# Patient Record
Sex: FEMALE | Race: WHITE
[De-identification: names, ages, dates, MRNs, and addresses within clinical notes are randomized per-mention and may not be internally consistent; named-entity substitution may affect disease eponyms.]

---

## 2017-12-05 ENCOUNTER — HOSPITAL ENCOUNTER (OUTPATIENT)
Dept: HOSPITAL 75 - RAD | Age: 82
End: 2017-12-05
Attending: NURSE PRACTITIONER
Payer: MEDICARE

## 2017-12-05 DIAGNOSIS — R10.11: Primary | ICD-10-CM

## 2017-12-05 PROCEDURE — 76705 ECHO EXAM OF ABDOMEN: CPT

## 2017-12-05 NOTE — DIAGNOSTIC IMAGING REPORT
PROCEDURE: 

US Gallbladder.



TECHNIQUE: 

Multiple Real-time grayscale images were obtained over the right

upper quadrant in various projections.



INDICATION:  

Right upper quadrant pain.



FINDINGS:

The visualized portions of the pancreas appear unremarkable. The

liver demonstrates a septated cyst measuring 2.3 x 2.4 x 1.7 cm

in the left lobe without solid or suspicious component. The

gallbladder demonstrates no stones or wall thickening. No

pericholecystic fluid. The sonographic Vigil sign is negative.

Hepatopetal flow in the portal vein is demonstrated. The CBD is 4

mm in caliber.



The right kidney is 8.9 cm in length with no hydronephrosis.

Slight prominence of the renal pelvis without dilatation of the

renal calyces is seen.



No significant fluid collection in the upper right abdomen.



IMPRESSION: 

No gallstones or evidence of cholecystitis.



Dictated by: 



  Dictated on workstation # [id number]

## 2018-11-19 ENCOUNTER — HOSPITAL ENCOUNTER (OUTPATIENT)
Dept: HOSPITAL 75 - RAD | Age: 83
End: 2018-11-19
Attending: INTERNAL MEDICINE
Payer: MEDICARE

## 2018-11-19 DIAGNOSIS — M47.816: Primary | ICD-10-CM

## 2018-11-19 DIAGNOSIS — G57.92: ICD-10-CM

## 2018-11-19 DIAGNOSIS — D18.09: ICD-10-CM

## 2018-11-19 DIAGNOSIS — Z86.69: ICD-10-CM

## 2018-11-19 PROCEDURE — 72148 MRI LUMBAR SPINE W/O DYE: CPT

## 2018-11-19 NOTE — DIAGNOSTIC IMAGING REPORT
PROCEDURE: MRI lumbar spine.



TECHNIQUE: Multiplanar, multisequence MRI of the lumbar spine was

performed without contrast.



INDICATION: Left leg pain.



COMPARISON: There are no prior studies available for comparison.



FINDINGS: The T2 sagittal images reveal that there is desiccation

of the discs at every level and there is narrowing of the disc

spaces at L3-L4 and L4-L5.



At the L3-L4 level, there is a slight disc bulge centrally. The

disc flattens the ventral aspect of the thecal sac and narrows

the AP diameter to 1.6 mm. There does not appear to be any

significant neuroforaminal narrowing at this level.



At the L4-L5 level, there is a disc bulge centrally which

flattens the ventral aspect of the thecal sac and narrows the AP

diameter to approximately 14.3 mm. There is mild narrowing of the

neuroforamen bilaterally at this level.



There is also mild narrowing of the neuroforamen bilaterally at

L5-S1 and there is slight anterior translation of L5 with respect

to S1. The AP diameter of the thecal sac at this level is 10.5

mm.



There is no evidence for spinal stenosis or any significant

neuroforaminal narrowing at L1-L2 or L2-L3.



There is no abnormal signal arising from the cord or other

vertebral bodies to indicate an acute abnormality. There do

appear to be multiple vertebral body hemangiomas involving the

T9, T10, T11, T12, and L3, L4, and S2.



There is no abnormal signal arising from the cord. There is an

8.6 x 13.5 mm perineural cyst on the left at T11. This is most

likely benign. There is a similar appearing but smaller 6.5 x 7.2

mm cyst on the right at this level. There also appear to be a few

benign-appearing sacral cysts.



There is no sign of a paraspinal mass.



IMPRESSION:

1. 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

primarily at L3-L4 and L4-L5. There is no evidence for spinal

stenosis or nerve root encroachment at these two levels or at any

other level of the lumbar spine.

2. There is no acute bony abnormality identified and there is no

sign of a cord lesion.

3. There are benign-appearing perineural cysts bilaterally at

T11. There also appear to be a few benign sacral cysts.

4. 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



Dictated by: 



  Dictated on workstation # [id number]

## 2020-01-24 ENCOUNTER — HOSPITAL ENCOUNTER (OUTPATIENT)
Dept: HOSPITAL 75 - RAD | Age: 85
End: 2020-01-24
Attending: INTERNAL MEDICINE
Payer: MEDICARE

## 2020-01-24 DIAGNOSIS — I65.23: Primary | ICD-10-CM

## 2020-01-24 PROCEDURE — 93880 EXTRACRANIAL BILAT STUDY: CPT

## 2020-01-24 NOTE — DIAGNOSTIC IMAGING REPORT
PROCEDURE: US carotid duplex, bilateral.



TECHNIQUE: Multiple real-time grayscale images were obtained over

the carotid arteries in various projections, bilaterally.

Additional spectral analysis and color Doppler duplex images were

also obtained.



INDICATION: Carotid stenosis.



FINDINGS: The previous carotid Doppler exam performed on

03/24/2016 noted atherosclerotic disease involving both carotid

systems but failed to show any sign of a hemodynamically

significant stenosis.



On this exam, there is mild hard and soft plaque formation in

both carotid systems. The flow velocities still fail to show any

sign of a hemodynamically significant stenosis of the common or

internal carotid arteries. Both vertebral arteries were

identified and there was antegrade flow bilaterally.



IMPRESSION: There is atherosclerotic disease involving both

carotid systems but there is still no evidence for a

hemodynamically significant stenosis of the common or internal

carotid arteries.



Parameters based on the consensus panel Gray-Scale and Doppler

ultrasound criteria published 

November 2003, Radiology, Volume 229. 



DOPPLER (peak systolic velocity M/S 

    

                     Right    Left



CCA               1.00     1.16



ICA Proximal   .90     .50



ICA Mid          .95     .62

 

ICA Distal       .97     .97



RATIO            .97     .98



ECA                .71     1.46



VERT              .59     .60



Dictated by: 



  Dictated on workstation # [id number]

## 2020-03-11 ENCOUNTER — HOSPITAL ENCOUNTER (OUTPATIENT)
Dept: HOSPITAL 75 - ER | Age: 85
Setting detail: OBSERVATION
LOS: 1 days | Discharge: HOME | End: 2020-03-12
Attending: INTERNAL MEDICINE | Admitting: FAMILY MEDICINE
Payer: MEDICARE

## 2020-03-11 VITALS — SYSTOLIC BLOOD PRESSURE: 142 MMHG | DIASTOLIC BLOOD PRESSURE: 61 MMHG

## 2020-03-11 VITALS — SYSTOLIC BLOOD PRESSURE: 133 MMHG | DIASTOLIC BLOOD PRESSURE: 95 MMHG

## 2020-03-11 VITALS — SYSTOLIC BLOOD PRESSURE: 160 MMHG | DIASTOLIC BLOOD PRESSURE: 59 MMHG

## 2020-03-11 VITALS — DIASTOLIC BLOOD PRESSURE: 78 MMHG | SYSTOLIC BLOOD PRESSURE: 154 MMHG

## 2020-03-11 VITALS — DIASTOLIC BLOOD PRESSURE: 69 MMHG | SYSTOLIC BLOOD PRESSURE: 133 MMHG

## 2020-03-11 VITALS — WEIGHT: 145.28 LBS | BODY MASS INDEX: 25.74 KG/M2 | HEIGHT: 62.99 IN

## 2020-03-11 VITALS — SYSTOLIC BLOOD PRESSURE: 136 MMHG | DIASTOLIC BLOOD PRESSURE: 81 MMHG

## 2020-03-11 VITALS — SYSTOLIC BLOOD PRESSURE: 123 MMHG | DIASTOLIC BLOOD PRESSURE: 40 MMHG

## 2020-03-11 VITALS — SYSTOLIC BLOOD PRESSURE: 154 MMHG | DIASTOLIC BLOOD PRESSURE: 70 MMHG

## 2020-03-11 DIAGNOSIS — Z90.710: ICD-10-CM

## 2020-03-11 DIAGNOSIS — Z90.89: ICD-10-CM

## 2020-03-11 DIAGNOSIS — Z91.012: ICD-10-CM

## 2020-03-11 DIAGNOSIS — Z88.5: ICD-10-CM

## 2020-03-11 DIAGNOSIS — Z91.040: ICD-10-CM

## 2020-03-11 DIAGNOSIS — Z91.011: ICD-10-CM

## 2020-03-11 DIAGNOSIS — Z82.61: ICD-10-CM

## 2020-03-11 DIAGNOSIS — I44.7: Primary | ICD-10-CM

## 2020-03-11 DIAGNOSIS — I11.9: ICD-10-CM

## 2020-03-11 DIAGNOSIS — Z79.82: ICD-10-CM

## 2020-03-11 DIAGNOSIS — Z88.8: ICD-10-CM

## 2020-03-11 DIAGNOSIS — Z79.899: ICD-10-CM

## 2020-03-11 DIAGNOSIS — E03.9: ICD-10-CM

## 2020-03-11 DIAGNOSIS — K59.09: ICD-10-CM

## 2020-03-11 DIAGNOSIS — Z82.62: ICD-10-CM

## 2020-03-11 DIAGNOSIS — Z91.041: ICD-10-CM

## 2020-03-11 LAB
ALBUMIN SERPL-MCNC: 4.1 GM/DL (ref 3.2–4.5)
ALP SERPL-CCNC: 102 U/L (ref 40–136)
ALT SERPL-CCNC: 21 U/L (ref 0–55)
APTT BLD: 27 SEC (ref 24–35)
BASOPHILS # BLD AUTO: 0 10^3/UL (ref 0–0.1)
BASOPHILS NFR BLD AUTO: 1 % (ref 0–10)
BILIRUB SERPL-MCNC: 0.5 MG/DL (ref 0.1–1)
BUN/CREAT SERPL: 19
CALCIUM SERPL-MCNC: 9.3 MG/DL (ref 8.5–10.1)
CHLORIDE SERPL-SCNC: 103 MMOL/L (ref 98–107)
CK MB SERPL-MCNC: 1.1 NG/ML (ref ?–6.6)
CK SERPL-CCNC: 43 U/L (ref 29–168)
CO2 SERPL-SCNC: 23 MMOL/L (ref 21–32)
CREAT SERPL-MCNC: 0.74 MG/DL (ref 0.6–1.3)
EOSINOPHIL # BLD AUTO: 0.2 10^3/UL (ref 0–0.3)
EOSINOPHIL NFR BLD AUTO: 3 % (ref 0–10)
ERYTHROCYTE [DISTWIDTH] IN BLOOD BY AUTOMATED COUNT: 13.8 % (ref 10–14.5)
GFR SERPLBLD BASED ON 1.73 SQ M-ARVRAT: > 60 ML/MIN
GLUCOSE SERPL-MCNC: 93 MG/DL (ref 70–105)
HCT VFR BLD CALC: 42 % (ref 35–52)
HGB BLD-MCNC: 13.9 G/DL (ref 11.5–16)
INR PPP: 0.9 (ref 0.8–1.4)
LYMPHOCYTES # BLD AUTO: 1.8 X 10^3 (ref 1–4)
LYMPHOCYTES NFR BLD AUTO: 30 % (ref 12–44)
MAGNESIUM SERPL-MCNC: 2.2 MG/DL (ref 1.6–2.4)
MANUAL DIFFERENTIAL PERFORMED BLD QL: NO
MCH RBC QN AUTO: 31 PG (ref 25–34)
MCHC RBC AUTO-ENTMCNC: 33 G/DL (ref 32–36)
MCV RBC AUTO: 92 FL (ref 80–99)
MONOCYTES # BLD AUTO: 0.7 X 10^3 (ref 0–1)
MONOCYTES NFR BLD AUTO: 13 % (ref 0–12)
NEUTROPHILS # BLD AUTO: 3.2 X 10^3 (ref 1.8–7.8)
NEUTROPHILS NFR BLD AUTO: 55 % (ref 42–75)
PLATELET # BLD: 210 10^3/UL (ref 130–400)
PMV BLD AUTO: 10.9 FL (ref 7.4–10.4)
POTASSIUM SERPL-SCNC: 4.5 MMOL/L (ref 3.6–5)
PROT SERPL-MCNC: 7.4 GM/DL (ref 6.4–8.2)
PROTHROMBIN TIME: 12.4 SEC (ref 12.2–14.7)
SODIUM SERPL-SCNC: 139 MMOL/L (ref 135–145)
TSH SERPL DL<=0.05 MIU/L-ACNC: 1.78 UIU/ML (ref 0.35–4.94)
WBC # BLD AUTO: 5.9 10^3/UL (ref 4.3–11)

## 2020-03-11 PROCEDURE — 93005 ELECTROCARDIOGRAM TRACING: CPT

## 2020-03-11 PROCEDURE — 84484 ASSAY OF TROPONIN QUANT: CPT

## 2020-03-11 PROCEDURE — 82550 ASSAY OF CK (CPK): CPT

## 2020-03-11 PROCEDURE — 83735 ASSAY OF MAGNESIUM: CPT

## 2020-03-11 PROCEDURE — 83874 ASSAY OF MYOGLOBIN: CPT

## 2020-03-11 PROCEDURE — 85730 THROMBOPLASTIN TIME PARTIAL: CPT

## 2020-03-11 PROCEDURE — 36415 COLL VENOUS BLD VENIPUNCTURE: CPT

## 2020-03-11 PROCEDURE — 84443 ASSAY THYROID STIM HORMONE: CPT

## 2020-03-11 PROCEDURE — 80053 COMPREHEN METABOLIC PANEL: CPT

## 2020-03-11 PROCEDURE — 85610 PROTHROMBIN TIME: CPT

## 2020-03-11 PROCEDURE — 71045 X-RAY EXAM CHEST 1 VIEW: CPT

## 2020-03-11 PROCEDURE — 83880 ASSAY OF NATRIURETIC PEPTIDE: CPT

## 2020-03-11 PROCEDURE — 85025 COMPLETE CBC W/AUTO DIFF WBC: CPT

## 2020-03-11 PROCEDURE — 82553 CREATINE MB FRACTION: CPT

## 2020-03-11 PROCEDURE — 93041 RHYTHM ECG TRACING: CPT

## 2020-03-11 RX ADMIN — Medication SCH ML: at 22:26

## 2020-03-11 NOTE — ED CARDIAC GENERAL
History of Present Illness


General


Chief Complaint:  Cardiac/General Problems


Stated Complaint:  ABNORMAL EKG


Nursing Triage Note:  


Pt to room #6 via ED w/c by family with c/o EKG changes. Pt reports pta, she was




seen at PCP where an EKG was obtained, showing changes not seen on previous EKG.




Pt reports she was sent to this ED for further evaluation by Dr. Hutchison. Pt 


reports weakness, palpitation, et SOA. Pt denies CP, fever, or chills.


Source:  patient





History of Present Illness


Date Seen by Provider:  Mar 11, 2020


Time Seen by Provider:  17:00


Initial Comments


PT ARRIVES VIA POV WITH  AND DAUGHTER--SENT HERE FROM DR. HUTCHISON'S 

OFFICE


STATES SHE WAS TOLD HER "LEFT BUNDLE BRANCH BLOCK IS GETTING WORSE" 


PT STATES SHE WAS DX WITH LBBB IN THE LAST YEAR, BUT HAS NOT SEEN A CARDIOLOGIST

IN SEVERAL YEARS


HAD CARDIAC CATH BY DR. LUQUE AT [facility] YEARS AGO--WAS TOLD ARTERIES 

WERE "CLEAR" BUT SHE HAS PROBLEMS WITH HER VALVES--"MITRAL VALVE PROLAPSE" PER 

PT AND FAMILY. 





PT DENIES CHEST PAIN 


STATES SHE HAS BEEN HAVING SHORTNESS OF BREATH FOR THE LAST FEW MONTHS


STATES SHE HAS BEEN HAVING LEG SWELLING FOR ABOUT 6 MONTHS, ALONG WITH SWELLING 

OF HER ABDOMEN


STATES SHE HAS ONGOING INTERMITTENT PALPITATIONS--STATES SHE HAD AN EPISODE LAST

NIGHT THAT WOKE HER UP--HEART WAS BEATING FAST AND VERY IRREGULAR AND THEN IT 

WOULD STOP BEATING FOR A SECOND, THEN START BEATING AGAIN. WAS NAUSEATED WITH IT

AS WELL


NO SWEATS


HAS HAD A COUPLE OF EPISODES OF DIZZINESS ON STANDING AND WITH EXERTION OVER THE

LAST COUPLE OF WEEKS





Allergies and Home Medications


Allergies


Coded Allergies:  


     egg (Unverified  Allergy, Unknown, 4/18/14)


   


   FROM UNCODED ALLERGIES


     latex (Verified  Allergy, Unknown, 4/18/14)


     milk (Unverified  Allergy, Unknown, 4/18/14)


   


   FROM UNCODED ALLERGIES


Uncoded Allergies:  


     ANTIBIOTICS ALL (Allergy, Unknown, 4/18/14)


     MIRANDA (Allergy, Unknown, 4/18/14)


     CODEINE (Allergy, Unknown, 4/18/14)


     CORTISONE (Allergy, Unknown, 4/18/14)


     DYE (Allergy, Unknown, 4/18/14)





Home Medications


Ascorbic Acid 500 Mg Tablet, 500 MG PO DAILY WITH LUNCH, (Reported)


Aspirin 81 Mg Tabec, 81 MG PO BID, (Reported)


Azithromycin 250 Mg Tab, 1 TAB PO DAILY


   Prescribed by: AYDE SUERO on 4/19/14 1243


Cholecalciferol 5,000 Unit Tablet, 5,000 UNIT PO DAILY, (Reported)


Cranberry Fruit 450 Mg Tablet, 450 MG PO BID, (Reported)


Guaifenesin 600 Mg Tab, 600 MG PO BID


   Prescribed by: AYDE SUERO on 4/19/14 1243


Thyroid,Pork 60 Mg Tablet, 60 MG PO DAILY, (Reported)


Vitamin B Complex 1 Cap Capsule, 1 CAP PO DAILY WITH LUNCH, (Reported)


[Best Benfotiamine]  , 300 MG PO BID, (Reported)


[Fish Oil Liquid]  , 2 TSP PO DAILY WITH LUNCH, (Reported)


[Plant Enzymes]  , 1-2 CAP PO TID, (Reported)


   TAKES 1 TO 2 CAPSULES WITH EACH MEAL DEPENDING ON SIZE OF MEAL 





Patient Home Medication List


Home Medication List Reviewed:  Yes





Review of Systems


Review of Systems


Constitutional:  see HPI, dizziness


EENTM:  No Symptoms Reported


Respiratory:  See HPI, Shortness of Air, SOA With Exertion


Cardiovascular:  See HPI, Edema, Irregular Heart Rate, Lightheadedness, 

Palpitations; Denies Syncope


Gastrointestinal:  See HPI; Denies Abdominal Pain; Nausea; Denies Vomiting


Genitourinary:  No Symptoms Reported


Musculoskeletal:  no symptoms reported


Skin:  no symptoms reported


Psychiatric/Neurological:  No Symptoms Reported


Endocrine:  No Symptoms Reported


Hematologic/Lymphatic:  No Symptoms Reported





Past Medical-Social-Family Hx


Past Med/Social Hx:  Reviewed and Corrections made


Patient Social History


Alcohol Use:  Denies Use


Recreational Drug Use:  No


Smoking Status:  Never a Smoker


Recent Foreign Travel:  No


Contact w/Someone Who Travel:  No


Recent Infectious Disease Expo:  No





Immunizations Up To Date


Tetanus Booster (TDap):  Unknown





Past Medical History


Surgeries:  Yes (HYST/OVARIES INTACT; APPENDECTOMY; T&A; LEFT ANKLE FX/ORIF; 

CARDIAC CATH)


Adenoidectomy, Cardiac, Hysterectomy, Orthopedic, Tonsillectomy


Respiratory:  Yes


Pneumonia


Cardiac:  Yes ("MITRAL VALVE PROLAPSE" PER PT AND FAMILY; CARDIAC CATH--NO 

INTERVENTION)


Valvular Heart Disease


Neurological:  No


Reproductive Disorders:  No


GYN History:  Hysterectomy, Menopausal


Genitourinary:  No


Gastrointestinal:  Yes


Chronic Constipation


Musculoskeletal:  Yes (LEFT ANKLE FX/ORIF)


Fractures


Endocrine:  Yes


Hypothyroidsim


HEENT:  Yes


Tonsilitis


Cancer:  No


Psychosocial:  No


Integumentary:  No


Blood Disorders:  No





Family Medical History





Family history: Allergy


  09 BROTHER


Family history: Arthritis


  03 MOTHER


Family history: Cardiovascular disease


  03 FATHER


Family history: Hypertension


  03 MOTHER


Family history: Osteoporosis


  03 MOTHER


Myocardial infarction


  03 FATHER


Visual impairment


  03 FATHER (MAC DENGERATION)





Physical Exam


Vital Signs





Vital Signs - First Documented








 3/11/20





 16:53


 


Temp 36.8


 


Pulse 76


 


Resp 19


 


B/P (MAP) 148/78 (101)


 


Pulse Ox 98


 


O2 Delivery Room Air





Capillary Refill : Less Than 3 Seconds


Height, Weight, BMI


Height: 5'1.00"


Weight: 118lbs. 4.0oz. 53.932691aw; 25.00 BMI


Method:Stated


General Appearance:  No Apparent Distress, WD/WN


HEENT:  PERRL/EOMI


Neck:  Full Range of Motion, Normal Inspection, Non Tender, Supple; No Carotid 

Bruit, No JVD


Respiratory:  Normal Breath Sounds, No Accessory Muscle Use, No Respiratory 

Distress


Cardiovascular:  Regular Rate, Rhythm, No Edema, No JVD, Normal Peripheral 

Pulses, Systolic Murmur (? FAINT ? ); No JVD; Other (NO HEPATOJUGULAR REFLEX)


Gastrointestinal:  Soft


Extremity:  Normal Capillary Refill, Normal Inspection, Normal Range of Motion, 

Non Tender, No Calf Tenderness, No Pedal Edema (WEARING KNEE HIGH COMPRESSION 

STOCKINGS)


Neurologic/Psychiatric:  Alert, Oriented x3, No Motor/Sensory Deficits, Normal 

Mood/Affect, CNs II-XII Norm as Tested


Skin:  Normal Color, Warm/Dry





Progress/Results/Core Measures


Results/Orders


Lab Results





Laboratory Tests








Test


 3/11/20


17:00 Range/Units


 


 


White Blood Count


 5.9 


 4.3-11.0


10^3/uL


 


Red Blood Count


 4.54 


 4.35-5.85


10^6/uL


 


Hemoglobin 13.9  11.5-16.0  G/DL


 


Hematocrit 42  35-52  %


 


Mean Corpuscular Volume 92  80-99  FL


 


Mean Corpuscular Hemoglobin 31  25-34  PG


 


Mean Corpuscular Hemoglobin


Concent 33 


 32-36  G/DL





 


Red Cell Distribution Width 13.8  10.0-14.5  %


 


Platelet Count


 210 


 130-400


10^3/uL


 


Mean Platelet Volume 10.9 H 7.4-10.4  FL


 


Neutrophils (%) (Auto) 55  42-75  %


 


Lymphocytes (%) (Auto) 30  12-44  %


 


Monocytes (%) (Auto) 13 H 0-12  %


 


Eosinophils (%) (Auto) 3  0-10  %


 


Basophils (%) (Auto) 1  0-10  %


 


Neutrophils # (Auto) 3.2  1.8-7.8  X 10^3


 


Lymphocytes # (Auto) 1.8  1.0-4.0  X 10^3


 


Monocytes # (Auto) 0.7  0.0-1.0  X 10^3


 


Eosinophils # (Auto)


 0.2 


 0.0-0.3


10^3/uL


 


Basophils # (Auto)


 0.0 


 0.0-0.1


10^3/uL


 


Prothrombin Time 12.4  12.2-14.7  SEC


 


INR Comment 0.9  0.8-1.4  


 


Activated Partial


Thromboplast Time 27 


 24-35  SEC





 


Sodium Level 139  135-145  MMOL/L


 


Potassium Level 4.5  3.6-5.0  MMOL/L


 


Chloride Level 103    MMOL/L


 


Carbon Dioxide Level 23  21-32  MMOL/L


 


Anion Gap 13  5-14  MMOL/L


 


Blood Urea Nitrogen 14  7-18  MG/DL


 


Creatinine


 0.74 


 0.60-1.30


MG/DL


 


Estimat Glomerular Filtration


Rate > 60 


  





 


BUN/Creatinine Ratio 19   


 


Glucose Level 93    MG/DL


 


Calcium Level 9.3  8.5-10.1  MG/DL


 


Corrected Calcium 9.2  8.5-10.1  MG/DL


 


Magnesium Level 2.2  1.6-2.4  MG/DL


 


Total Bilirubin 0.5  0.1-1.0  MG/DL


 


Aspartate Amino Transf


(AST/SGOT) 36 H


 5-34  U/L





 


Alanine Aminotransferase


(ALT/SGPT) 21 


 0-55  U/L





 


Alkaline Phosphatase 102    U/L


 


Total Creatine Kinase 43    U/L


 


Creatine Kinase MB 1.1  <6.6  NG/ML


 


Myoglobin


 53.3 


 10.0-92.0


NG/ML


 


Troponin I < 0.028  <0.028  NG/ML


 


B-Type Natriuretic Peptide 91.6  <100.0  PG/ML


 


Total Protein 7.4  6.4-8.2  GM/DL


 


Albumin 4.1  3.2-4.5  GM/DL


 


TSH Cascade Testing


 1.78 


 0.35-4.94


UIU/ML








My Orders





Orders - TERA,LIAT K DO


Ed Iv/Invasive Line Start (3/11/20 17:15)


Ekg Tracing (3/11/20 17:15)


Monitor-Rhythm Ecg Trace Only (3/11/20 17:15)


BNP (3/11/20 17:15)


Cbc With Automated Diff (3/11/20 17:15)


Comprehensive Metabolic Panel (3/11/20 17:15)


Creatine Kinase (3/11/20 17:15)


Creatine Kinase Mb (3/11/20 17:15)


Magnesium (3/11/20 17:15)


Protime With Inr (3/11/20 17:15)


Partial Thromboplastin Time (3/11/20 17:15)


Thyroid Analyzer (3/11/20 17:15)


Myoglobin Serum (3/11/20 17:15)


Troponin I (3/11/20 17:15)


Chest 1 View, Ap/Pa Only (3/11/20 17:15)


Aspirin Chewable Tablet (Baby Aspirin Ch (3/11/20 17:15)





Medications Given in ED





Current Medications








 Medications  Dose


 Ordered  Sig/Ismael


 Route  Start Time


 Stop Time Status Last Admin


Dose Admin


 


 Aspirin  324 mg  ONCE  ONCE


 PO  3/11/20 17:15


 3/11/20 17:17 DC 3/11/20 17:26


324 MG








Vital Signs/I&O











 3/11/20





 16:53


 


Temp 36.8


 


Pulse 76


 


Resp 19


 


B/P (MAP) 148/78 (101)


 


Pulse Ox 98


 


O2 Delivery Room Air














Blood Pressure Mean:                    101











Progress


Progress Note :  


Progress Note


UNEVENTFUL ER STAY


COMPLETELY ASYMPTOMATIC FOR ENTIRE ER STAY


NO ARRHYTHMIAS DURING ER STAY


Initial ECG Impression Date:  Mar 11, 2020


Initial ECG Impression Time:  17:03


Initial ECG Rate:  83


Initial ECG Rhythm:  Normal Sinus (LBBB)


Initial ECG Comparisson:  Changed (FROM 2015--LBBB WAS NOT PRESENT AT THAT 

TIME.)





Diagnostic Imaging





Comments


CXR--NO ACUTE PROCESS, PER RADIOLOGIST REPORT AT 1750


   Reviewed:  Reviewed by Me





Departure


Communication (Admissions)


1803--SPOKE WITH DR. VAUGHN CARDIOLOGIST, WILL SEE PT IN CONSULT. WILL GET 

ECHOCARDIOGRAM IN AM


1807--SPOKE WITH DR. PATINO, HOSPITALIST, ACCEPTS PT FOR ADMIT





Impression





   Primary Impression:  


   Dyspnea


   Additional Impressions:  


   Palpitations


   LBBB (left bundle branch block)


   Edema


Disposition:  09 ADMITTED AS INPATIENT


Condition:  Stable





Admissions


Decision to Admit Reason:  Admit from ER (General)


Decision to Admit/Date:  Mar 11, 2020


Time/Decision to Admit Time:  18:05





Departure-Patient Inst.


Referrals:  


SARWAT HUTCHISON DO (PCP)


Primary Care Physician








NIKI HUTCHISON DNP (Family)


Primary Care Physician











LIAT HALEY DO                 Mar 11, 2020 17:35

## 2020-03-11 NOTE — NUR
RAN BANSAL admitted to room , with an admitting diagnosis of 
Palpatations,dyspnea,pedal edema,lbbb, on 03/11/20 from ED [facility], accompanied by 
.RAN BANSAL introduced to surroundings, call light, bed controls, phone, TV, 
temperature control, lights, meal times, smoking policy, visitor policy, side rail policy, 
bathrooms and showers.  Patient Rights given to patient in the handbook. RAN BANSAL 
verbalizes understanding that [facility] is not responsible for the loss or damage to any 
personal effects or valuables that are kept in the patients possession during their 
hospitalization.  The following Patient Care Plans were discussed with the patient and 
: Discharge Planning, pain,activity, and diet. RAN BANSAL verbalizes 
understanding of Interdisciplinary Patient Education. Patient and/or family were informed 
about the Rapid Response Team and its purpose.

## 2020-03-11 NOTE — DIAGNOSTIC IMAGING REPORT
INDICATION: EKG changes. Palpitations and shortness of air.



COMPARISON STUDY: Chest from 11/03/2015.



FINDINGS: Portable view of the chest demonstrates mild

calcification of the aorta. The heart size is upper normal with

normal vascularity. Lungs are clear. There are no pleural

effusions.



IMPRESSION: The heart size is normal with mild calcification of

the aortic arch.



Dictated by: 



  Dictated on workstation # [id number]

## 2020-03-12 VITALS — DIASTOLIC BLOOD PRESSURE: 71 MMHG | SYSTOLIC BLOOD PRESSURE: 146 MMHG

## 2020-03-12 VITALS — SYSTOLIC BLOOD PRESSURE: 118 MMHG | DIASTOLIC BLOOD PRESSURE: 44 MMHG

## 2020-03-12 VITALS — SYSTOLIC BLOOD PRESSURE: 143 MMHG | DIASTOLIC BLOOD PRESSURE: 76 MMHG

## 2020-03-12 LAB
ALBUMIN SERPL-MCNC: 3.5 GM/DL (ref 3.2–4.5)
ALP SERPL-CCNC: 84 U/L (ref 40–136)
ALT SERPL-CCNC: 15 U/L (ref 0–55)
BASOPHILS # BLD AUTO: 0 10^3/UL (ref 0–0.1)
BASOPHILS NFR BLD AUTO: 1 % (ref 0–10)
BILIRUB SERPL-MCNC: 0.6 MG/DL (ref 0.1–1)
BUN/CREAT SERPL: 13
CALCIUM SERPL-MCNC: 8.7 MG/DL (ref 8.5–10.1)
CHLORIDE SERPL-SCNC: 107 MMOL/L (ref 98–107)
CO2 SERPL-SCNC: 24 MMOL/L (ref 21–32)
CREAT SERPL-MCNC: 0.75 MG/DL (ref 0.6–1.3)
EOSINOPHIL # BLD AUTO: 0.3 10^3/UL (ref 0–0.3)
EOSINOPHIL NFR BLD AUTO: 4 % (ref 0–10)
ERYTHROCYTE [DISTWIDTH] IN BLOOD BY AUTOMATED COUNT: 13.6 % (ref 10–14.5)
GFR SERPLBLD BASED ON 1.73 SQ M-ARVRAT: > 60 ML/MIN
GLUCOSE SERPL-MCNC: 92 MG/DL (ref 70–105)
HCT VFR BLD CALC: 38 % (ref 35–52)
HGB BLD-MCNC: 12.4 G/DL (ref 11.5–16)
LYMPHOCYTES # BLD AUTO: 1.6 X 10^3 (ref 1–4)
LYMPHOCYTES NFR BLD AUTO: 26 % (ref 12–44)
MANUAL DIFFERENTIAL PERFORMED BLD QL: NO
MCH RBC QN AUTO: 30 PG (ref 25–34)
MCHC RBC AUTO-ENTMCNC: 33 G/DL (ref 32–36)
MCV RBC AUTO: 92 FL (ref 80–99)
MONOCYTES # BLD AUTO: 0.8 X 10^3 (ref 0–1)
MONOCYTES NFR BLD AUTO: 13 % (ref 0–12)
NEUTROPHILS # BLD AUTO: 3.4 X 10^3 (ref 1.8–7.8)
NEUTROPHILS NFR BLD AUTO: 57 % (ref 42–75)
PLATELET # BLD: 203 10^3/UL (ref 130–400)
PMV BLD AUTO: 11 FL (ref 7.4–10.4)
POTASSIUM SERPL-SCNC: 3.7 MMOL/L (ref 3.6–5)
PROT SERPL-MCNC: 6.1 GM/DL (ref 6.4–8.2)
SODIUM SERPL-SCNC: 141 MMOL/L (ref 135–145)
WBC # BLD AUTO: 6.1 10^3/UL (ref 4.3–11)

## 2020-03-12 RX ADMIN — Medication SCH ML: at 05:35

## 2020-03-12 NOTE — SHORT STAY SUMMARY
History of Present Illness


History of Present Illness


Reason for visit/HPI


84-year-old lady with history of hypothyroidism, reporting mild dyspnea on 

exertion which has been worsening recently.  Mild pedal edema on and off.  

Reporting that she has left bundle branch block at least for the past 4 years.  

She denied any chest pain, syncopal episodes, currently asymptomatic, she was 

monitored overnight in the hospital and no arrhythmia was detected.  EKG showing

left bundle branch block


Date of Admission


Mar 11, 2020 at 18:05


Date of Discharge


2020


Time Seen by Provider:  07:54


Attending Physician


Payam Hutchison DO


Admitting Physician


Payam Hutchison DO


Consult








Allergies and Home Medications


Allergies


Coded Allergies:  


     egg (Unverified  Allergy, Unknown, 14)


   


   FROM UNCODED ALLERGIES


     latex (Verified  Allergy, Unknown, 14)


     milk (Unverified  Allergy, Unknown, 14)


   


   FROM UNCODED ALLERGIES


Uncoded Allergies:  


     ANTIBIOTICS ALL (Allergy, Unknown, 14)


     MIRANDA (Allergy, Unknown, 14)


     CODEINE (Allergy, Unknown, 14)


     CORTISONE (Allergy, Unknown, 14)


     DYE (Allergy, Unknown, 14)





Home Medications


Ascorbic Acid 500 Mg Tablet, 500 MG PO DAILY WITH LUNCH, (Reported)


Aspirin 81 Mg Tabec, 81 MG PO BID, (Reported)


Azithromycin 250 Mg Tab, 1 TAB PO DAILY


   Prescribed by: AYDE SUERO on 14 1243


Cholecalciferol 5,000 Unit Tablet, 5,000 UNIT PO DAILY, (Reported)


Cranberry Fruit 450 Mg Tablet, 450 MG PO BID, (Reported)


Guaifenesin 600 Mg Tab, 600 MG PO BID


   Prescribed by: AYDE SUERO on 14 1243


Thyroid,Pork 60 Mg Tablet, 60 MG PO DAILY, (Reported)


Vitamin B Complex 1 Cap Capsule, 1 CAP PO DAILY WITH LUNCH, (Reported)


[Best Benfotiamine]  , 300 MG PO BID, (Reported)


[Fish Oil Liquid]  , 2 TSP PO DAILY WITH LUNCH, (Reported)


[Plant Enzymes]  , 1-2 CAP PO TID, (Reported)


   TAKES 1 TO 2 CAPSULES WITH EACH MEAL DEPENDING ON SIZE OF MEAL 





Patient Home Medication List


Home Medication List Reviewed:  Yes





Past Medical-Social-Family Hx


Patient Social History


Marrital Status:  


Employed/Student:  retired


Alcohol Use:  Denies Use


Recreational Drug Use:  No


Smoking Status:  Never a Smoker


2nd Hand Smoke Exposure:  No


Recent Foreign Travel:  No


Contact w/other who traveled:  No


Recent Infectious Disease Expo:  No





Immunizations Up To Date


Tetanus Booster (TDap):  Unknown





Surgeries


Yes (HYST/OVARIES INTACT; APPENDECTOMY; T&A; LEFT ANKLE FX/ORIF; CARDIAC CATH)


Adenoidectomy, Cardiac, Hysterectomy, Orthopedic, Tonsillectomy





Respiratory


Yes





Cardiovascular


Yes ("MITRAL VALVE PROLAPSE" PER PT AND FAMILY; CARDIAC CATH--NO INTERVENTION)


Valvular Heart Disease





Neurological


No





Reproductive System


Pregnant:  No


Hx Reproductive Disorders:  No


GYN History:  Hysterectomy, Menopausal





Genitourinary


No





Gastrointestinal


Yes


Chronic Constipation





Musculoskeletal


Yes (LEFT ANKLE FX/ORIF)


Fractures





Endocrine


History of Endocrine Disorders:  Yes


Endocrine Disorders:  Hypothyroidsim





HEENT


History of HEENT Disorders:  Yes


HEENT Disorders:  Tonsilitis





Cancer


No





Psychosocial


History of Psychiatric Problem:  No





Integumentary


History of Skin or Integumenta:  No





Blood Transfusions


History of Blood Disorders:  No





Family Medical History


Family Hx:  


Family history: Allergy


  09 BROTHER


Family history: Arthritis


  03 MOTHER


Family history: Cardiovascular disease


  03 FATHER


Family history: Hypertension


  03 MOTHER


Family history: Osteoporosis


  03 MOTHER


Myocardial infarction


  03 FATHER


Visual impairment


  03 FATHER (MAC DENGERATION)





Review of Systems


Constitutional:  see HPI


EENTM:  see HPI


Respiratory:  see HPI; No cough; dyspnea on exertion; No hemoptysis, No 

orthopnea, No phlegm, No short of breath, No stridor, No wheezing, No other


Cardiovascular:  see HPI; No chest pain, No edema, No Hx of Intervention; 

palpitations; No syncope, No vascular heart diseas, No other


Gastrointestinal:  see HPI


Genitourinary:  see HPI


Musculoskeletal:  see HPI


Skin:  see HPI


Psychiatric/Neurological:  No Symptoms Reported, See HPI





Physical Exam


Vital Signs





Vital Signs - First Documented








 3/11/20





 16:53


 


Temp 36.8


 


Pulse 76


 


Resp 19


 


B/P (MAP) 148/78 (101)


 


Pulse Ox 98


 


O2 Delivery Room Air





Capillary Refill : Less Than 3 Seconds


Height, Weight, BMI


Height: 5'1.00"


Weight: 118lbs. 4.0oz. 53.009831qa; 25.74 BMI


Method:Stated


General Appearance:  No Apparent Distress, WD/WN


Eyes:  Bilateral Eye Normal Inspection, Bilateral Eye PERRL, Bilateral Eye EOMI


HEENT:  PERRL/EOMI, TMs Normal, Normal ENT Inspection, Pharynx Normal


Neck:  Full Range of Motion, Normal Inspection, Non Tender, Supple, Carotid 

Bruit


Respiratory:  Chest Non Tender, Lungs Clear, Normal Breath Sounds, No Accessory 

Muscle Use, No Respiratory Distress


Cardiovascular:  Regular Rate, Rhythm, No Edema, No Gallop, No JVD, No Murmur, 

Normal Peripheral Pulses


Gastrointestinal:  Normal Bowel Sounds, No Organomegaly, No Pulsatile Mass, Non 

Tender, Soft


Back:  Normal Inspection, No CVA Tenderness, No Vertebral Tenderness


Extremity:  Normal Capillary Refill, Normal Inspection, Normal Range of Motion, 

Non Tender, No Calf Tenderness, No Pedal Edema


Neurologic/Psychiatric:  Alert, Oriented x3, No Motor/Sensory Deficits, Normal 

Mood/Affect


Skin:  Normal Color, Warm/Dry


Lymphatic:  No Adenopathy





Clinical Quality Measures


Admission Status


Admission Status:  Observation





DVT/VTE Risk/Contraindication:


Risk Factor Score Per Nursin


RFS Level Per Nursing on Admit:  2=Moderate





Short Stay Diagnosis


Discharge Diagnosis-Short Stay


Admission Diagnosis:  


Shortness of breath


Palpitation


Left bundle branch block


Hypothyroidism


Final Discharge Diagnosis:  


Shortness of breath


Palpitation


Left bundle branch block


Hypothyroidism





Conclusion


Labs


Laboratory Tests


3/11/20 17:00: 


White Blood Count 5.9, Red Blood Count 4.54, Hemoglobin 13.9, Hematocrit 42, 

Mean Corpuscular Volume 92, Mean Corpuscular Hemoglobin 31, Mean Corpuscular 

Hemoglobin Concent 33, Red Cell Distribution Width 13.8, Platelet Count 210, 

Mean Platelet Volume 10.9H, Neutrophils (%) (Auto) 55, Lymphocytes (%) (Auto) 

30, Monocytes (%) (Auto) 13H, Eosinophils (%) (Auto) 3, Basophils (%) (Auto) 1, 

Neutrophils # (Auto) 3.2, Lymphocytes # (Auto) 1.8, Monocytes # (Auto) 0.7, 

Eosinophils # (Auto) 0.2, Basophils # (Auto) 0.0, Prothrombin Time 12.4, INR 

Comment 0.9, Activated Partial Thromboplast Time 27, Sodium Level 139, Potassium

Level 4.5, Chloride Level 103, Carbon Dioxide Level 23, Anion Gap 13, Blood Urea

Nitrogen 14, Creatinine 0.74, Estimat Glomerular Filtration Rate > 60, 

BUN/Creatinine Ratio 19, Glucose Level 93, Calcium Level 9.3, Corrected Calcium 

9.2, Magnesium Level 2.2, Total Bilirubin 0.5, Aspartate Amino Transf (AST/SGOT)

36H, Alanine Aminotransferase (ALT/SGPT) 21, Alkaline Phosphatase 102, Total 

Creatine Kinase 43, Creatine Kinase MB 1.1, Myoglobin 53.3, Troponin I < 0.028, 

B-Type Natriuretic Peptide 91.6, Total Protein 7.4, Albumin 4.1, TSH Cascade 

Testing 1.78


3/11/20 22:58: Troponin I < 0.028


3/12/20 03:04: 


White Blood Count 6.1, Red Blood Count 4.08L, Hemoglobin 12.4, Hematocrit 38, 

Mean Corpuscular Volume 92, Mean Corpuscular Hemoglobin 30, Mean Corpuscular 

Hemoglobin Concent 33, Red Cell Distribution Width 13.6, Platelet Count 203, 

Mean Platelet Volume 11.0H, Neutrophils (%) (Auto) 57, Lymphocytes (%) (Auto) 

26, Monocytes (%) (Auto) 13H, Eosinophils (%) (Auto) 4, Basophils (%) (Auto) 1, 

Neutrophils # (Auto) 3.4, Lymphocytes # (Auto) 1.6, Monocytes # (Auto) 0.8, 

Eosinophils # (Auto) 0.3, Basophils # (Auto) 0.0, Sodium Level 141, Potassium 

Level 3.7, Chloride Level 107, Carbon Dioxide Level 24, Anion Gap 10, Blood Urea

Nitrogen 10, Creatinine 0.75, Estimat Glomerular Filtration Rate > 60, 

BUN/Creatinine Ratio 13, Glucose Level 92, Calcium Level 8.7, Corrected Calcium 

9.1, Total Bilirubin 0.6, Aspartate Amino Transf (AST/SGOT) 23, Alanine 

Aminotransferase (ALT/SGPT) 15, Alkaline Phosphatase 84, Total Protein 6.1L, 

Albumin 3.5


Conclusion/Plan


Shortness of breath, chronic, slight deterioration recently, workup has been 

negative including electrolytes, cardiac enzymes, BNP, chest x-ray.  Planning to

evaluate echo and stress test which can be done as an outpatient





Palpitation, no further episodes were reported, monitored on telemetry and no 

arrhythmia was addicted, if she continued to have palpitation would consider 

Holter monitor next





Left bundle branch block, reporting that she had an EKG in [facility] 

about 4 years ago and she was told that she has left bundle branch block, 

asymptomatic otherwise, planning to evaluate echo and stress test which can be 

done as an outpatient





Hypothyroidism, managed by primary care physician











AUBRIE VAUGHN MD              Mar 12, 2020 07:56

## 2020-03-12 NOTE — NUR
RAN BANSAL demonstrates understanding of discharge instructions and accurately returns 
instructions upon questioning.  Copy of Post-Discharge Instructions and Medication Discharge 
Instructions given to pt. RAN BANSAL is able to manage continuing needs after discharge. 
 Patients belongings returned to pt. Skin dry and intact; no breakdown noted. Patient 
discharged from [facility]- on 3/12/20 at 0850. RAN BANSAL left floor via wc, accompanied by 
staff/family.

## 2020-03-13 NOTE — XMS REPORT
Encounter Summary

                             Created on: 2020



Alvina Charles

External Reference #: [id number]

: 1935

Sex: Female



Demographics





                          Address                   [address] [place]  [address]

 

                          Home Phone                [phone]

 

                          Preferred Language        English

 

                          Marital Status            

 

                          Orthodox Affiliation     1013

 

                          Race                      White

 

                          Ethnic Group              Not  or 





Author





                          Author                    [facility] Organization [facility]

 

                          Address                   Unknown

 

                          Phone                     Unavailable







Support





                Name            Relationship    Address         Phone

 

                Krista Honeycutt   ECON            Unknown         [phone]







Care Team Providers





                    Care Team Member Name Role                Phone

 

                    Payam Hutchison   PCP                 [phone]







Encounter Details





                          Care Team                 Description



                     Date                Type                Department  

 

                                        



Kelly Winters RN                       



                     2016          Abstract            [facility]  



                                         [facility]  



                                         [facility]  



                                         [address]  



                                         [address]  



                                         [place], [place] [address] [phone]  







Social History





                                        Date



                 Tobacco Use     Types           Packs/Day       Years Used 

 

                                         



                                         Never Smoker    

 

    



                                         Smokeless Tobacco: Never   



                                         Used   







                    Drinks/Week         oz/Week             Comments



                                         Alcohol Use   

 

                                                             



                                         No   







 



                           Sex Assigned at Birth     Date Recorded

 

 



                                         Not on file 







                                        Industry



                           Job Start Date            Occupation 

 

                                        Not on file



                           Not on file               Not on file 







                                        Travel End



                           Travel History            Travel Start 

 





                                         No recent travel history available.



documented as of this encounter



Plan of Treatment





Not on filedocumented as of this encounter



Procedures





                                        Comments



                 Procedure Name  Priority        Date/Time       Associated Diag

nosis 

 

                                        



Results for this procedure are in the results section.



                     VITAMIN D, 25-HYDROXY  Routine             2015  

 

                                        



Results for this procedure are in the results section.



                     THYROID STIMULATING  Routine             2015  



                                         HORMONE    

 

                                        



Results for this procedure are in the results section.



                     POTASSIUM           Routine             2015  

 

                                        



Results for this procedure are in the results section.



                     CREATININE          Routine             2015  

 

                                        



Results for this procedure are in the results section.



                     ASPARTATE           Routine             2015  



                                         AMINOTRANSFERASE    

 

                                        



Results for this procedure are in the results section.



                     ALANINE AMINOTRANSFERASE  Routine             2015  



documented in this encounter



Results

* Creatinine (2015)



    



              Component    Value        Ref Range    Performed At  Pathologist



                                         Signature

 

    



                 Creatinine      0.6             0.5 - 1.1 mg/dL  [facility] 

 

    



                     Creatinine          0.5 - 1.1 mg/dL     [facility] 

 

    



                           eGFR If                   [facility] 



                                         NonAfricn Am    

 

    



                           eGFR If Africn            [facility] 



                                         Am    











                                         Specimen

 





                                         Blood







   



                 Performing Organization  Address         City/State/Zipcode  Ph

one Number

 

   



                     [facility]                [address], 

[address] 

 

   



                     [facility]                [address], [place] [address]

[address], [place] 





* Potassium (2015)



    



              Component    Value        Ref Range    Performed At  Pathologist



                                         Signature

 

    



                 Potassium       4.7             3.4 - 5.3 mmol/L  [facility] 











                                         Specimen

 





                                         Blood







   



                 Performing Organization  Address         City/[facility]

one Number

 

   



                     [facility]                [address], 

[address] 

 

   



                     [facility]                [address], [place] [address]

[address], [place] 





* Vitamin D, 25-Hydroxy (2015)



    



              Component    Value        Ref Range    Performed At  Pathologist



                                         Signature

 

    



                     Vitamin D           58.24               [facility] 



                                         25-Hydroxy    











                                         Specimen

 





                                         Blood







   



                 Performing Organization  Address         City/State/[facility]

one Number

 

   



                     [facility]                [address], [place] [address]

[address] 

 

   



                     [facility]                [address], [place] [address]

[place], [place] 





* Thyroid Stimulating Hormone (2015)



    



              Component    Value        Ref Range    Performed At  Pathologist



                                         Signature

 

    



                 Thyroid         0.63            0.41 - 5.90 uIU/mL  [facility] 



                                         Stimulating    



                                         Hormone    











                                         Specimen

 





                                         Blood







   



                 Performing Organization  Address         City/State/[facility]

one Number

 

   



                     [facility]                [address] [place], [place] [address]

[address] 

 

   



                     [facility]                [address] [place], [place] [address]

[place], [place] 





* Aspartate Aminotransferase (2015)



    



              Component    Value        Ref Range    Performed At  Pathologist



                                         Signature

 

    



                 Aspartate       24              13 - 35 U/L     [facility] 



                                         Aminotransferas    



                                         e    











                                         Specimen

 





                                         Blood







   



                 Performing Organization  Address         City/State/[facility]

one Number

 

   



                     [facility]                [address] [place], [place] [address]

[address] 

 

   



                     [facility]                [address] [place], [place] [address]

[place], [place] 





* Alanine Aminotransferase (2015)



    



              Component    Value        Ref Range    Performed At  Pathologist



                                         Signature

 

    



                 Alanine         18              7 - 35 U/L      [facility] 



                                         Aminotransferas    



                                         e    











                                         Specimen

 





                                         Blood







   



                 Performing Organization  Address         City/State/[facility]

one Number

 

   



                     [facility]                [address], [place] [address]

[address] 

 

   



                     [facility]                [address] [place] [address]

[address], [place] 





documented in this encounter



Visit Diagnoses

Not on filedocumented in this encounter

## 2020-03-13 NOTE — XMS REPORT
Encounter Summary

                             Created on: 2020



Alvina Charles

External Reference #: [id number]

: 1935

Sex: Female



Demographics





                          Address                   [address] [place]  [address]

 

                          Home Phone                [phone]

 

                          Preferred Language        English

 

                          Marital Status            

 

                          Rastafarian Affiliation     [id number]

 

                          Race                      White

 

                          Ethnic Group              Not  or 





Author





                          Author                    [facility]

 

                          Organization              [facility]

 

                          Address                   Unknown

 

                          Phone                     Unavailable







Support





                Name            Relationship    Address         Phone

 

                Krista Honeycutt   EC[employer]            Unknown         [phone]







Care Team Providers





                    Care Team Member Name Role                Phone

 

                    HutchisonPayam coles   PCP                 [phone]







Reason for Visit

* 



 



                           Reason                    Comments

 

 



                                         Lab results 









Encounter Details





                          Care Team                 Description



                     Date                Type                Department  

 

                                        



Tala Mullins RN                    Lab results



                     2018          Telephone           [facility]  



                                         [facility]  



                                         [facility]  



                                         [address]  



                                         [address]  



                                         [place] [address]  



                                         [phone]  







Social History





                                        Date



                 Tobacco Use     Types           Packs/Day       Years Used 

 

                                         



                                         Never Smoker    

 

    



                                         Smokeless Tobacco: Never   



                                         Used   







                    Drinks/Week         oz/Week             Comments



                                         Alcohol Use   

 

                                                             



                                         No   







 



                           Sex Assigned at Birth     Date Recorded

 

 



                                         Not on file 







                                        Industry



                           Job Start Date            Occupation 

 

                                        Not on file



                           Not on file               Not on file 







                                        Travel End



                           Travel History            Travel Start 

 





                                         No recent travel history available.



documented as of this encounter



Miscellaneous Notes

* Telephone Encounter - Tala Mullins RN - 2018  2:40 PM CDT



Noted results of CBC, BMP, Mag & NTproBNP w/ recs per [facility], called pt to discuss.
Was not able to reach pt but LVM at home # explaining overall results w/ glucose
and NTproBNP being elevated. Stated we hope she was seen in an ER as indicated 
on  and asked her to please call and update us when she has a chance. halina



Electronically signed by Tala Mullins RN at 2018  5:27 PM CDT

* Telephone Encounter - Tala Mullins RN - 2018  2:40 PM CDT



----- Message from Sammie Arellano MD sent at 2018 11:18 AM CDT -----

Normal blood counts, kidney function and blood salts.

Suggestion of elevated pressure in the heart which may relate to retained fluid 
and recommend continuing on lasix.

Blood sugar mildly elevated, follow-up with PCP.

I see notes that she couldn't come to appointment yesterday and was going to go 
to ER but no ER notes.  Can we follow-up please to ensure she has appt planned.



Electronically signed by Tala Mullins RN at 2018  2:40 PM CDT

documented in this encounter



Plan of Treatment





Not on filedocumented as of this encounter



Visit Diagnoses

Not on filedocumented in this encounter

## 2020-03-13 NOTE — XMS REPORT
Encounter Summary

                             Created on: 2020



Alvina Charles

External Reference #: [id number]

: 1935

Sex: Female



Demographics





                          Address                   [address] [place]  [address]

 

                          Home Phone                [phone]

 

                          Preferred Language        English

 

                          Marital Status            

 

                          Oriental orthodox Affiliation     [id number]

 

                          Race                      White

 

                          Ethnic Group              Not  or 





Author





                          Author                    [facility]

 

                          Organization              [facility]

 

                          Address                   Unknown

 

                          Phone                     Unavailable







Support





                Name            Relationship    Address         Phone

 

                Krista Honeycutt   EC[employer]            Unknown         [phone]







Care Team Providers





                    Care Team Member Name Role                Phone

 

                    Payam Hutchison   PCP                 [phone]







Encounter Details





                          Care Team                 Description



                     Date                Type                Department  

 

                                        



Mariam Nguyen MD



[address] [facility] 



[place] [address] [phone] (Fax)                       



                     08/10/2018          Documentation       [facility]  



                                         [facility]  



                                         [facility]  



                                         [address] [place] [address]  



                                         [address]  



                                         [place] [address] [phone]  







Social History





                                        Date



                 Tobacco Use     Types           Packs/Day       Years Used 

 

                                         



                                         Never Smoker    

 

    



                                         Smokeless Tobacco: Never   



                                         Used   







                    Drinks/Week         oz/Week             Comments



                                         Alcohol Use   

 

                                                             



                                         No   







 



                           Sex Assigned at Birth     Date Recorded

 

 



                                         Not on file 







                                        Industry



                           Job Start Date            Occupation 

 

                                        Not on file



                           Not on file               Not on file 







                                        Travel End



                           Travel History            Travel Start 

 





                                         No recent travel history available.



documented as of this encounter



Plan of Treatment





Not on filedocumented as of this encounter



Visit Diagnoses

Not on filedocumented in this encounter

## 2020-03-13 NOTE — XMS REPORT
Encounter Summary

                             Created on: 2020



Alvina Charles

External Reference #: [id number]

: 1935

Sex: Female



Demographics





                          Address                   [address] [place]  [address]

 

                          Home Phone                [phone]

 

                          Preferred Language        English

 

                          Marital Status            

 

                          [employer] Affiliation     [id number]

 

                          Race                      White

 

                          Ethnic Group              Not  or 





Author





                          Author                    [facility]

 

                          Organization              [facility]

 

                          Address                   Unknown

 

                          Phone                     Unavailable







Support





                Name            Relationship    Address         Phone

 

                Krista Honeycutt   [employer]            Unknown         [phone]







Care Team Providers





                    Care Team Member Name Role                Phone

 

                    Payam Hutchison   PCP                 [phone]







Encounter Details





                          Care Team                 Description



                     Date                Type                Department  

 

                                        



Sammie Arellano MD



[address]



[address]



[place] [address]



[phone] (Fax)                      Annual physical exam; 

Mitral valve insufficiency, unspecified etiology



                     2017          Lab                 [facility]

al  



                                         [address]  



                                         [address]  



                                         [place] [address]  



                                         [phone]  







Social History





                                        Date



                 Tobacco Use     Types           Packs/Day       Years Used 

 

                                         



                                         Never Smoker    

 

    



                                         Smokeless Tobacco: Never   



                                         Used   







                    Drinks/Week         oz/Week             Comments



                                         Alcohol Use   

 

                                                             



                                         No   







 



                           Sex Assigned at Birth     Date Recorded

 

 



                                         Not on file 







                                        Industry



                           Job Start Date            Occupation 

 

                                        Not on file



                           Not on file               Not on file 







                                        Travel End



                           Travel History            Travel Start 

 





                                         No recent travel history available.



documented as of this encounter



Plan of Treatment





Not on filedocumented as of this encounter



Procedures





                                        Comments



                 Procedure Name  Priority        Date/Time       Associated Diag

nosis 

 

                                        



Results for this procedure are in the results section.



                 THYROID CASCADE  Routine         2017      Annual physica

l exam 



                           4:30 PM CST               Mitral valve 



                                         insufficiency, 



                                         unspecified etiology 

 

                                        



Results for this procedure are in the results section.



                 NTPROBNP        Routine         2017      Annual physical

 exam 



                           4:30 PM CST               Mitral valve 



                                         insufficiency, 



                                         unspecified etiology 

 

                                        



Results for this procedure are in the results section.



                     IRON/TRANSFERRIN    Routine             2017  



                                         4:30 PM CST  

 

                                        



Results for this procedure are in the results section.



                     FERRITIN            Routine             2017  



                                         4:30 PM CST  

 

                                        



Results for this procedure are in the results section.



                 COMPREHENSIVE METABOLIC  Routine         2017      Annual

 physical exam 



                     PANEL               4:30 PM CST         Mitral valve 



                                         insufficiency, 



                                         unspecified etiology 

 

                                        



Results for this procedure are in the results section.



                 CBC AND DIFF (MANUAL DIFF  Routine         2017      Liz

al physical exam 



                     IF NECESSARY)       4:30 PM CST         Mitral valve 



                                         insufficiency, 



                                         unspecified etiology 



documented in this encounter



Results

* Ferritin (2017  4:30 PM CST)



    



              Component    Value        Ref Range    Performed At  Pathologist



                                         Signature

 

    



                 Ferritin        30              20 - 200 ng/mL  [facility] 











                                         Specimen

 









   



                 Performing Organization  Address         City/[facility]

one Number

 

   



                 [facility] [address] [place] [address]

  [phone]



                                         LABORATORIES   





* Iron/Transferrin (2017  4:30 PM CST)



    



              Component    Value        Ref Range    Performed At  Pathologist



                                         Signature

 

    



                 Iron            110             50 - 180 ug/dL  [facility] 

 

    



                 Transferrin     308             206 - 381 mg/dL  [facility] 

 

    



                 Total           370             204 - 408 ug/dL  [facility] 



                           Iron-Binding              [facility]AL 



                           Capacity                  LAB[facility]ATORIES 

 

    



                 Iron/Transferri  30              15 - 50 %       [facility] 



                           n % Saturation            [facility] 



                                         [facility]IES 











                                         Specimen

 









   



                 Performing Organization  Address         City/State/Zipcode  Ph

one Number

 

   



                 [facility] [address] [place] [address]

  [phone]



                                         LABORATORIES   





* NTproBNP (2017  4:30 PM CST)



    



              Component    Value        Ref Range    Performed At  Pathologist



                                         Signature

 

    



                 NTproBNP        190             pg/mL           [facility] 



                           Comment:                  [facility] 



                           NT-proBNP Reference Ranges:   LABORATORIES 



                                          <50 yr        



                                         <450 pg/mL   



                                          50-75 yr       



                                         <900 pg/mL   



                                          >75 yr        



                                         <1800 pg/mL   



                                         A cutoff value of 1200 pg/mL   



                                         is recommended in patients 50   



                                         to 70 years of age with a GFR   



                                         between 30 and 60. NT-proBNP   



                                         is unreliable in patients with   



                                         GFR <30.   











                                         Specimen

 





                                         Blood







   



                 Performing Organization  Address         City/State/[facility]

one Number

 

   



                 [facility] [address] [place] [address]

  [phone]



                                         LABORATORIES   





* Thyroid McCurtain (2017  4:30 PM CST)



    



              Component    Value        Ref Range    Performed At  Pathologist



                                         Signature

 

    



                 Thyroid         0.90            0.47 - 4.68 uIU/mL  [facility] 



                           Stimulating               [facility] 



                           Hormone                   LAB[facility]ATORIES 











                                         Specimen

 





                                         Blood







   



                 Performing Organization  Address         City/State/[facility]

one Number

 

   



                 [facility] [address] [place] [address]

  [phone]



                                         LABORATORIES   





* Comprehensive Metabolic Panel (2017  4:30 PM CST)



    



              Component    Value        Ref Range    Performed At  Pathologist



                                         Signature

 

    



                 Sodium          140             133 - 147 MEQ/L  [facility] 

 

    



                 Potassium       4.6             3.5 - 5.3 MEQ/L  [facility] 

 

    



                 Chloride        101             96 - 112 MEQ/L  [facility] 

 

    



                 Carbon Dioxide  31              20 - 32 MEQ/L   [facility] 

 

    



                 Anion Gap       9               5 - 17          [facility] 

 

    



                 Calcium         9.5             8.4 - 10.5 mg/dL  [facility] 

 

    



                 Glucose         118 (H)         70 - 100 mg/dL  [facility] 

 

    



                 Protein Total   7.1             6.0 - 8.2 g/dL  [facility] 



                           Serum                     [facility] 

 

    



                 Albumin         4.1             3.5 - 5.0 g/dL  [facility] 

 

    



                 Alkaline        99              42 - 140 IU/L   [facility] 



                           Phosphatase               [facility] 

 

    



                 Alanine         14              13 - 69 IU/L    [facility] 



                           Aminotransferas           [facility] 



                           e                         LABORATORIES 

 

    



                 Aspartate       27              15 - 46 IU/L    [facility] 



                           Aminotransferas           [facility] 



                           e                         LABORATORIES 

 

    



                 Bilirubin Total  0.6             0.2 - 1.3 mg/dL  [facility] 

 

    



                 Blood Urea      17              7 - 26 mg/dL    [facility] 



                           Nitrogen                  [facility] 

 

    



                 Creatinine      0.8             0.4 - 1.1 mg/dL  [facility] 

 

    



                 eGFR Female AA  83              60 - 200        [facility] 



                           Comment:                  [facility] 



                           Chronic Kidney Disease less   LABORATORIES 



                                         than 60 mL/min/1.73 sq.m   



                                         Kidney failure less than 15   



                                         mL/min/1.73 sq.m   

 

    



                 eGFR Female     69              60 - 200        [facility] 



                     Non-AA              Comment:            REGIONAL 



                           Chronic Kidney Disease less   LABORATORIES 



                                         than 60 mL/min/1.73 sq.m   



                                         Kidney failure less than 15   



                                         mL/min/1.73 sq.m   











                                         Specimen

 





                                         Blood







   



                 Performing Organization  Address         City/State/Zipcode  Ph

one Number

 

   



                 [facility]  [address] [place] [address]

  [phone]



                                         LABORATORIES   





* CBC and Diff (manual diff if necessary) (2017  4:30 PM CST)



    



              Component    Value        Ref Range    Performed At  Pathologist



                                         Signature

 

    



                 WBC             6.63            4.00 - 11.00 TH/uL  [facility] 



                                         [facility] 

 

    



                 RBC             4.36            4.00 - 5.00 MIL/uL  [facility] 

 

    



                 Hemoglobin      13.3            12.0 - 15.0 g/dL  [facility] 

 

    



                 Hematocrit      40              36 - 45 %       [facility] 

 

    



                 MCV             92              80 - 99 fL      [facility] 

 

    



                 MCH             31              27 - 34 pg      [facility] 

 

    



                 MCHC            33              32 - 36 %       [facility] 

 

    



                 RDW             13.0            9.0 - 14.5 %    [facility] 

 

    



                 Platelet Count  214             140 - 400 TH/uL  [facility] 

 

    



                 MPV             11.7            9.4 - 12.3 fL   [facility] 

 

    



                 Nucleated RBCs  0               0 - 0 /100      [facility] 

 

    



                 % Neutrophils   60              45 - 78 %       [facility] 

 

    



                 %Lymphocytes    26              15 - 47 %       [facility] 

 

    



                 %Monocytes      12              0 - 12 %        [facility] 

 

    



                 %Eosinophils    2               0 - 7 %         [facility] 

 

    



                 %Basophils      1               0 - 2 %         [facility] 

 

    



                 % Imm Grans     0               0 - 1 %         [facility] 

 

    



                 # Granulocytes  3.96            1.70 - 6.80 TH/uL  [facility] 



                                         [facility] 

 

    



                 # Lymphocytes   1.73            1.00 - 3.30 TH/uL  [facility] 



                                         [facility] 

 

    



                 # Monocytes     0.76            0.20 - 0.90 TH/uL  [facility] 



                                         [facility] 

 

    



                 # Eosinophils   0.13            0.00 - 0.40 TH/uL  [facility] 



                                         [facility] 

 

    



                 # Basophils     0.04            0.00 - 0.10 TH/uL  [facility] 



                                         [facility] 











                                         Specimen

 





                                         Blood







   



                 Performing Organization  Address         City/State/Zipcode  Ph

one Number

 

   



                 [facility] [address] [place] [address]

  [phone]



                                         LABORATORIES   





documented in this encounter



Visit Diagnoses









                                         Diagnosis

 





                                         Annual physical exam



                                         Routine general medical examination at 

a health care facility

 





                                         Mitral valve insufficiency, unspecified

 etiology



documented in this encounter

## 2020-03-13 NOTE — XMS REPORT
Encounter Summary

                             Created on: 2020



Alvina Charles

External Reference #: [id number]

: 1935

Sex: Female



Demographics





                          Address                   [address] [place]  [address]

 

                          Home Phone                [phone]

 

                          Preferred Language        English

 

                          Marital Status            

 

                          Church Affiliation     [id number]

 

                          Race                      White

 

                          Ethnic Group              Not  or 





Author





                          Author                    [facility]

 

                          Organization              [facility]

 

                          Address                   Unknown

 

                          Phone                     Unavailable







Support





                Name            Relationship    Address         Phone

 

                Krista Honeycutt   [employer]            Unknown         [phone]







Care Team Providers





                    Care Team Member Name Role                Phone

 

                    Payam Hutchison   PCP                 [phone]







Encounter Details





                          Care Team                 Description



                     Date                Type                Department  

 

                                        



Sammie Arellano MD



[address]



[address]



[place] [address] [phone] (Fax)                       



                     2017          Documentation       [facility]  



                                         [facility]  



                                         [facility]  



                                         [address]  



                                         [address]  



                                         [place] [id number] [phone]  







Social History





                                        Date



                 Tobacco Use     Types           Packs/Day       Years Used 

 

                                         



                                         Never Smoker    

 

    



                                         Smokeless Tobacco: Never   



                                         Used   







                    Drinks/Week         oz/Week             Comments



                                         Alcohol Use   

 

                                                             



                                         No   







 



                           Sex Assigned at Birth     Date Recorded

 

 



                                         Not on file 







                                        Industry



                           Job Start Date            Occupation 

 

                                        Not on file



                           Not on file               Not on file 







                                        Travel End



                           Travel History            Travel Start 

 





                                         No recent travel history available.



documented as of this encounter



Plan of Treatment





                                        Order Schedule



                 Name            Type            Priority        Associated Diag

noses 

 

                                        Ordered: 2017



                           PULMONARY FUNCTION TEST   PFT   



documented as of this encounter



Procedures





                                        Comments



                 Procedure Name  Priority        Date/Time       Associated Diag

nosis 

 

                                        



Results for this procedure are in the results section.



                     ECHO OUTSIDE RECORD  Routine             2017  

 

                                        



Results for this procedure are in the results section.



                           LAB SUMMARY               2017  



                                         12:59 PM CST  

 

                                        



Results for this procedure are in the results section.



                           LAB SUMMARY               2017  



                                         3:28 PM CST  

 

                                        



Results for this procedure are in the results section.



                           LAB SUMMARY               2017  



                                         3:28 PM CST  

 

                                        



Results for this procedure are in the results section.



                           LAB SUMMARY               2017  



                                         3:28 PM CST  

 

                                        



Results for this procedure are in the results section.



                           LAB SUMMARY               2017  



                                         3:28 PM CST  

 

                                        



Results for this procedure are in the results section.



                           LAB SUMMARY               2017  



                                         3:27 PM CST  

 

                                        



Results for this procedure are in the results section.



                     GLUCOSE             Routine             2016  

 

                                         



                     CT OUTSIDE RECORD   Routine             2016  

 

                                        



Results for this procedure are in the results section.



                     CORONARY ANGIOGRAM  Routine             2016  



                                         OUTSIDE RECORD    

 

                                        



Results for this procedure are in the results section.



                     US OUTSIDE RECORD   Routine             2016  



documented in this encounter



Results

* Echo Outside Record (2017)



    



              Component    Value        Ref Range    Performed At  Pathologist



                                         Signature

 

    



                                         Ejection    



                                         Fraction    











                                         Specimen

 









 



                           Impressions               Performed At

 

 



                                         Mild mitral regurgitation and mild tric

uspid regurgitation and mild pulmonic 



                                         regurgitation. Estimated PAP normal. No

rmal cardiac chambers and cardiac 



                                         contractility. [facility] 







 



                           Narrative                 Performed At

 

 



                                         This result has an attachment that is n

ot available. 





* LAB SUMMARY (2017 12:59 PM CST)



 



                           Narrative                 Performed At

 

 



                                         This result has an attachment that is n

ot available. 



                                         Ordered by an unspecified provider. 





* LAB SUMMARY (2017  3:28 PM CST)



 



                           Narrative                 Performed At

 

 



                                         This result has an attachment that is n

ot available. 



                                         Ordered by an unspecified provider. 





* LAB SUMMARY (2017  3:28 PM CST)



 



                           Narrative                 Performed At

 

 



                                         This result has an attachment that is n

ot available. 



                                         Ordered by an unspecified provider. 





* LAB SUMMARY (2017  3:28 PM CST)



 



                           Narrative                 Performed At

 

 



                                         This result has an attachment that is n

ot available. 



                                         Ordered by an unspecified provider. 





* LAB SUMMARY (2017  3:28 PM CST)



 



                           Narrative                 Performed At

 

 



                                         This result has an attachment that is n

ot available. 



                                         Ordered by an unspecified provider. 





* LAB SUMMARY (2017  3:27 PM CST)



 



                           Narrative                 Performed At

 

 



                                         This result has an attachment that is n

ot available. 



                                         Ordered by an unspecified provider. 





* Glucose (2016)



    



              Component    Value        Ref Range    Performed At  Pathologist



                                         Signature

 

    



                     Glucose             102                 mg/dL  











                                         Specimen

 





                                         Blood







 



                           Narrative                 Performed At

 

 



                                         This result has an attachment that is n

ot available. 





* CT Outside Record (2016)







                                         Specimen

 









 



                           Narrative                 Performed At

 

 



                                         This result has an attachment that is n

ot available. 





* Coronary Angiogram Outside Record (2016)







                                         Specimen

 









 



                           Impressions               Performed At

 

 



                                         No angiographic evidence of coronary ar

roberto disease. ([facility]) 







 



                           Narrative                 Performed At

 

 



                                         This result has an attachment that is n

ot available. 





* US Outside Record (2016)







                                         Specimen

 









 



                           Impressions               Performed At

 

 



                                         No evidence for hemodynamically signifi

cant stenosis of arterial system of 



                                         either lower extremity. ([facility]) 







 



                           Narrative                 Performed At

 

 



                                         This result has an attachment that is n

ot available. 





documented in this encounter



Visit Diagnoses

Not on filedocumented in this encounter

## 2020-03-13 NOTE — XMS REPORT
Encounter Summary

                             Created on: 2020



Alvina Charles

External Reference #: [id number]

: 1935

Sex: Female



Demographics





                          Address                   [address] [place]  [address]

 

                          Home Phone                [phone]

 

                          Preferred Language        English

 

                          Marital Status            

 

                          Zoroastrian Affiliation     [id number]

 

                          Race                      White

 

                          Ethnic Group              Not  or 





Author





                          Author                    [facility]

 

                          Organization              [facility]

 

                          Address                   Unknown

 

                          Phone                     Unavailable







Support





                Name            Relationship    Address         Phone

 

                Krista Honeycutt   [employer]            Unknown         [phone]







Care Team Providers





                    Care Team Member Name Role                Phone

 

                    Payam Hutchison   PCP                 [phone]







Encounter Details





                          Care Team                 Description



                     Date                Type                Department  

 

                                        



Brennan Yoon MD



[address] [facility] 



[place] [id number] [phone] (Fax)                       



                     2016          Documentation       [facility]  



                                         [facility]  



                                         [facility]  



                                         [address] [place] [address]  



                                         [address]  



                                         [place] [id number] [phone]  







Social History





                                        Date



                 Tobacco Use     Types           Packs/Day       Years Used 

 

                                         



                                         Never Smoker    

 

    



                                         Smokeless Tobacco: Never   



                                         Used   







                    Drinks/Week         oz/Week             Comments



                                         Alcohol Use   

 

                                                             



                                         No   







 



                           Sex Assigned at Birth     Date Recorded

 

 



                                         Not on file 







                                        Industry



                           Job Start Date            Occupation 

 

                                        Not on file



                           Not on file               Not on file 







                                        Travel End



                           Travel History            Travel Start 

 





                                         No recent travel history available.



documented as of this encounter



Plan of Treatment





Not on filedocumented as of this encounter



Visit Diagnoses

Not on filedocumented in this encounter

## 2020-03-13 NOTE — XMS REPORT
Encounter Summary

                             Created on: 2020



Alvina Charles

External Reference #: [id number]

: 1935

Sex: Female



Demographics





                          Address                   [address] [place]  [address]

 

                          Home Phone                [phone]

 

                          Preferred Language        English

 

                          Marital Status            

 

                          Yarsanism Affiliation     [id number]

 

                          Race                      White

 

                          Ethnic Group              Not  or 





Author





                          Author                    [facility]

 

                          Organization              [facility]

 

                          Address                   Unknown

 

                          Phone                     Unavailable







Support





                Name            Relationship    Address         Phone

 

                Krista Honeycutt   EC[employer]            Unknown         [phone]







Care Team Providers





                    Care Team Member Name Role                Phone

 

                    Payam Hutchison   PCP                 [phone]







Encounter Details





                          Care Team                 Description



                     Date                Type                Department  

 

                                        



Mariam Nguyen MD



[address]



[address]



[place], [place] [id number] [phone] (Fax)                      Laboratory exam ordered as part of [facility] general medical 

examination; 

Bradycardia



                     2019          Lab                 [facility]

al  



                                         [address]  



                                         [address]  



                                         [place] [address]  



                                         [phone]  







Social History





                                        Date



                 Tobacco Use     Types           Packs/Day       Years Used 

 

                                         



                                         Never Smoker    

 

    



                                         Smokeless Tobacco: Never   



                                         Used   







                    Drinks/Week         oz/Week             Comments



                                         Alcohol Use   

 

                                                             



                                         No   







 



                           Sex Assigned at Birth     Date Recorded

 

 



                                         Not on file 







                                        Industry



                           Job Start Date            Occupation 

 

                                        Not on file



                           Not on file               Not on file 







                                        Travel End



                           Travel History            Travel Start 

 





                                         No recent travel history available.



documented as of this encounter



Plan of Treatment





Not on filedocumented as of this encounter



Procedures





                                        Comments



                 Procedure Name  Priority        Date/Time       Associated Diag

nosis 

 

                                        



Results for this procedure are in the results section.



                     TRIIODOTHYRONINE    Add-On              2019  



                                         3:06 PM CDT  

 

                                        



Results for this procedure are in the results section.



                 THYROID STIMULATING  Routine         2019      [facility]

a 



                     HORMONE             3:06 PM CDT         Laboratory exam ord

ered 



                                         as part of routine 



                                         general medical 



                                         examination 

 

                                        



Results for this procedure are in the results section.



                     THYROID CASCADE     Add-On              2019  



                                         3:06 PM CDT  

 

                                        



Results for this procedure are in the results section.



                     T4 FREE             Add-On              2019  



                                         3:06 PM CDT  

 

                                        



Results for this procedure are in the results section.



                 COMPREHENSIVE METABOLIC  Routine         2019      Labora

tory exam ordered 



                     PANEL               3:06 PM CDT         as part of routine 



                                         general medical 



                                         examination 

 

                                        



Results for this procedure are in the results section.



                 CBC AND DIFF (MANUAL DIFF  Routine         2019      [facility]

ratory exam ordered 



                     IF NECESSARY)       3:06 PM CDT         as part of routine 



                                         general medical 



                                         examination 



documented in this encounter



Results

* Triiodothyronine (2019  3:06 PM CDT)



    



              Component    Value        Ref Range    Performed At  Pathologist



                                         Signature

 

    



                 Triiodothyronin  1.0             1.0 - 1.7 ng/mL  [facility] 











                                         Specimen

 





                                         Blood







   



                 Performing Organization  Address         [facility]

one Number

 

   



                 [facility] [address] [place] [address]

  [phone]



                                         LABORATORIES   





* T4 Free (2019  3:06 PM CDT)



    



              Component    Value        Ref Range    Performed At  Pathologist



                                         Signature

 

    



                 T4 Free         1.0             0.8 - 2.2 ng/dL  [facility] 











                                         Specimen

 





                                         Blood







   



                 Performing Organization  Address         [facility]

one Number

 

   



                 [facility] [address] [place] [address]

  [phone]



                                         LABORATORIES   





* Thyroid Albion (2019  3:06 PM CDT)



    



              Component    Value        Ref Range    Performed At  Pathologist



                                         Signature

 

    



                 Thyroid         0.14 (L)        0.47 - 4.68 uIU/mL  [facility] 



                           [facility]                   [facility] 











                                         Specimen

 





                                         Blood







   



                 Performing Organization  Address         [facility]

one Number

 

   



                 [facility] [address] [place] [address]

  [phone]



                                         LABORATORIES   





* Thyroid Stimulating Hormone (2019  3:06 PM CDT)



    



              Component    Value        Ref Range    Performed At  Pathologist



                                         Signature

 

    



                 Thyroid         0.15 (L)        0.47 - 4.68 uIU/mL  [facility] 



                           [facility]               [facility] 



                           Hormone                   [facility] 











                                         Specimen

 





                                         Blood







   



                 Performing Organization  Address         [facility]

one Number

 

   



                 [facility] [address] [place] [address]

  [phone]



                                         LABORATORIES   





* Comprehensive Metabolic Panel (2019  3:06 PM CDT)



    



              Component    Value        Ref Range    Performed At  Pathologist



                                         Signature

 

    



                 Sodium          138             133 - 147 MEQ/L  [facility] 

 

    



                 Potassium       4.6             3.5 - 5.3 MEQ/L  [facility] 

 

    



                 Chloride        103             96 - 112 MEQ/L  [facility] 

 

    



                 Carbon Dioxide  30              20 - 32 MEQ/L   [facility] 

 

    



                 Anion Gap       6               5 - 17          [facility] 

 

    



                 Calcium         9.3             8.4 - 10.5 mg/dL  [facility] 

 

    



                 Glucose         88              70 - 100 mg/dL  [facility] 

 

    



                 Protein Total   6.5             6.0 - 8.2 g/dL  [facility] 



                           Serum                     [facility] 

 

    



                 Albumin         3.7             3.5 - 5.0 g/dL  [facility] 

 

    



                 Alkaline        85              42 - 140 IU/L   [facility] 



                           Phosphatase               [facility] 



                                         [facility] 

 

    



                 Alanine         25              0 - 34 IU/L     [facility] 



                           Aminotransferas           [facility] 



                           e                         [facility] 

 

    



                 Aspartate       31              15 - 46 IU/L    [facility] 



                           Aminotransferas           [facility] 



                           e                         [facility] 

 

    



                 Bilirubin Total  0.4             0.2 - 1.3 mg/dL  [facility] 

 

    



                 Blood Urea      19              7 - 26 mg/dL    [facility] 



                           Nitrogen                  [facility] 

 

    



                 Creatinine      0.7             0.4 - 1.1 mg/dL  [facility] 

 

    



                 eGFR Female AA  96              60 - 200        [facility] 



                           mL/min/1.73sq m           [facility] 



                                         [facility] 

 

    



                 eGFR Female     80              60 - 200        [facility] 



                     Non-AA              mL/min/1.73sq m     [facility] 



                                         [facility] 











                                         Specimen

 





                                         Blood







   



                 Performing Organization  Address         City/State/Zipcode  Ph

one Number

 

   



                 [facility] [address] [place] [address]

  [phone]



                                         LABORATORIES   





* CBC and Diff (manual diff if necessary) (2019  3:06 PM CDT)



    



              Component    Value        Ref Range    Performed At  Pathologist



                                         Signature

 

    



                 WBC             6.66            4.00 - 11.00 TH/uL  [facility] 

 

    



                 RBC             4.18            4.00 - 5.00 MIL/uL  [facility] 

 

    



                 Hemoglobin      12.9            12.0 - 15.0 g/dL  [facility] 

 

    



                 Hematocrit      39              36 - 45 %       [facility] 

 

    



                 MCV             93              80 - 99 fL      [facility] 

 

    



                 MCH             31              27 - 34 pg      [facility] 

 

    



                 MCHC            33              32 - 36 %       [facility] 

 

    



                 RDW             13.3            11.5 - 14.5 %   [facility] 

 

    



                 Platelet Count  190             140 - 400 TH/uL  [facility] 

 

    



                 MPV             12.2            9.4 - 12.3 fL   [facility] 

 

    



                 Nucleated RBCs  0               0 - 0 /100      [facility] 

 

    



                 % Neutrophils   68              45 - 78 %       [facility] 

 

    



                 %Lymphocytes    17              15 - 47 %       [facility] 

 

    



                 %Monocytes      11              0 - 12 %        [facility] 

 

    



                 %Eosinophils    2               0 - 7 %         [facility] 

 

    



                 %Basophils      1               0 - 2 %         [facility] 

 

    



                 % Imm Grans     0               0 - 1 %         [facility] 

 

    



                 # Granulocytes  4.57            1.70 - 6.80 TH/uL  [facility] 



                                         [facility] 

 

    



                 # Lymphocytes   1.13            1.00 - 3.30 TH/uL  [facility] 



                                         [facility] 

 

    



                 # Monocytes     0.76            0.20 - 0.90 TH/uL  [facility] 



                                         [facility] 

 

    



                 # Eosinophils   0.16            0.00 - 0.40 TH/uL  [facility] 



                                         [facility] 

 

    



                 # Basophils     0.04            0.00 - 0.10 TH/uL  [facility] 



                                         [facility] 











                                         Specimen

 





                                         Blood







   



                 Performing Organization  Address         City/State/Zipcode  Ph

one Number

 

   



                 [facility]  [address] [place] [address]

  [phone]



                                         LABORATORIES   





documented in this encounter



Visit Diagnoses









                                         Diagnosis

 





                                         Laboratory exam ordered as part of rout

ine general medical examination



                                         Laboratory examination ordered as part 

of a routine general medical examination

 





                                         Bradycardia



                                         Other specified cardiac dysrhythmias



documented in this encounter

## 2020-03-13 NOTE — XMS REPORT
Encounter Summary

                             Created on: 2020



Alvina Charles

External Reference #: [id number]

: 1935

Sex: Female



Demographics





                          Address                   [address] [place]  [address]

 

                          Home Phone                [phone]

 

                          Preferred Language        English

 

                          Marital Status            

 

                          Faith Affiliation     [id number]

 

                          Race                      White

 

                          Ethnic Group              Not  or 





Author





                          Author                    [facility]

 

                          Organization              [facility]

 

                          Address                   Unknown

 

                          Phone                     Unavailable







Support





                Name            Relationship    Address         Phone

 

                Krista Honeycutt   [employer]            Unknown         [phone]







Care Team Providers





                    Care Team Member Name Role                Phone

 

                    Payam Hutchison   PCP                 [phone]







Encounter Details





                          Care Team                 Description



                     Date                Type                Department  

 

                                        



Brennan Yoon MD



[address] [facility] 



[place] [id number] [phone] (Fax)                       



                     2016          Documentation       [facility]  



                                         [facility]  



                                         [address] [place] [address]  



                                         [address]  



                                         [place] [id number] [phone]  







Social History





                                        Date



                 Tobacco Use     Types           Packs/Day       Years Used 

 

                                         



                                         Never Smoker    

 

    



                                         Smokeless Tobacco: Never   



                                         Used   







                    Drinks/Week         oz/Week             Comments



                                         Alcohol Use   

 

                                                             



                                         No   







 



                           Sex Assigned at Birth     Date Recorded

 

 



                                         Not on file 







                                        Industry



                           Job Start Date            Occupation 

 

                                        Not on file



                           Not on file               Not on file 







                                        Travel End



                           Travel History            Travel Start 

 





                                         No recent travel history available.



documented as of this encounter



Plan of Treatment





Not on filedocumented as of this encounter



Visit Diagnoses

Not on filedocumented in this encounter

## 2020-03-13 NOTE — XMS REPORT
Encounter Summary

                             Created on: 2020



Alvina Charles

External Reference #: [id number]

: 1935

Sex: Female



Demographics





                          Address                   [address] [place]  [address]

 

                          Home Phone                [phone]

 

                          Preferred Language        English

 

                          Marital Status            

 

                          Judaism Affiliation     [id number]

 

                          Race                      White

 

                          Ethnic Group              Not  or 





Author





                          Author                    [facility]

 

                          Organization              [facility]

 

                          Address                   Unknown

 

                          Phone                     Unavailable







Support





                Name            Relationship    Address         Phone

 

                Krista Honeycutt   EC[employer]            Unknown         [phone]







Care Team Providers





                    Care Team Member Name Role                Phone

 

                    Payam Hutchison   PCP                 [phone]







Encounter Details





                          Care Team                 Description



                     Date                Type                Department  

 

                                        



Jasmin Montiel RN                      



                     2018          Abstract            [facility]  



                                         [address]  



                                         [address]  



                                         [place] [address]  



                                         [phone]  







Social History





                                        Date



                 Tobacco Use     Types           Packs/Day       Years Used 

 

                                         



                                         Never Smoker    

 

    



                                         Smokeless Tobacco: Never   



                                         Used   







                    Drinks/Week         oz/Week             Comments



                                         Alcohol Use   

 

                                                             



                                         No   







 



                           Sex Assigned at Birth     Date Recorded

 

 



                                         Not on file 







                                        Industry



                           Job Start Date            Occupation 

 

                                        Not on file



                           Not on file               Not on file 







                                        Travel End



                           Travel History            Travel Start 

 





                                         No recent travel history available.



documented as of this encounter



Plan of Treatment





Not on filedocumented as of this encounter



Procedures





                                        Comments



                 Procedure Name  Priority        Date/Time       Associated Diag

nosis 

 

                                        



Results for this procedure are in the results section.



                     HOLTER OUTSIDE RECORD  Routine             08/10/2018  

 

                                        



Results for this procedure are in the results section.



                     ECHO OUTSIDE RECORD  Routine             08/10/2018  



documented in this encounter



Results

* Holter Outside Record (08/10/2018)



 



                           Impressions               Performed At

 

 



                                         [facility] ([facility]) 



                                         IMPRESSION:  



                                         1. Sinus rhythm with brief periods of m

ild sinus bradycardia and periods of 



                                         sinus tachycardia. 



                                         2. Isolated PVCs. 



                                         3. Occasional PACs with 3 very brief (3

- to 5-beat) runs of supraventricular 



                                         tachycardia. 



                                         4. No pauses. 





* Echo Outside Record (08/10/2018)



    



              Component    Value        Ref Range    Performed At  Pathologist



                                         Signature

 

    



                                         Ejection    



                                         Fraction    







 



                           Impressions               Performed At

 

 



                                         [facility] ([facility]) 



                                         Result Impression: 



                                         1. Normal left ventricular size and sys

tolic function. 



                                         2. Moderate to severe mitral regurgitat

ion with surprisingly normal left 



                                         atrial size. 



                                         3. Mild to moderate tricuspid regurgita

tion with calculated RV systolic 



                                         pressure consistent with moderate pulmo

nary hypertension. There is 



                                         associated right-sided chamber enlargem

ent. 



                                         4. Mild pulmonic insufficiency. 



                                         5. Aortic valve sclerosis with no signi

ficant aortic stenosis. 





documented in this encounter



Visit Diagnoses









                                         Diagnosis

 





                                         Family history of coronary artery disea

se



                                         Family history of ischemic heart diseas

e

 





                                         Chronic diastolic congestive heart fail

ure (HCC)



documented in this encounter

## 2020-03-13 NOTE — XMS REPORT
Encounter Summary

                             Created on: 2020



Alvina Charles

External Reference #: [id number]

: 1935

Sex: Female



Demographics





                          Address                   [address] [place]  [address]

 

                          Home Phone                [phone]

 

                          Preferred Language        English

 

                          Marital Status            

 

                          Anabaptist Affiliation     [id number]

 

                          Race                      White

 

                          Ethnic Group              Not  or 





Author





                          Author                    [facility]

 

                          Organization              [facility]

 

                          Address                   Unknown

 

                          Phone                     Unavailable







Support





                Name            Relationship    Address         Phone

 

                Krista Honeycutt   [employer]            Unknown         [phone]







Care Team Providers





                    Care Team Member Name Role                Phone

 

                    Payam Hutchison   PCP                 [phone]







Encounter Details





                          Care Team                 Description



                     Date                Type                Department  

 

                                        



Mariam Nguyen MD



[address]



[place] 



[place] [address]



[phone] ([phone]                       



                     2018          Documentation       [facility]  



                                         [facility]  



                                         [address]  



                                         [address]   



                                         [place] [address]  



                                         [phone]  







Social History





                                        Date



                 Tobacco Use     Types           Packs/Day       Years Used 

 

                                         



                                         Never Smoker    

 

    



                                         Smokeless Tobacco: Never   



                                         Used   







                    Drinks/Week         oz/Week             Comments



                                         Alcohol Use   

 

                                                             



                                         No   







 



                           Sex Assigned at Birth     Date Recorded

 

 



                                         Not on file 







                                        Industry



                           Job Start Date            Occupation 

 

                                        Not on file



                           Not on file               Not on file 







                                        Travel End



                           Travel History            Travel Start 

 





                                         No recent travel history available.



documented as of this encounter



Plan of Treatment





Not on filedocumented as of this encounter



Visit Diagnoses

Not on filedocumented in this encounter

## 2020-03-13 NOTE — XMS REPORT
Encounter Summary

                             Created on: 2020



Alvina Charles

External Reference #: [id number]

: 1935

Sex: Female



Demographics





                          Address                   [address] [place]  [address]

 

                          Home Phone                [phone]

 

                          Preferred Language        English

 

                          Marital Status            

 

                          Sikhism Affiliation     [id number]

 

                          Race                      White

 

                          Ethnic Group              Not  or 





Author





                          Author                    [facility]

 

                          Organization              [facility]

 

                          Address                   Unknown

 

                          Phone                     Unavailable







Support





                Name            Relationship    Address         Phone

 

                Krista Honeycutt   [employer]            Unknown         [phone]







Care Team Providers





                    Care Team Member Name Role                Phone

 

                    Payam Hutchison   PCP                 [phone]







Encounter Details





                          Care Team                 Description



                     Date                Type                Department  

 

                                        



Wai Ji MD



[address] 



[place] [address]



[phone] (Fax)                       



                     2016          Documentation       [facility]  



                                         [facility]  



                                         [facility]  



                                         [address]  



                                         [address]  



                                         [place] [address]  



                                         [phone]  







Social History





                                        Date



                 Tobacco Use     Types           Packs/Day       Years Used 

 

                                         



                                         Never Smoker    

 

    



                                         Smokeless Tobacco: Never   



                                         Used   







                    Drinks/Week         oz/Week             Comments



                                         Alcohol Use   

 

                                                             



                                         No   







 



                           Sex Assigned at Birth     Date Recorded

 

 



                                         Not on file 







                                        Industry



                           Job Start Date            Occupation 

 

                                        Not on file



                           Not on file               Not on file 







                                        Travel End



                           Travel History            Travel Start 

 





                                         No recent travel history available.



documented as of this encounter



Plan of Treatment





Not on filedocumented as of this encounter



Visit Diagnoses

Not on filedocumented in this encounter

## 2020-03-13 NOTE — XMS REPORT
Encounter Summary

                             Created on: 2020



IraidamariamAlvinaTuyet

External Reference #: [id number]

: 1935

Sex: Female



Demographics





                          Address                   [address] [place]  [address]

 

                          Home Phone                [phone]

 

                          Preferred Language        English

 

                          Marital Status            

 

                          Latter day Affiliation     [id number]

 

                          Race                      White

 

                          Ethnic Group              Not  or 





Author





                          Author                    [facility]

 

                          Organization              [facility]

 

                          Address                   Unknown

 

                          Phone                     Unavailable







Support





                Name            Relationship    Address         Phone

 

                Krista Honeycutt   [employer]            Unknown         [phone]







Care Team Providers





                    Care Team Member Name Role                Phone

 

                    Hutchison Payam   PCP                 [phone]







Reason for Visit

* 



 



                           Reason                    Comments

 

 



                           Procedure Instructions    SIXTO









Encounter Details





                          Care Team                 Description



                     Date                Type                Department  

 

                                        



Cydney New RN                    Procedure Instructions (SIXTO)



                     2017          Telephone           [facility]  



                                         [address]  



                                         [address]  



                                         [place] [address]  



                                         [phone]  







Social History





                                        Date



                 Tobacco Use     Types           Packs/Day       Years Used 

 

                                         



                                         Never Smoker    

 

    



                                         Smokeless Tobacco: Never   



                                         Used   







                    Drinks/Week         oz/Week             Comments



                                         Alcohol Use   

 

                                                             



                                         No   







 



                           Sex Assigned at Birth     Date Recorded

 

 



                                         Not on file 







                                        Industry



                           Job Start Date            Occupation 

 

                                        Not on file



                           Not on file               Not on file 







                                        Travel End



                           Travel History            Travel Start 

 





                                         No recent travel history available.



documented as of this encounter



Miscellaneous Notes

* Telephone Encounter - Cydney New RN - 2017  4:33 PM CST



37 taught while in office and hard copy of instructions given.  , Sundeep manzo was not able to give a date/time/location before leaving. She is aware that
she will be contacted with this information. Note that blanks will need to be f
illed once information obtained:



Pt scheduled for [facility] __________, scheduled__________, to arrive at ________ orde
red by RUTHIE. 

Verified allergies, reviewed medicatons. E[facility]IL SENT TO [facility] PATIENT IS ANAPHLAXIS TO ALL MIRANDA

Provided following instructions: NPO after MN prior to procedure. 

Please hold the following medications: LASIX THE MORNING OF PROCEDURE, take all 
other medications as you normally would, though only with small sips of water.  

You will not be allowed to drive yourself home, so please make arrangements for 
a ride.  Your  is asked to remain in the waiting area until your procedure
has been completed.  

You should NOT drive or operate machinery for at least 12-14 hours following the
procedure, as there may be residual effects from the sedative. 

Following the procedure, avoid food and beverages, especially hot liquids, until
sensation has returned to the throat.  This usually occurs within an hour. 

Please contact [phone] nurse line with further questions. 

Last office visit 3/7/2016 

Orders entered in EPIC___________________ 

 Updated procedure log_______________________. 

Mailed instructions to patient --GIVEN TO PATIENT AT TIME OF VISIT 3/7/17.







Electronically signed by Cydney New RN at 2017  4:38 PM CST

documented in this encounter



Plan of Treatment





Not on filedocumented as of this encounter



Visit Diagnoses

Not on filedocumented in this encounter

## 2020-03-13 NOTE — XMS REPORT
Encounter Summary

                             Created on: 2020



Alvina Bansal

External Reference #: [id number]

: 1935

Sex: Female



Demographics





                          Address                   [address] [place]  [address]

 

                          Home Phone                [phone]

 

                          Preferred Language        English

 

                          Marital Status            

 

                          Gnosticist Affiliation     [id number]

 

                          Race                      White

 

                          Ethnic Group              Not  or 





Author





                          Author                    [facility]

 

                          Organization              [facility]

 

                          Address                   Unknown

 

                          Phone                     Unavailable







Support





                Name            Relationship    Address         Phone

 

                Krista Honeycutt   [employer]            Unknown         [phone]







Care Team Providers





                    Care Team Member Name Role                Phone

 

                    Payam Hutchison   PCP                 [phone]







Reason for Referral

* MRI/CAT/PET Scan (Routine)



                          Referred By Contact       Referred To Contact



                 Status          Reason          Specialty       Diagnoses /  



                                         Procedures  

 

                                        



Mariam Nguyen MD



[address] [facility] 



[place] [address]



Phone: [phone]



Fax: [phone]                       



[facility]



[address] [place] [address]



Phone: [phone]



Fax: [phone]



                     Closed              Radiology           Diagnoses  



                                         Chronic diastolic  



                                         congestive heart  



                                         failure (HCC)  



                                         Mitral valve  



                                         insufficiency,  



                                         unspecified  



                                         etiology

  



                                         P  



                                         rocedures  



                                         CV MRI Cardiac w  



                                         wo contrast  







* Electrophysiology (Routine)



                          Referred By Contact       Referred To Contact



                 Status          Reason          Specialty       Diagnoses /  



                                         Procedures  

 

                                        



Mariam Nguyen MD



[address] [facility] 



[place] [address]



Phone: [phone]



Fax: [phone]                       







                           Closed                    Diagnoses  



                                         Bradycardia

  



                                         P  



                                         rocedures  



                                         Cardiac Monitoring  



                                         Patch  







* Diagnostic Imaging (Routine)



                          Referred By Contact       Referred To Contact



                 Status          Reason          Specialty       Diagnoses /  



                                         Procedures  

 

                                        



Mariam Nguyen MD



[address] [facility] 



[place] [address]



Phone: [phone]



Fax: [phone]                       







                           Closed                    Diagnoses  



                                         Chronic diastolic  



                                         congestive heart  



                                         failure (HCC)  



                                         Mitral valve  



                                         insufficiency,  



                                         unspecified  



                                         etiology

  



                                         P  



                                         rocedures  



                                         Electrocardiogram  



                                         (ECG)  











Reason for Visit

* 



 



                           Reason                    Comments

 

 



                                         Congestive Heart Failure 









Encounter Details





                          Care Team                 Description



                     Date                Type                Department  

 

                                        



Mariam Nguyen MD



[address] [facility] 



[place] [address]



[phone] (Fax)                      Chronic diastolic congestive heart failu

re (HCC) (Primary 

Dx); 

Mitral valve insufficiency, unspecified etiology; 

Bradycardia; 

Laboratory exam ordered as part of routine general medical examination; 

Exertional dyspnea; 

Pulmonary hypertension (HCC); 

Fatigue, unspecified type



                     2019          Office Visit        [facility]  



                                         [facility]  



                                         [facility]  



                                         [address]  



                                         [address]  



                                         [place] [address]  



                                         [phone]  







Social History





                                        Date



                 Tobacco Use     Types           Packs/Day       Years Used 

 

                                         



                                         Never Smoker    

 

    



                                         Smokeless Tobacco: Never   



                                         Used   







                    Drinks/Week         oz/Week             Comments



                                         Alcohol Use   

 

                                                             



                                         No   







 



                           Sex Assigned at Birth     Date Recorded

 

 



                                         Not on file 







                                        Industry



                           Job Start Date            Occupation 

 

                                        Not on file



                           Not on file               Not on file 







                                        Travel End



                           Travel History            Travel Start 

 





                                         No recent travel history available.



documented as of this encounter



Last Filed Vital Signs





                    Reading             Time Taken          Comments



                                         Vital Sign   

 

                    113/59              2019  1:41 PM CDT lowest



                                         Blood Pressure   

 

                    80                  2019  1:40 PM CDT  



                                         Pulse   

 

                    -                   -                    



                                         Temperature   

 

                    -                   -                    



                                         Respiratory Rate   

 

                    -                   -                    



                                         Oxygen Saturation   

 

                    -                   -                    



                                         Inhaled Oxygen   



                                         Concentration   

 

                    61.7 kg (136 lb)    2019  1:40 PM CDT  



                                         Weight   

 

                    157.5 cm (5' 2")    2019  1:40 PM CDT  



                                         Height   

 

                    24.87               2019  1:40 PM CDT  



                                         Body Mass Index   



documented in this encounter



Patient Instructions

* Patient Instructions* 



 Aleja Lei - 2019  1:45 PM CDT



Your doctor has ordered the following test(s): CBC, CMP, lipid panel, TSH. These
are blood tests. Please have this done today in [facility], [address]



Zio patch. We will attempt to have this done today



CV MRI



We will attempt to assist you with scheduling your testing. Depending on your in
surance, a pre-authorization may be required to schedule your testing. If this i
s the case, you will receive a call to schedule after your insurance approval ha
s been received.



You will receive a call 5-7 days after testing has been completed to discuss the
results. 



Medication Instructions: No changes



Additional Instructions: None



Your doctor has placed a referral to: None 



Follow-up: Depending on results of testing

 

Please call the Nurse Line at [phone] with any questions or concerns. For
medication refill needs, please first contact your pharmacy. 



For any [facility] scheduling questions, please andreia carbajal [phone]. 



At [facility], high quality patient care is our top priority. To ensure our 
cardiovascular standards are continuously met, you may receive a survey via emai
l or text message, and we ask that you please take the time to fill it out. We s
trive to ensure you are very satisfied with every visit.  Thank you in advance f
or taking the time to fill this out. 



It was a pleasure to see you today



SALLY Masterson





Electronically signed by Aleja Lei at 2019  2:31 PM CDT





documented in this encounter



Progress Notes

* Mariam Nguyen MD - 2019  1:45 PM CDT



Formatting of this note might be different from the original.

[facility]



Appointment Date: 2019



Payam Hutchison DO

[address] [place] [id number]



RE:  Alvina Bansal

:   1935  

Visit provider:  Mariam Nguyen MD



Dear Payam Hutchison DO:



I had the pleasure of seeing Alvina Bansal in the office today. She is a(n) 83-year-
old female and presents with the following chief complaint(s):  Congestive heart
failure.



HPI:

Ms. Bansal is an 83-year-old female whom we have followed with a history of mitr
al regurgitation.  At the time of our last visit, severe mitral regurgitation vi
a echocardiogram was noted.  Recommendations were made for a SIXTO, though due to 
an epiglottis pathology, it was felt SIXTO was contraindicated.  Recommendations a
t that time were for a cardiac MRI to further evaluate her MR.  This was deferre
d by her at that time.



Today, we saw her with new symptoms of profound fatigue.  She feels like she is 
melting.  She does spot check her heart rate and states at times her monitor tel
ls her it is 20 beats per minute.  Other than feeling fatigued, she does not not
e presyncope or syncope.  Her only medication is of that related to her thyroid 
medicine.  Her weight has been stable.  She does have dyspnea on exertion.  She 
denies dyspnea at rest, orthopnea, PND, syncope, or symptoms of stroke.



Patient Active Problem List 

Diagnosis SNOMED CT(R) 

 Hypothyroidism HYPOTHYROIDISM 

 Fatigue FATIGUE 

 Exertional dyspnea DYSPNEA ON EXERTION 

 Pulmonary hypertension (HCC) PULMONARY HYPERTENSION 

 Multiple sclerosis (HCC) MULTIPLE SCLEROSIS 

 Mitral regurgitation MITRAL VALVE REGURGITATION 

 Family history of coronary artery disease FAMILY HISTORY OF CORONARY ARTERIO
SCLEROSIS 

 Chronic diastolic congestive heart failure (HCC) CHRONIC DIASTOLIC HEART MYCHAL
LURE 



Past Medical History: 

Diagnosis Date 

 Chest pain  

 Chronic diastolic congestive heart failure (HCC)  

 Edema  

 Lower ext 

 Exertional dyspnea  

 Family history of coronary artery disease  

 H. pylori ulcer  

 Hypothyroidism  

 Left bundle branch block (LBBB)  

 Mitral regurgitation  

 Mitral valve prolapse 3/13/2017 

 Multiple sclerosis (HCC)  

 Myocardial infarction (HCC)  

 Type 2 

 Nocturnal hypoxemia  

 Osteoporosis  

 Positive JACOB (antinuclear antibody)  

 Pulmonary hypertension (HCC) 2015 

 Moderate per Echo 

 Thyroid nodule  

 Tricuspid regurgitation  



Past Surgical History: 

Procedure Laterality Date 

 APPENDECTOMY   

 CARDIAC CATHETERIZATION  2016 

 No angiographic evidence of coronary artery disease ([facility]) 

 FIBULA FRACTURE SURGERY   

 HYSTERECTOMY   

 RIGHT HEART CATH  2016 

 RA:  7.  RV:  41/9.  PA:  38/11/20.  Wedge:  15 with V-waves to 25.  LV:  143/1
3, Ascending aortic pressure:  145/61.  CO/CI (Sanna):  3.6/2.1.  LVEF 55%. ([facility]) 

 TONSILLECTOMY AND ADENOIDECTOMY   



Final Medications:

Current Outpatient Prescriptions 

Medication Sig Dispense Refill 

 thyroid, pork, (ARMOUR) 60 mg Tab tablet Take 90 mg by mouth daily.    



No current facility-administered medications for this visit.  



Allergies 

Allergen Reactions 

 Edgard-1 Anaphylaxis 

  All MIRANDA--lidocaine, Novocain............. 

 Lasix [Furosemide] Anaphylaxis 

 Naltrexone Analogues  



Family History 

Problem Relation Age of Onset 

 Heart attack Father  

 Kidney cancer Brother  

 Melanoma Brother  



Social History:

Social History 

Substance Use Topics 

 Smoking status: Never Smoker 

 Smokeless tobacco: Never Used 

 Alcohol use No 



Review of Systems 

Constitution: Positive for malaise/fatigue. Negative for fever and night sweats.
 

HENT: Negative for nosebleeds.  

Cardiovascular: Positive for leg swelling. Negative for chest pain, claudication
, cyanosis, dyspnea on exertion, irregular heartbeat, near-syncope, orthopnea, p
alpitations, paroxysmal nocturnal dyspnea and syncope. 

Respiratory: Negative for cough, hemoptysis, shortness of breath, sleep disturba
nces due to breathing, snoring and wheezing.  

Endocrine: Positive for cold intolerance. Negative for polydipsia. 

Hematologic/Lymphatic: Does not bruise/bleed easily. 

Skin: Negative for rash. 

Musculoskeletal: Negative for joint pain and myalgias. 

Gastrointestinal: Negative for dysphagia, hematochezia, nausea and vomiting. 

Genitourinary: Negative for hematuria. 

Neurological: Negative for brief paralysis, disturbances in coordination, excess
sarah daytime sleepiness, dizziness, focal weakness, light-headedness, loss of bal
ance, numbness and paresthesias. 

All other systems reviewed and are negative.



Vital Signs 

 19 1340 19 1341 

BP: 117/56 113/59 

Pulse: 80  

Weight: 61.7 kg (136 lb)  

Height: 1.575 m (5' 2")  

 

BMI: Body mass index is 24.87 kg/m.



Physical Exam 

Constitutional: She appears well-developed. No distress. 

HENT: 

Head: Normocephalic and atraumatic. 

Eyes: No scleral icterus. 

Neck: Normal range of motion. Neck supple. No JVD present. 

Cardiovascular: Normal rate.  

Murmur heard.

Pulmonary/Chest: Effort normal and breath sounds normal. 

Abdominal: Soft. 

Musculoskeletal: Normal range of motion. She exhibits no edema. 

Neurological: She is alert. No cranial nerve deficit. 

Skin: Skin is warm. 

Psychiatric: She has a normal mood and affect. 

Vitals reviewed.



Cholesterol (no units) 

Date Value 

2019 200 

2016 158 



HDL Cholesterol (mg/dL) 

Date Value 

2019 50 



Triglycerides (mg/dL) 

Date Value 

2019 177 (A) 

2016 80 



LDL Cholesterol 

Date/Time Value Ref Range Status 

2019 02:44  mg/dL Final 

2016 102 mg/dL Final 



EKG:  Sinus rhythm, rate 80, with left bundle morphology.  She has a known left 
bundle branch block.



Encounter Diagnoses 

Name Primary? 

 Chronic diastolic congestive heart failure (HCC) Yes 

 Mitral valve insufficiency, unspecified etiology  

 Bradycardia  

 Laboratory exam ordered as part of routine general medical examination  

 Exertional dyspnea  

 Pulmonary hypertension (HCC)  

 Fatigue, unspecified type  



Impression:

1.  Profound fatigue.  I am not convinced this is of primary cardiac etiology.  
She does self-report a heart rate of 20 beats per minute, though I do feel she i
s not detecting or the monitor is not reporting accurately.  One could consider 
her mitral regurgitation as a source, though her symptoms of dyspnea have not ne
cessarily changed, though she is limited by dyspnea on exertion.

2.  Mitral regurgitation.

3.  Diastolic dysfunction.  She did self-discontinue spironolactone as she felt 
it was contributing to her bradycardia.

4.  Remote history of type 2 myocardial infarction with normal coronary arteries
 per angiogram.



Plan:

1.  I did review risk factors and updates on preventive strategies.

2.  I have suggested laboratory to include CBC, CMP, TSH, and lipid profile to a
ssist in evaluation of profound fatigue.

3.  Zio Patch.  My clinical suspicion that her heart rate at times has been 20 b
eats per minute is low.  However, in the setting of profound fatigue, I do want 
to exclude symptomatic bradycardia.

4.  Cardiac MRI to further evaluate MR.

5.  I will review the results of the above with her with further recommendations
.



Treatment goals, progress, and next steps, as above, were discussed and mutually
 agreed upon with the patient/family.



Thank you for allowing me to participate in Alvina Bansal's care.  If I can be of an
y further assistance, please do not hesitate to contact me.



Sincerely,



Mariam Nguyen MD



/matthew



Electronically signed by Mariam Nguyen MD at 2019 11:49 AM CDT

documented in this encounter



Plan of Treatment





                                        Order Schedule



                 Name            Type            Priority        Associated Diag

noses 

 

                                        1 Occurrences starting 2019 until 

2020



                 Cardiac Monitoring Patch  Cardiac         Routine         Deandre

cardia 



                                         Services   

 

                                        1 Occurrences starting 2019 until 

2020



                 CV MRI Cardiac w wo  Cardiac         Routine         Chronic di

astolic 



                     contrast            Services            congestive heart fa

ilure 



                                         (HCC) 



                                         Mitral valve 



                                         insufficiency, 



                                         unspecified etiology 



documented as of this encounter



Procedures





                                        Comments



                 Procedure Name  Priority        Date/Time       Associated Diag

nosis 

 

                                        



Results for this procedure are in the results section.



                 POCT LIPID PANEL  Routine         2019      Chronic diast

olic 



                           2:44 PM CDT               congestive heart failure 



                                         (HCC) 

 

                                        



Results for this procedure are in the results section.



                 ECG             Routine         2019      Chronic diastol

ic 



                           1:37 PM CDT               congestive heart failure 



                                         (HCC) 



                                         Mitral valve 



                                         insufficiency, 



                                         unspecified etiology 



documented in this encounter



Results

* Thyroid Stimulating Hormone (2019  3:06 PM CDT)



    



              Component    Value        Ref Range    Performed At  Pathologist



                                         Signature

 

    



                 Thyroid         0.15 (L)        0.47 - 4.68 uIU/mL  [facility]

[facility] 



                           Stim[facility]               [facility] 



                           Hormone                   [facility] 











                                         Specimen

 





                                         Blood







   



                 Performing Organization  Address         City[facility]  Ph

one Number

 

   



                 [facility]  [address] [place] [address]

  [phone]



                                         LAB[facility]ATORIES   





* Comprehensive Metabolic Panel (2019  3:06 PM CDT)



    



              Component    Value        Ref Range    Performed At  Pathologist



                                         [facility]

 

    



                 Sodium          138             133 - 147 MEQ/L  [facility] 

 

    



                 Potassium       4.6             3.5 - 5.3 MEQ/L  [facility] 

 

    



                 Chloride        103             96 - 112 MEQ/L  [facility] 

 

    



                 Carbon Dioxide  30              20 - 32 MEQ/L   [facility] 

 

    



                 Anion Gap       6               5 - 17          [facility] 

 

    



                 Calcium         9.3             8.4 - 10.5 mg/dL  [facility] 

 

    



                 Glucose         88              70 - 100 mg/dL  [facility] 

 

    



                 Protein Total   6.5             6.0 - 8.2 g/dL  [facility] 



                           Serum                     [facility] 



                                         [facility] 

 

    



                 Albumin         3.7             3.5 - 5.0 g/dL  [facility] 

 

    



                 Alkaline        85              42 - 140 IU/L   [facility] 



                           Phosphatase               [facility] 



                                         [facility] 

 

    



                 Alanine         25              0 - 34 IU/L     [facility] 



                           Aminotransferas           [facility] 



                           e                         LAB[facility]ATORIES 

 

    



                 Aspartate       31              15 - 46 IU/L    [facility] 



                           [employer]otransfer[employer]           [facility] 



                           e                         LABORATORIES 

 

    



                 Bilirubin Total  0.4             0.2 - 1.3 mg/dL  [facility] 

 

    



                 Blood Urea      19              7 - 26 mg/dL    [facility] 



                           Nitrogen                  [facility] 



                                         [facility] 

 

    



                 Creatinine      0.7             0.4 - 1.1 mg/dL  [facility] 



                                         [facility] 

 

    



                 eGFR Female AA  96              60 - 200        [facility] 



                           mL/min/1.73sq m           [facility] 



                                         [facility] 

 

    



                 eGFR Female     80              60 - 200        [facility] 



                     Non-AA              mL/min/1.73sq m     [facility] 



                                         [facility] 











                                         Specimen

 





                                         Blood







   



                 Performing Organization  Address         City/[facility]  Ph

one Number

 

   



                 [facility]  [address] [place] [address]

  [phone]



                                         LABORATORIES   





* CBC and Diff (manual diff if necessary) (2019  3:06 PM CDT)



    



              Component    Value        Ref Range    Performed At  Pathologist



                                         Signature

 

    



                 WBC             6.66            4.00 - 11.00 TH/uL  [facility] 



                                         [facility] 

 

    



                 RBC             4.18            4.00 - 5.00 MIL/uL  [facility] 

 

    



                 Hemoglobin      12.9            12.0 - 15.0 g/dL  [facility] [facility] 

 

    



                 Hematocrit      39              36 - 45 %       [facility] 

 

    



                 MCV             93              80 - 99 fL      [facility] 

 

    



                 MCH             31              27 - 34 pg      [facility] 

 

    



                 MCHC            33              32 - 36 %       [facility] 

 

    



                 RDW             13.3            11.5 - 14.5 %   [facility] 

 

    



                 Platelet Count  190             140 - 400 TH/uL  [facility] 

 

    



                 MPV             12.2            9.4 - 12.3 fL   [facility] 

 

    



                 Nucleated RBCs  0               0 - 0 /100      [facility] 

 

    



                 % Neutrophils   68              45 - 78 %       [facility] 

 

    



                 %Lymphocytes    17              15 - 47 %       [facility] 

 

    



                 %Monocytes      11              0 - 12 %        [facility] 

 

    



                 %Eosinophils    2               0 - 7 %         [facility] 

 

    



                 %Basophils      1               0 - 2 %         [facility] 

 

    



                 % Imm Grans     0               0 - 1 %         [facility] 

 

    



                 # Granulocytes  4.57            1.70 - 6.80 TH/uL  [facility] 

 

    



                 # Lymphocytes   1.13            1.00 - 3.30 TH/uL  [facility] 

 

    



                 # Monocytes     0.76            0.20 - 0.90 TH/uL  [facility] 

 

    



                 # Eosinophils   0.16            0.00 - 0.40 TH/uL  [facility] 

 

    



                 # Basophils     0.04            0.00 - 0.10 TH/uL  [facility] 











                                         Specimen

 





                                         Blood







   



                 Performing Organization  Address         City/State/Zipcode  Ph

one Number

 

   



                 [facility] [address] [place] [address]

  [phone]



                                         [facility]ATORIES   





* POCT Lipid Panel (2019  2:44 PM CDT)



    



              Component    Value        Ref Range    Performed At  Pathologist



                                         Signature

 

    



                           Cholesterol               200   

 

    



                     HDL Cholesterol     50                  35 - 70 mg/dL  

 

    



                     Triglycerides       177 (A)             40 - 160 mg/dL  

 

    



                     LDL Cholesterol     115                 mg/dL  

 

    



                           Non-HDL                   150   



                                         Cholesterol    

 

    



                           Cholesterol/HDL           4.0   



                                         Ratio    

 

    



                     Glucose             89                  mg/dL  











                                         Specimen

 





                                         Blood







 



                           Narrative                 Performed At

 

 



                                         This result has an attachment that is n

ot available. 





* Electrocardiogram (ECG) (2019  1:37 PM CDT)



    



              Component    Value        Ref Range    Performed At  Pathologist



                                         Signature

 

    



                     QRSd                118                 [facility] 

 

    



                     QT                  416                 [facility] 

 

    



                     QTC                 477                 [facility] 

 

    



                     ECGHR               79                  [facility] 

 

    



                     ECGPR               184                 [facility] 











                                         Specimen

 









 



                           Narrative                 Performed At

 

 



                           This result has an attachment that is not available. 

 [facility]



                                                      

   [facility] 



                                                      

      



                                                      

      Test Date:  



                                         2019 



                                         Pat Name:   ALVINA BANSAL     

   Department:  [facility] 



                                         Patient ID:  [id number]      

   Room:     



                                         Gender:    Female      

    Technician:  DMITRI 



                                         :     1935    

    Requested By: MARIAM NGUYEN 



                                         Order Number: [id number]      

  Reading MD:  Mariam Nguyen 



                                                      

   Measurements 



                                         Intervals          

     Axis      



                                         Rate:     79       

     P:      45 



                                         OR:      184      

     QRS:     -10 



                                         QRSD:     118      

     T:      87 



                                         QT:      416      

             



                                         QTc:     477      

             



                                                      

Interpretive Statements 



                                         SINUS RHYTHM 



                                         INCOMPLETE LEFT BUNDLE BRANCH BLOCK 



                                         Electronically Signed On 2019 22:47

:51 CDT by Mariam Nguyen 







                                        Procedure Note

 

                                        



Interface, External Ris In 2019 10:48 PM CDT



                                 [facility]

                                       

                                       Test Date:    2019

Pat Name:     ALVINA MARTINJAXON                Department:   [facility]

Patient ID:   [id number]                 Room:         

Gender:       Female                   Technician:   DMITRI

:          1935               Requested By: MARIAM NGUYEN 

Order Number: [id number]                Reading MD:   Mariam Nguyen

                                 Measurements

Intervals                              Axis          

Rate:         79                       P:            45

OR:           184                      QRS:          -10

QRSD:         118                      T:            87

QT:           416                                    

QTc:          477                                    

                           Interpretive Statements

SINUS RHYTHM

INCOMPLETE LEFT BUNDLE BRANCH BLOCK

Electronically Signed On 2019 22:47:51 CDT by Mariam Nguyen







   



                 Performing Organization  Address         City/State/Zipcode  Ph

one Number

 

   



                                         [facility]   





documented in this encounter



Visit Diagnoses









                                         Diagnosis

 





                                         Chronic diastolic congestive heart fail

ure (HCC)

 





                                         Mitral valve insufficiency, unspecified

 etiology

 





                                         Bradycardia



                                         Other specified cardiac dysrhythmias

 





                                         Laboratory exam ordered as part of rout

ine general medical examination



                                         Laboratory examination ordered as part 

of a routine general medical examination

 





                                         Exertional dyspnea



                                         Other dyspnea and respiratory abnormali

ty

 





                                         Pulmonary hypertension (HCC)



                                         Other chronic pulmonary heart diseases

 





                                         Fatigue, unspecified type



documented in this encounter

## 2020-03-13 NOTE — XMS REPORT
Encounter Summary

                             Created on: 2020



Alvina Charles

External Reference #: [id number]

: 1935

Sex: Female



Demographics





                          Address                   [address] [place]  [address]

 

                          Home Phone                [phone]

 

                          Preferred Language        English

 

                          Marital Status            

 

                          Confucianism Affiliation     1013

 

                          Race                      White

 

                          Ethnic Group              Not  or 





Author





                          Author                    [facility]

 

                          Organization              [facility]

 

                          Address                   Unknown

 

                          Phone                     Unavailable







Support





                Name            Relationship    Address         Phone

 

                Krista Honeycutt   [employer]            Unknown         [phone]







Care Team Providers





                    Care Team Member Name Role                Phone

 

                    Hutchison Payam   PCP                 [phone]







Encounter Details





                          Care Team                 Description



                     Date                Type                Department  

 

                                        



Tala Mullins RN                     



                     2019          Telephone           [facility]  



                                         [facility]  



                                         [facility]  



                                         [address]  



                                         [address]  



                                         [place] [address]  



                                         [phone]  







Social History





                                        Date



                 Tobacco Use     Types           Packs/Day       Years Used 

 

                                         



                                         Never Smoker    

 

    



                                         Smokeless Tobacco: Never   



                                         Used   







                    Drinks/Week         oz/Week             Comments



                                         Alcohol Use   

 

                                                             



                                         No   







 



                           Sex Assigned at Birth     Date Recorded

 

 



                                         Not on file 







                                        Industry



                           Job Start Date            Occupation 

 

                                        Not on file



                           Not on file               Not on file 







                                        Travel End



                           Travel History            Travel Start 

 





                                         No recent travel history available.



documented as of this encounter



Miscellaneous Notes

* Telephone Encounter - Cydney Mullins RN - 2019 11:25 AM CDT



I called and spoke w/ arsenio Kolb's dghtr. I let her know Dr. Nguyen was updated r
egarding the LBBB seen at the PCP office. I let her know we have not received th
e ECG from the PCP office, but Dr. Nguyen reports the LBBB is known, and if pt 
is having concerning symptoms we could always schedule her for a revaluation. Darin castelan v/u. Kennedi reports pt's main concern is extreme fatigue, which is why she saw 
her PCP. 

I let Kennedi know we would request the ECG from the PCP office and ask Dr. Nguyen
to review. Kennedi v/u. 



Electronically signed by Cydney Mullins RN at 2019 11:31 AM CDT

* Telephone Encounter - Mariam Nguyen MD - 2019  1:36 PM CDT



She has known LBBB

o extra eval



Electronically signed by Mariam Nguyen MD at 2019  1:36 PM CDT

* Telephone Encounter - Tala Mullins RN - 2019 12:39 PM CDT



Live call from pt's daughter stating she was at her PCP's office yesterday and t
hey got a call this am stating pt had LBBB show up on her ECG.

In review of pt chart I state there was an ECG done in our system 19 that sh
owed an incomplete LBBB, so this is not a totally new finding. I ask if pt has f
elt any abnormal heart beats/palpitations, CP, SOA, etc. and she states no, in g
eneral she says pt has felt the same as when Dr. Nguyen saw her on 19.

Stated I will call PCP's office to request the ECG they did be sent to us via fa
x attn Dr. Nguyen, and will send Dr. Nguyen a message asking her to advise if 
she has any recs in the interim. Was only able to LVM at PCP Dr. Franks's offic
e requesting ECG and routing to Dr. Nguyen. 



Electronically signed by Tala Mullins RN at 2019 12:46 PM CDT

documented in this encounter



Plan of Treatment





Not on filedocumented as of this encounter



Visit Diagnoses

Not on filedocumented in this encounter

## 2020-03-13 NOTE — XMS REPORT
Encounter Summary

                             Created on: 2020



Alvina Charles

External Reference #: [id number]

: 1935

Sex: Female



Demographics





                          Address                   [address] [place]  [address]

 

                          Home Phone                [phone]

 

                          Preferred Language        English

 

                          Marital Status            

 

                          Evangelical Affiliation     1013

 

                          Race                      White

 

                          Ethnic Group              Not  or 





Author





                          Author                    [facility]

 

                          Organization              [facility]

 

                          Address                   Unknown

 

                          Phone                     Unavailable







Support





                Name            Relationship    Address         Phone

 

                Krista Honeycutt   [employer]            Unknown         [phone]







Care Team Providers





                    Care Team Member Name Role                Phone

 

                    Payam Hutchison   PCP                 [phone]







Encounter Details





                          Care Team                 Description



                     Date                Type                Department  

 

                                        



Adolfo Murdock RN                     



                     2016          Abstract            [facility]  



                                         [facility]  



                                         [facility]  



                                         [address]  



                                         [address]  



                                         [place] [address]  



                                         [phone]  







Social History





                                        Date



                 Tobacco Use     Types           Packs/Day       Years Used 

 

                                         



                                         Never Assessed    







 



                           Sex Assigned at Birth     Date Recorded

 

 



                                         Not on file 







                                        Industry



                           Job Start Date            Occupation 

 

                                        Not on file



                           Not on file               Not on file 







                                        Travel End



                           Travel History            Travel Start 

 





                                         No recent travel history available.



documented as of this encounter



Plan of Treatment





Not on filedocumented as of this encounter



Visit Diagnoses

Not on filedocumented in this encounter

## 2020-03-13 NOTE — XMS REPORT
Encounter Summary

                             Created on: 2020



Alvina Charles

External Reference #: [id number]

: 1935

Sex: Female



Demographics





                          Address                   [address] [place]  [address]

 

                          Home Phone                [phone]

 

                          Preferred Language        English

 

                          Marital Status            

 

                          Pentecostalism Affiliation     1013

 

                          Race                      White

 

                          Ethnic Group              Not  or 





Author





                          Author                    [facility]

 

                          Organization              [facility]

 

                          Address                   Unknown

 

                          Phone                     Unavailable







Support





                Name            Relationship    Address         Phone

 

                Krista Honeycutt   [employer]            Unknown         [phone]







Care Team Providers





                    Care Team Member Name Role                Phone

 

                    Payam Hutchison   PCP                 [phone]







Encounter Details





                          Care Team                 Description



                     Date                Type                Department  

 

                                        



Mariam Nguyen MD



[address]



[address]



[place] [address]



[phone] (Fax)                       



                     2019          Transcribe          [facility]

al  



                           Orders                    [address]  



                                         [address]  



                                         [place] [address]  



                                         [phone]  







Social History





                                        Date



                 Tobacco Use     Types           Packs/Day       Years Used 

 

                                         



                                         Never Smoker    

 

    



                                         Smokeless Tobacco: Never   



                                         Used   







                    Drinks/Week         oz/Week             Comments



                                         Alcohol Use   

 

                                                             



                                         No   







 



                           Sex Assigned at Birth     Date Recorded

 

 



                                         Not on file 







                                        Industry



                           Job Start Date            Occupation 

 

                                        Not on file



                           Not on file               Not on file 







                                        Travel End



                           Travel History            Travel Start 

 





                                         No recent travel history available.



documented as of this encounter



Plan of Treatment





Not on filedocumented as of this encounter



Visit Diagnoses

Not on filedocumented in this encounter

## 2020-03-13 NOTE — XMS REPORT
Encounter Summary

                             Created on: 2020



Alvina Charles

External Reference #: [id number]

: 1935

Sex: Female



Demographics





                          Address                   [address] [place]  [address]

 

                          Home Phone                [phone]

 

                          Preferred Language        English

 

                          Marital Status            

 

                          Judaism Affiliation     [id number]

 

                          Race                      White

 

                          Ethnic Group              Not  or 





Author





                          Author                    [facility]

 

                          Organization              [facility]

 

                          Address                   Unknown

 

                          Phone                     Unavailable







Support





                Name            Relationship    Address         Phone

 

                Krista Honeycutt   [employer]            Unknown         [phone]







Care Team Providers





                    Care Team Member Name Role                Phone

 

                    Payam Hutchison   PCP                 [phone]







Reason for Visit

* 



 



                           Reason                    Comments

 

 



                                         [facility] 









Encounter Details





                          Care Team                 Description



                     Date                Type                Department  

 

                                        



Deann Ahumada, RN                    [facility]



                     2017          Telephone           [facility]  



                                         [facility]  



                                         [facility]  



                                         [address]  



                                         [address]  



                                         [place] [address]  



                                         [phone]  







Social History





                                        Date



                 Tobacco Use     Types           Packs/Day       Years Used 

 

                                         



                                         Never Smoker    

 

    



                                         Smokeless Tobacco: Never   



                                         Used   







                    Drinks/Week         oz/Week             Comments



                                         Alcohol Use   

 

                                                             



                                         No   







 



                           Sex Assigned at Birth     Date Recorded

 

 



                                         Not on file 







                                        Industry



                           Job Start Date            Occupation 

 

                                        Not on file



                           Not on file               Not on file 







                                        Travel End



                           Travel History            Travel Start 

 





                                         No recent travel history available.



documented as of this encounter



Miscellaneous Notes

* Telephone Encounter - Hannah Mccormack RN - 2017  3:06 PM CDT



CD of Echo arrived today.  Given to [facility] for review.  



Electronically signed by Hannah Mccormack RN at 2017  3:06 PM CDT

* Telephone Encounter - Ronda Foreman - 2017 11:26 AM CDT



 pt is scheduled with [facility] on 



Electronically signed by Ronda Foreman at 2017 11:26 AM CDT

* Telephone Encounter - Deann Ahumada RN - 2017  4:44 PM CDT



3-23-17 - pt called to report that she had her Echo completed at [facility] on 3-17-17 and wanted to make sure we were able to get the CD. Called and 
notified pt we would request. Also she asked to get follow up appt with [facility]. Malick carbajal route message to [facility] and schedulers. 



Electronically signed by Deann Ahumada RN at 2017  4:46 PM CDT

documented in this encounter



Plan of Treatment





Not on filedocumented as of this encounter



Visit Diagnoses

Not on filedocumented in this encounter

## 2020-03-13 NOTE — XMS REPORT
Encounter Summary

                             Created on: 2020



Alvina Charles

External Reference #: [id number]

: 1935

Sex: Female



Demographics





                          Address                   [address] [place]  [address]

 

                          Home Phone                [phone]

 

                          Preferred Language        English

 

                          Marital Status            

 

                          Protestant Affiliation     [id number]

 

                          Race                      White

 

                          Ethnic Group              Not  or 





Author





                          Author                    [facility]

 

                          Organization              [facility]

 

                          Address                   Unknown

 

                          Phone                     Unavailable







Support





                Name            Relationship    Address         Phone

 

                Krista Honeycutt   [employer]            Unknown         [phone]







Care Team Providers





                    Care Team Member Name Role                Phone

 

                    Payam Hutchison   PCP                 [phone]







Encounter Details





                          Care Team                 Description



                     Date                Type                Department  

 

                                        



Sammie Arellano MD



[address]



[address]



[place] [address] [phone] (Fax)                       



                     2017          Documentation       [facility]  



                                         [facility]  



                                         [facility]  



                                         [address]  



                                         [address]  



                                         [place] [id number] [phone]  







Social History





                                        Date



                 Tobacco Use     Types           Packs/Day       Years Used 

 

                                         



                                         Never Smoker    

 

    



                                         Smokeless Tobacco: Never   



                                         Used   







                    Drinks/Week         oz/Week             Comments



                                         Alcohol Use   

 

                                                             



                                         No   







 



                           Sex Assigned at Birth     Date Recorded

 

 



                                         Not on file 







                                        Industry



                           Job Start Date            Occupation 

 

                                        Not on file



                           Not on file               Not on file 







                                        Travel End



                           Travel History            Travel Start 

 





                                         No recent travel history available.



documented as of this encounter



Plan of Treatment





                                        Order Schedule



                 Name            Type            Priority        Associated Diag

noses 

 

                                        Ordered: 2017



                           PULMONARY FUNCTION TEST   PFT   



documented as of this encounter



Procedures





                                        Comments



                 Procedure Name  Priority        Date/Time       Associated Diag

nosis 

 

                                        



Results for this procedure are in the results section.



                     ECHO OUTSIDE RECORD  Routine             2017  

 

                                        



Results for this procedure are in the results section.



                           LAB SUMMARY               2017  



                                         12:59 PM CST  

 

                                        



Results for this procedure are in the results section.



                           LAB SUMMARY               2017  



                                         3:28 PM CST  

 

                                        



Results for this procedure are in the results section.



                           LAB SUMMARY               2017  



                                         3:28 PM CST  

 

                                        



Results for this procedure are in the results section.



                           LAB SUMMARY               2017  



                                         3:28 PM CST  

 

                                        



Results for this procedure are in the results section.



                           LAB SUMMARY               2017  



                                         3:28 PM CST  

 

                                        



Results for this procedure are in the results section.



                           LAB SUMMARY               2017  



                                         3:27 PM CST  

 

                                        



Results for this procedure are in the results section.



                     GLUCOSE             Routine             2016  

 

                                         



                     CT OUTSIDE RECORD   Routine             2016  

 

                                        



Results for this procedure are in the results section.



                     CORONARY ANGIOGRAM  Routine             2016  



                                         OUTSIDE RECORD    

 

                                        



Results for this procedure are in the results section.



                     US OUTSIDE RECORD   Routine             2016  



documented in this encounter



Results

* Echo Outside Record (2017)



    



              Component    Value        Ref Range    Performed At  Pathologist



                                         Signature

 

    



                                         Ejection    



                                         Fraction    











                                         Specimen

 









 



                           Impressions               Performed At

 

 



                                         Mild mitral regurgitation and mild tric

uspid regurgitation and mild pulmonic 



                                         regurgitation. Estimated PAP normal. No

rmal cardiac chambers and cardiac 



                                         contractility. [facility] 







 



                           Narrative                 Performed At

 

 



                                         This result has an attachment that is n

ot available. 





* LAB SUMMARY (2017 12:59 PM CST)



 



                           Narrative                 Performed At

 

 



                                         This result has an attachment that is n

ot available. 



                                         Ordered by an unspecified provider. 





* LAB SUMMARY (2017  3:28 PM CST)



 



                           Narrative                 Performed At

 

 



                                         This result has an attachment that is n

ot available. 



                                         Ordered by an unspecified provider. 





* LAB SUMMARY (2017  3:28 PM CST)



 



                           Narrative                 Performed At

 

 



                                         This result has an attachment that is n

ot available. 



                                         Ordered by an unspecified provider. 





* LAB SUMMARY (2017  3:28 PM CST)



 



                           Narrative                 Performed At

 

 



                                         This result has an attachment that is n

ot available. 



                                         Ordered by an unspecified provider. 





* LAB SUMMARY (2017  3:28 PM CST)



 



                           Narrative                 Performed At

 

 



                                         This result has an attachment that is n

ot available. 



                                         Ordered by an unspecified provider. 





* LAB SUMMARY (2017  3:27 PM CST)



 



                           Narrative                 Performed At

 

 



                                         This result has an attachment that is n

ot available. 



                                         Ordered by an unspecified provider. 





* Glucose (2016)



    



              Component    Value        Ref Range    Performed At  Pathologist



                                         Signature

 

    



                     Glucose             102                 mg/dL  











                                         Specimen

 





                                         Blood







 



                           Narrative                 Performed At

 

 



                                         This result has an attachment that is n

ot available. 





* CT Outside Record (2016)







                                         Specimen

 









 



                           Narrative                 Performed At

 

 



                                         This result has an attachment that is n

ot available. 





* Coronary Angiogram Outside Record (2016)







                                         Specimen

 









 



                           Impressions               Performed At

 

 



                                         No angiographic evidence of coronary ar

roberto disease. ([facility]) 







 



                           Narrative                 Performed At

 

 



                                         This result has an attachment that is n

ot available. 





* US Outside Record (2016)







                                         Specimen

 









 



                           Impressions               Performed At

 

 



                                         No evidence for hemodynamically signifi

cant stenosis of arterial system of 



                                         either lower extremity. ([facility]) 







 



                           Narrative                 Performed At

 

 



                                         This result has an attachment that is n

ot available. 





documented in this encounter



Visit Diagnoses

Not on filedocumented in this encounter

## 2020-03-13 NOTE — XMS REPORT
Encounter Summary

                             Created on: 2020



Alvina Charles

External Reference #: [id number]

: 1935

Sex: Female



Demographics





                          Address                   [address] [place]  [address]

 

                          Home Phone                [phone]

 

                          Preferred Language        English

 

                          Marital Status            

 

                          Latter day Affiliation     [id number]

 

                          Race                      White

 

                          Ethnic Group              Not  or 





Author





                          Author                    [facility]

 

                          Organization              [facility]

 

                          Address                   Unknown

 

                          Phone                     Unavailable







Support





                Name            Relationship    Address         Phone

 

                Krista Honeycutt   [employer]            Unknown         [phone]







Care Team Providers





                    Care Team Member Name Role                Phone

 

                    Payam Hutchison   PCP                 [phone]







Encounter Details





                          Care Team                 Description



                     Date                Type                Department  

 

                                        



Mariam Nguyen MD



[address]



[address] 



[place] [address]



[phone] (Fax)                      Low TSH level; 

Hyperthyroidism



                     05/10/2019          Orders Only         [facility]  



                                         [facility]  



                                         [facility]  



                                         [address]  



                                         [address]   



                                         [place] [address]  



                                         [phone]  







Social History





                                        Date



                 Tobacco Use     Types           Packs/Day       Years Used 

 

                                         



                                         Never Smoker    

 

    



                                         Smokeless Tobacco: Never   



                                         Used   







                    Drinks/Week         oz/Week             Comments



                                         Alcohol Use   

 

                                                             



                                         No   







 



                           Sex Assigned at Birth     Date Recorded

 

 



                                         Not on file 







                                        Industry



                           Job Start Date            Occupation 

 

                                        Not on file



                           Not on file               Not on file 







                                        Travel End



                           Travel History            Travel Start 

 





                                         No recent travel history available.



documented as of this encounter



Plan of Treatment





Not on filedocumented as of this encounter



Visit Diagnoses









                                         Diagnosis

 





                                         Low TSH level

 





                                         Hyperthyroidism



                                         Thyrotoxicosis without mention of goite

r or other cause, without mention of 

thyrotoxic crisis or



                                         storm



documented in this encounter

## 2020-03-13 NOTE — XMS REPORT
Encounter Summary

                             Created on: 2020



Ran Bansal

External Reference #: [id number]

: 1935

Sex: Female



Demographics





                          Address                   [address] [place]  [address]

 

                          Home Phone                [phone]

 

                          Preferred Language        English

 

                          Marital Status            

 

                          Protestant Affiliation     [id number]

 

                          Race                      White

 

                          Ethnic Group              Not  or 





Author





                          Author                    [facility]

 

                          Organization              [facility]

 

                          Address                   Unknown

 

                          Phone                     Unavailable







Support





                Name            Relationship    Address         Phone

 

                Krista Honeycutt   [employer]            Unknown         [phone]







Care Team Providers





                    Care Team Member Name Role                Phone

 

                    Payam Hutchison   PCP                 [phone]







Reason for Referral

* Diagnostic Imaging (Routine)



                          Referred By Contact       Referred To Contact



                 Status          Reason          Specialty       Diagnoses /  



                                         Procedures  

 

                                        



Mariam Nguyen MD



[address] [facility] 



[place] [address]



Phone: [phone]



Fax: [phone]                       



[facility]



[address] [place] [address]



Phone: [phone]



Fax: [phone]



                     Closed              Cardiology          Diagnoses  



                                         Swelling  



                                         Chest pain,  



                                         unspecified chest  



                                         pain type

  



                                         P  



                                         rocedures  



                                         CV MPI SPECT  



                                         Exercise  







* Diagnostic Imaging (Routine)



                          Referred By Contact       Referred To Contact



                 Status          Reason          Specialty       Diagnoses /  



                                         Procedures  

 

                                        



Mariam Nguyen MD



[address] [facility] 



[place] [address]



Phone: [phone]



Fax: [phone]                       







                           Closed                    Diagnoses  



                                         Swelling

  



                                         P  



                                         rocedures  



                                         Electrocardiogram  



                                         (ECG) without  



                                         magnet  











Reason for Visit

* 



 



                           Reason                    Comments

 

 



                                         Edema 









Encounter Details





                          Care Team                 Description



                     Date                Type                Department  

 

                                        



Mariam Nguyen MD



[address] [facility] 



[place] [address]



[phone] (Fax)                      Swelling (Primary Dx); 

Chest pain, unspecified chest pain type



                     2016          Initial consult     [facility]  



                                         [facility]  



                                         [facility]  



                                         [address] [facility]   



                                         [place] [address]  



                                         [phone]  







Social History





                                        Date



                 Tobacco Use     Types           Packs/Day       Years Used 

 

                                         



                                         Never Smoker    

 

    



                                         Smokeless Tobacco: Never   



                                         Used   







                    Drinks/Week         oz/Week             Comments



                                         Alcohol Use   

 

                                                             



                                         No   







 



                           Sex Assigned at Birth     Date Recorded

 

 



                                         Not on file 







                                        Industry



                           Job Start Date            Occupation 

 

                                        Not on file



                           Not on file               Not on file 







                                        Travel End



                           Travel History            Travel Start 

 





                                         No recent travel history available.



documented as of this encounter



Last Filed Vital Signs





                    Reading             Time Taken          Comments



                                         Vital Sign   

 

                    142/78              2016 10:55 AM CST  



                                         Blood Pressure   

 

                    64                  2016 10:55 AM CST  



                                         Pulse   

 

                    -                   -                    



                                         Temperature   

 

                    -                   -                    



                                         Respiratory Rate   

 

                    -                   -                    



                                         Oxygen Saturation   

 

                    -                   -                    



                                         Inhaled Oxygen   



                                         Concentration   

 

                    62.4 kg (137 lb 9.6 oz) 2016 10:55 AM CST  



                                         Weight   

 

                    157.5 cm (5' 2")    2016 10:55 AM CST  



                                         Height   

 

                    25.17               2016 10:55 AM CST  



                                         Body Mass Index   



documented in this encounter



Progress Notes

* Mariam Nguyen MD - 2016 10:57 AM CST



Formatting of this note might be different from the original.



    [facility]



Appointment Date: 2016



Payam Hutchison DO

[address] [place] [id number]



RE:  Ran Bansal

:   1935  

Visit provider:  Mariam Nguyen MD



Dear Payam Hutchison DO,



I had the pleasure of seeing Ran Bansal in the office today. She is a 80 y.o. fem
sania and presents with the following chief complaints: Edema



HPI:  

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
arding dyspnea on exertion, chest pain, and fatigue.  She denies a prior history
of cardiovascular disease.  She did have an echocardiogram performed in  in 
[place].  This reported normal systolic function with grade III diastolic dysfu
nction and pulmonary artery pressure of 40 mmHg.  Valvular function was normal. 
A cardiac catheterization was recommended at that time to exclude coronary sid
ry disease as the etiology for her symptoms.  She declined the cardiac catheteri
zation as she was concerned about potential complication risks, especially focus
ing on stroke.



She continues to note lifestyle affecting fatigue.  She notes marked dyspnea wit
h minimal exertion and can have intermittent associated symptoms of chest pressu
re and actual chest pain.  She denies these symptoms at rest.  She denies orthop
farshad, PND, lower extremity edema, palpitations, presyncope, syncope, or symptoms 
of stroke.  She has had recent laboratory that did not reveal anemia.  She does 
take thyroid supplementation and thyroid lab was normal as well.  She reports a 
remote history of an autoimmune disorder, though could not elaborate more on thi
s.  She states her blood pressure has been under excellent control.



 There is no problem list on file for this patient.



History reviewed. No pertinent past medical history.



History reviewed. No pertinent past surgical history.



Final Medications:

Current Outpatient Prescriptions 

Medication Sig Dispense Refill 

 thyroid, pork, (ARMOUR) 60 mg Tab tablet Take 60 mg by mouth daily.   



No current facility-administered medications for this visit. 



Allergies 

Allergen Reactions 

 Lasix [Furosemide] Anaphylaxis 

 Naltrexone Analogues  



Family History 

Problem Relation Age of Onset 

 Heart attack Father  

 Kidney cancer Brother  

 Melanoma Brother  



Social History:

History 

Substance Use Topics 

 Smoking status: Never Smoker  

 Smokeless tobacco: Never Used 

 Alcohol Use: No 



Review of Systems 

Constitution: Positive for malaise/fatigue. Negative for fever, weakness and nig
ht sweats. 

HENT: Negative for nosebleeds.  

Eyes: Negative for vision loss in left eye and vision loss in right eye. 

Cardiovascular: Positive for cyanosis, irregular heartbeat, leg swelling and pal
pitations. Negative for chest pain, claudication, dyspnea on exertion, near-sync
ope, orthopnea and syncope. 

Respiratory: Positive for snoring. Negative for cough, hemoptysis, shortness of 
breath, sleep disturbances due to breathing and wheezing.  

Endocrine: Positive for cold intolerance. Negative for polydipsia. 

Hematologic/Lymphatic: Does not bruise/bleed easily. 

Skin: Negative for rash. 

Musculoskeletal: Positive for myalgias. Negative for joint pain. 

Gastrointestinal: Negative for dysphagia, hematochezia, nausea and vomiting. 

Genitourinary: Negative for hematuria. 

Neurological: Negative for brief paralysis, disturbances in coordination, excess
sarah daytime sleepiness, dizziness, light-headedness, loss of balance and numbnes
s. 

Psychiatric/Behavioral: Negative for depression. 

All other systems reviewed and are negative.



Vital Signs 

 16 1055 

BP: 142/78 

Pulse: 64 

Height: 1.575 m (5' 2") 

Weight: 62.415 kg (137 lb 9.6 oz) 

 

BMI: Body mass index is 25.16 kg/(m^2).

Physical Exam 

Constitutional: She is oriented to person, place, and time. She appears well-dev
eloped. 

Eyes: Pupils are equal, round, and reactive to light. No scleral icterus. 

Neck: Neck supple. No JVD present. Carotid bruit is not present. 

Cardiovascular: Normal rate and normal heart sounds.  

Pulmonary/Chest: Breath sounds normal. 

Abdominal: Soft. There is no tenderness. 

Musculoskeletal: She exhibits no edema. 

Neurological: She is alert and oriented to person, place, and time. 

Skin: Skin is warm. 

Psychiatric: She has a normal mood and affect. 

Vitals reviewed.



No results found for: CHOL, LDL, HDL, TRIG

No results found for: LDLCHOL



EKG:  Sinus rhythm with PACs.



Encounter Diagnoses 

Name Primary? 

 Swelling Yes 

 Chest pain, unspecified chest pain type  



Impression:

Symptoms of limiting dyspnea on exertion with chest pressure and discomfort.  Ce
rtainly her symptomatology is suspicious for angina with coronary artery disease
 as the etiology.  The symptom of overwhelming fatigue that affects quality of l
kourtney in a previously active female also raises the question of anginal equivalent
, all of which warrant further cardiovascular evaluation.  Certainly, one may be
 suspicious of diastolic dysfunction in a postmenopausal female contributing to 
dyspnea on exertion, though the severity of her symptoms appears disproportionat
e.



Recommendations:

1.  I did spend the majority of the visit with her and her daughter discussing r
isk factors and preventive strategies.

2.  I have asked that she document blood pressure during her symptomatic episode
s.

3.  Lastly, we did discuss cardiac catheterization and respectfully she is quite
 hesitant.  She is agreeable to do an exercise nuclear stress test to address is
chemia.  I will review these results with her with further recommendations.  If 
this is normal, one may consider pulmonary function tests.  Another consideratio
n could be that of a cardiac MRI to exclude infiltrative disorder contributing t
o diastolic dysfunction.  Of note, the voltage on her EKG was normal.



Thank you for allowing me to participate in Ran Bansal's care.  If I can be of an
y further assistance, please do not hesitate to contact me.



Sincerely,

Mariam Nguyen MD



TLS/tmo







Electronically signed by Mariam Nguyen MD at 2016  3:07 PM CST

documented in this encounter



Plan of Treatment





                                        Order Schedule



                 Name            Type            Priority        Associated Diag

noses 

 

                                        1 Occurrences starting 2016 until 

2017



                 CV MPI SPECT Exercise  Cardiac         Routine         Swelling

 



                           Services                  Chest pain, unspecified 



                                         chest pain type 



documented as of this encounter



Procedures





                                        Comments



                 Procedure Name  Priority        Date/Time       Associated Diag

nosis 

 

                                        



Results for this procedure are in the results section.



                 ECG             Routine         2016      Swelling 



                                         11:03 AM CST  



documented in this encounter



Results

* Electrocardiogram (ECG) without magnet (2016 11:03 AM CST)







                                         Specimen

 









 



                           Narrative                 Performed At

 

 



                           This result has an attachment that is not available. 

 [facility]



                                                 [facility] 



                                                      

      



                                                      

      Test Date:  



                                         2016 



                                         Pat Name:   RAN BANSAL     

   Department:  [facility] 



                                         Patient ID:  [id number]      

   Room:     



                                         Gender:    Female      

    Technician:  Sharon Mireles 



                                         :     1935    

    Requested By: MARIAM NGUYEN 



                                         Order Number: [id number]      

  Reading MD:  Mariam Nguyen 



                                                      

   Measurements 



                                         Intervals          

     Axis      



                                         Rate:     70       

     P:      78 



                                         ND:      196      

     QRS:     -2 



                                         QRSD:     96       

     T:      12 



                                         QT:      404      

             



                                         QTc:     436      

             



                                                      

Interpretive Statements 



                                         SINUS RHYTHM 



                                         ATRIAL PREMATURE COMPLEX 



                                         No previous ECG available for compariso

n 



                                         Electronically Signed On 2016 17:3

2:01 CST by Mariam Nguyen 







   



                 Performing Organization  Address         City/State/Zipcode  Ph

one Number

 

   



                                         [facility]   





documented in this encounter



Visit Diagnoses









                                         Diagnosis

 





                                         Swelling



                                         Localized superficial swelling, mass, o

r lump

 





                                         Chest pain, unspecified chest pain type



documented in this encounter

## 2020-03-13 NOTE — XMS REPORT
Encounter Summary

                             Created on: 2020



Alvina Charles

External Reference #: [id number]

: 1935

Sex: Female



Demographics





                          Address                   [address] [place]  [address]

 

                          Home Phone                [phone]

 

                          Preferred Language        English

 

                          Marital Status            

 

                          Lutheran Affiliation     [id number]

 

                          Race                      White

 

                          Ethnic Group              Not  or 





Author





                          Author                    [facility]

 

                          Organization              [facility]

 

                          Address                   Unknown

 

                          Phone                     Unavailable







Support





                Name            Relationship    Address         Phone

 

                Krista Honeycutt   [employer]            Unknown         [phone]







Care Team Providers





                    Care Team Member Name Role                Phone

 

                    Payam Hutchison   PCP                 [phone]







Encounter Details





                          Care Team                 Description



                     Date                Type                Department  

 

                                        



Mariam Nguyen MD



[address] [facility] 



[place] [address] [phone] (Fax)                       



                     2019          Documentation       [facility]  



                                         [facility]  



                                         [facility]  



                                         [address] [place] [address]  



                                         [address]  



                                         [place] [address] [phone]  







Social History





                                        Date



                 Tobacco Use     Types           Packs/Day       Years Used 

 

                                         



                                         Never Smoker    

 

    



                                         Smokeless Tobacco: Never   



                                         Used   







                    Drinks/Week         oz/Week             Comments



                                         Alcohol Use   

 

                                                             



                                         No   







 



                           Sex Assigned at Birth     Date Recorded

 

 



                                         Not on file 







                                        Industry



                           Job Start Date            Occupation 

 

                                        Not on file



                           Not on file               Not on file 







                                        Travel End



                           Travel History            Travel Start 

 





                                         No recent travel history available.



documented as of this encounter



Plan of Treatment





Not on filedocumented as of this encounter



Visit Diagnoses

Not on filedocumented in this encounter

## 2020-03-13 NOTE — XMS REPORT
Encounter Summary

                             Created on: 2020



IraidabipinAlvina snellTuyet

External Reference #: [id number]

: 1935

Sex: Female



Demographics





                          Address                   [address] [place]  [address]

 

                          Home Phone                [phone]

 

                          Preferred Language        English

 

                          Marital Status            

 

                          Confucianist Affiliation     1013

 

                          Race                      White

 

                          Ethnic Group              Not  or 





Author





                          Author                    [facility]

 

                          Organization              [facility]

 

                          Address                   Unknown

 

                          Phone                     Unavailable







Support





                Name            Relationship    Address         Phone

 

                Krista Honeycutt   [employer]            Unknown         [phone]







Care Team Providers





                    Care Team Member Name Role                Phone

 

                    Payam Hutchison   PCP                 [phone]







Encounter Details





                          Care Team                 Description



                     Date                Type                Department  

 

                                        



Jaida Myers RN                    



                     2019          Telephone           [facility]  



                                         [facility]  



                                         [facility]  



                                         [address]  



                                         [address]  



                                         [place] [address]  



                                         [phone]  







Social History





                                        Date



                 Tobacco Use     Types           Packs/Day       Years Used 

 

                                         



                                         Never Smoker    

 

    



                                         Smokeless Tobacco: Never   



                                         Used   







                    Drinks/Week         oz/Week             Comments



                                         Alcohol Use   

 

                                                             



                                         No   







 



                           Sex Assigned at Birth     Date Recorded

 

 



                                         Not on file 







                                        Industry



                           Job Start Date            Occupation 

 

                                        Not on file



                           Not on file               Not on file 







                                        Travel End



                           Travel History            Travel Start 

 





                                         No recent travel history available.



documented as of this encounter



Miscellaneous Notes

* Telephone Encounter - Tenisha Cardona RN - 2019  4:40 PM CDT



Returned call to pts daughter and informed her of Dr. Patrick mitchell. She v/u and 
states that they are following up with pts PCP regarding her thyroid. I informed
her if once her thyroid is treated if she continues to have symptoms to call us 
back. She v/u.



Electronically signed by Tenisha Cardona RN at 2019  4:45 PM CDT

* Telephone Encounter - Mariam Nguyen MD - 2019  4:15 PM CDT



Ok to dc and defer monitor.

Suspect her sxs in part related to abnormal thyroid noted on recent blood test; 
please verify is following up with PCP



Electronically signed by Mariam Nguyen MD at 2019  4:16 PM CDT

* Telephone Encounter - Jaida Myers RN - 2019  1:51 PM CDT



Formatting of this note might be different from the original.

Returned call to patient's dtrDeontea, who reports that she just found out patiedna
t's zio patch only lasted about one day and she has not been wearing since. Zoila
sed her to call the customer service number on the patch box/info to let them kn
ow it didn't stick and so mailing back in early. Discussed possibly trying [facility]
er heart monitor/event recorder, and dtr expressed concerns of pt being able to 
keep on. She states that she has been battling with some type of dementia, thoug
h the patient refuses to believe it, and has a hard time remembering to leave th
ings on and alone. Will pass along to  for recs.



Pt dtr would like CV MRI scheduling to call her to schedule appt as patient is f
orgetful and dtr is transportation. Note placed on appt for scheduling order wit
hielaine [facility].



Gave results and recs per Dr. Nguyen of recent lab testing.



Mariam Nguyen MD  P [facility] - [facility] 

 

 

 

Please run thyroid cascade off of blood in lab; may be hyperthyroid which may be
culprit of sxs  



Called [facility], spoke with Uriel, and able to add on thyroid cascade to labs. Emigdio gonzalez.



Electronically signed by Jaida Myers RN at 2019  2:06 PM CDT

documented in this encounter



Plan of Treatment





Not on filedocumented as of this encounter



Visit Diagnoses









                                         Diagnosis

 





                                         Low TSH level

 





                                         Hyperthyroidism



                                         Thyrotoxicosis without mention of goite

r or other cause, without mention of 

thyrotoxic crisis or



                                         storm



documented in this encounter

## 2020-03-13 NOTE — XMS REPORT
Encounter Summary

                             Created on: 2020



IraidabipinAlvina snelle

External Reference #: [id number]

: 1935

Sex: Female



Demographics





                          Address                   [address] [place]  [address]

 

                          Home Phone                [phone]

 

                          Preferred Language        English

 

                          Marital Status            

 

                          Druze Affiliation     1013

 

                          Race                      White

 

                          Ethnic Group              Not  or 





Author





                          Author                    [facility]

 

                          Organization              [facility]

 

                          Address                   Unknown

 

                          Phone                     Unavailable







Support





                Name            Relationship    Address         Phone

 

                Krista Honeycutt   [employer]            Unknown         [phone]







Care Team Providers





                    Care Team Member Name Role                Phone

 

                    HutchisonPayam coles   PCP                 [phone]







Reason for Visit

* 



 



                           Reason                    Comments

 

 



                                         Returning patient call 









Encounter Details





                          Care Team                 Description



                     Date                Type                Department  

 

                                        



Cydney Mullins, RN                        Returning patient call



                     10/08/2018          Telephone           [facility]  



                                         [facility]  



                                         [facility]  



                                         [address]  



                                         [address]  



                                         [place] [address]  



                                         [phone]  







Social History





                                        Date



                 Tobacco Use     Types           Packs/Day       Years Used 

 

                                         



                                         Never Smoker    

 

    



                                         Smokeless Tobacco: Never   



                                         Used   







                    Drinks/Week         oz/Week             Comments



                                         Alcohol Use   

 

                                                             



                                         No   







 



                           Sex Assigned at Birth     Date Recorded

 

 



                                         Not on file 







                                        Industry



                           Job Start Date            Occupation 

 

                                        Not on file



                           Not on file               Not on file 







                                        Travel End



                           Travel History            Travel Start 

 





                                         No recent travel history available.



documented as of this encounter



Miscellaneous Notes

* Telephone Encounter - Cydney Mullins RN - 10/08/2018 12:11 PM CDT



Received a VM from pt reporting she needs to have a recent Cr done within 60 day
s before her next apt. Pt reports she checked her records and had a Cr drawn 67 
days ago. Pt is wondering if this Cr would be sufficient. 

Chart reviewed. Pt has a Cardiac MRI scheduled on 10/16/18. 

I attempted to return pt's call. Call went to VM. I did leave a message includin
g if Cr is for the Cardiac MRI, she will need to have an updated level drawn. Ca
ll back number left if there are any other questions or concerns. 



Electronically signed by Cydney Mullins RN at 10/08/2018 12:13 PM CDT

documented in this encounter



Plan of Treatment





Not on filedocumented as of this encounter



Visit Diagnoses

Not on filedocumented in this encounter

## 2020-03-13 NOTE — XMS REPORT
Continuity of Care Document

                             Created on: 2020



RAN BANSAL

External Reference #: [id number]

: 1935

Sex: Female



Demographics





                          Address                   [address] [place]  [address]

 

                          Home Phone                [phone] x

 

                          Preferred Language        Unknown

 

                          Marital Status            Unknown

 

                          [place] Affiliation     Unknown

 

                          Race                      Unknown

 

                          Ethnic Group              Unknown





Author





                          Organization              Unknown

 

                          Address                   Unknown

 

                          Phone                     Unavailable



              



Allergies

      



             Active           Description           Code           Type         

  Severity   

                Reaction           Onset           Reported/Identified          

 

Relationship to Patient                 Clinical Status        

 

             Yes           ANTIBIOTICS ALL           ANTIBIOTICS ALL            

            

Unknown           N/A                        2014                         

  

     

 

           Yes           MIRANDA           MIRANDA                       Unknown  

         

N/A                             2014                                    

 

           Yes           CODEINE           CODEINE                       Unknown

           

N/A                             2014                                    

 

             Yes           CORTISONE           CORTISONE                        

Unknown       

             N/A                        2014                              

   

 

           Yes           DYE           DYE                       Unknown        

   N/A     

                                2014                                    

 

             Yes           egg           [id number]           Drug Allergy      

     Unknown 

             N/A                        2014                              

   

 

             Yes           latex           [id number]           Drug Allergy    

       

Unknown           N/A                        2014                         

  

     

 

             Yes           milk           [id number]           Drug Allergy     

      Unknown

             N/A                        2014                              

   



                                



Medications

      



There is no data.                  



Problems

      



             Date Dx Coded           Attending           Type           Code    

       

Diagnosis                               Diagnosed By        

 

             2012                        Ot           780.79           OTH

 MALAISE 

FATIGUE                                          

 

             2012                        Ot           787.02           DYAN

SEA ALONE     

                                                 

 

             2012                        Ot           791.9           ABN 

URINE FINDINGS

NEC                                              

 

                2013           SARWAT FITZPATRICK DO           Ot        

      244.9        

                          HYPOTHYROIDISM NOS                    

 

                2013           SARWAT FITZPATRICK DO           Ot        

      250.00       

                          DIAB FLAVIA WO COMPL, TYPE II OR UNSPEC TY              

      

 

                2013           SARWAT FITZPATRICK DO           Ot        

      401.9        

                          HYPERTENSION NOS                    

 

                2013           SARWAT FITZPATRICK DO           Ot        

      424.1        

                          AORTIC VALVE DISORDER                    

 

                2013           SARWAT FITZPATRICK DO           Ot        

      433.10       

                          CAROTID ARTERY OCCLUSION W O CEREBRAL IN              

      

 

                2013           SARWAT FITZPATRICK DO           Ot        

      786.50       

                          CHEST PAIN NOS                     

 

                2014           SARWAT FITZPATRICK DO           Ot        

      244.9        

                          HYPOTHYROIDISM NOS                    

 

                2014           SARWAT FITZPATRICK DO           Ot        

      276.51       

                          DEHYDRATION                        

 

                2014           SARWAT FITZPATRICK DO           Ot        

      424.1        

                          AORTIC VALVE DISORDER                    

 

                2014           SARWAT FITZPATRICK DO           Ot        

      458.0        

                          ORTHOSTATIC HYPOTENSION                    

 

                2014           SARWAT FITZPATRICK DO           Ot        

      482.9        

                          BACTERIAL PNEUMONIA NOS                    

 

                2014           NIKI FITZPATRICK ARNP           Ot     

         240.9     

                                                             

 

                2014           NIKI FITZPATRICK ARNP           Ot     

         787.20    

                                                             

 

           2015                       Ot           536.8                  

           

  

 

           2015                       Ot           789.01                 

           

   

 

           2015                       Ot           719.47                 

           

   

 

           2015                       Ot           729.5                  

           

  

 

           2015                       Ot           959.7                  

           

  

 

           2015                       Ot           E000.8                 

           

   

 

           2015                       Ot           E849.0                 

           

   

 

           2015                       Ot           E888.9                 

           

   

 

           2015                       Ot           785.1                  

           

  

 

           2015                       Ot           733.90                 

           

   

 

           2015                       Ot           244.9                  

           

  

 

           2015                       Ot           536.8                  

           

  

 

           2015                       Ot           786.50                 

           

   

 

           2015                       Ot           627.1                  

           

  

 

           2015                       Ot           789.01                 

           

   

 

                2015           DORINA NIKI WHARTON APRN           Ot      

        719.41     

                                                             

 

                2015           DORINANIKI APRN           Ot      

        723.1      

                                                             

 

                2015           NIKI FITZPATRICK ARNP           Ot     

         241.0     

                                                             

 

                2015           ALEENA FITZPATRICKIA DEMETRIUS ARNP           Ot     

         V67.9     

                                                             

 

                2015           VIKY FITZPATRICKRICIA L ARNP           Ot     

         788.41    

                                                             

 

                2015           VARUN BENZ, LAUREN MORENO           Ot          

    780.79         

                                                             

 

                2015           LAUREN BOND MD           Ot          

    793.11         

                                                             

 

                2015           SARWAT FITZPATRICK DO           Ot        

      486          

                                                             

 

                2015           SARWAT FITZPATRICK DO           Ot        

      780.60       

                                                             

 

                2015           NIKI FITZPATRICK L ARNP           Ot     

         240.9     

                                                             

 

                2015           VIKY FITZPATRICKRICIA L ARNP           Ot     

         787.20    

                                                             

 

                2015           VIKY FITZPATRICKRICIA L ARNP           Ot     

         789.09    

                                                             

 

                2015           VIKY FITZPATRICKRICIA L ARNP           Ot     

         789.09    

                                                             

 

                2015           VIKY FITZPATRICKRICIA L ARNP           Ot     

         786.50    

                                                             

 

                2015           ALEENA FITZPATRICKIA L ARNP           Ot     

         786.50    

                                                             

 

                2015           VIKY FITZPATRICKRICIA L ARNP           Ot     

         786.50    

                                                             

 

                2015           NIKI FITZPATRICK L ARNP           Ot     

         R05       

                                                             

 

                2016           FITZPATRICKNIKI MASTERSON L ARNP           Ot     

         I25.10    

                                                             

 

                2016           FITZPATRICKALEENA MASTERSONIA L ARNP           Ot     

         I65.23    

                                                             

 

                2016           FITPZATRICK, NIKI L ARNP           Ot     

         I73.9     

                                                             

 

                2016           FITZPATRICK, NIKI L ARNP           Ot     

         R42       

                                                             

 

                2016           FITZPATRICK, NIKI L ARNP           Ot     

         I25.10    

                                                             

 

                2016           FITZPATRICK, NIKI L ARNP           Ot     

         I65.23    

                                                             

 

                2016           FITZPATRICKALEENA MASTERSONIA L ARNP           Ot     

         I73.9     

                                                             

 

                2016           FITZPATRICK, NIKI L ARNP           Ot     

         R42       

                                                             

 

                2016           FITZPATRICK, NIKI L ARNP           Ot     

         I25.10    

                                                             

 

                2016           FITZPATRICK, NIKI L ARNP           Ot     

         I65.23    

                                                             

 

                2016           FITZPATRICKALEENAIA L ARNP           Ot     

         I73.9     

                                                             

 

                2016           FITZPATRICKALEENAIA L ARNP           Ot     

         R42       

                                                             

 

                2016           FITZPATRICKALEENA MASTERSONIA L ARNP           Ot     

         I25.10    

                                                             

 

                2016           FITZPATRICKALEENA MASTERSONIA L ARNP           Ot     

         I65.23    

                                                             

 

                2016           FITZPATRICKALEENAIA L ARNP           Ot     

         I73.9     

                                                             

 

                2016           FITZPATRICKVIKYNIKI L ARNP           Ot     

         R42       

                                                             

 

                08/15/2016           SARWAT FITZPATRICK DO           Ot        

      R10.13       

                          EPIGASTRIC PAIN                    

 

                08/15/2016           SARWAT FITZPATRICK DO           Ot        

      R19.03       

                          RIGHT LOWER QUADRANT ABDOMINAL SWELLING,              

      

 

                2016           SARWAT FITZPARTICK DO           Ot        

      R10.13       

                          EPIGASTRIC PAIN                    

 

                2016           SARWAT FITZPATRICK DO           Ot        

      R19.03       

                          RIGHT LOWER QUADRANT ABDOMINAL SWELLING,              

      

 

                2016           NIKI CAM APRN           Ot      

        719.41     

                          JOINT PAIN-SHLDER                    

 

                2016           NIKI CAM APRN           Ot      

        723.1      

                          CERVICALGIA                        

 

                2016           SARWAT FITZPATRICK DO           Ot        

      R10.13       

                          EPIGASTRIC PAIN                    

 

                2016           SARWAT FITZPATRICK DO           Ot        

      R19.03       

                          RIGHT LOWER QUADRANT ABDOMINAL SWELLING,              

      

 

             10/05/2016                        Ot           719.47           TORRES

NT PAIN-ANKLE 

                                                 

 

             10/05/2016                        Ot           729.5           PAIN

 IN LIMB      

                                                 

 

             10/05/2016                        Ot           959.7           LOWE

R LEG INJURY 

NOS                                              

 

             10/05/2016                        Ot           E000.8           OTH

ER EXTERNAL 

CAUSE STATUS                                     

 

             10/05/2016                        Ot           E849.0           ACC

IDENT IN HOME 

                                                 

 

             10/05/2016                        Ot           E888.9           FAL

L NOS         

                                                 

 

             10/05/2016                        Ot           785.1           PALP

ITATIONS      

                                                 

 

             10/05/2016                        Ot           733.90           BON

E   CARTILAGE 

DIS NOS                                          

 

             10/05/2016                        Ot           244.9           HYPO

THYROIDISM NOS

                                                 

 

             10/05/2016                        Ot           536.8           STOM

ACH FUNCTION 

DIS NEC                                          

 

             10/05/2016                        Ot           786.50           MELVINA

ST PAIN NOS   

                                                 

 

             10/05/2016                        Ot           627.1           POST

MENOPAUSAL 

BLEEDING                                         

 

             10/05/2016                        Ot           789.01           ABD

OMINAL PAIN, 

RIGHT UPPER QUADRANT                             

 

                10/05/2016           RIDINGS, NIKI C APRN           Ot      

        719.41     

                          JOINT PAIN-SHLDER                    

 

                10/05/2016           RIDINGS, NIKI C APRN           Ot      

        723.1      

                          CERVICALGIA                        

 

                10/05/2016           NIKI FITZPATRICK L ARNP           Ot     

         241.0     

                          NONTOX UNINODULAR GOITER                    

 

                10/05/2016           NIKI FITZPATRICK L ARNP           Ot     

         V67.9     

                          FOLLOW-UP EXAM NOS                    

 

                10/05/2016           NIKI FITZPATRICK ARNP           Ot     

         788.41    

                          URINARY FREQUENCY                    

 

                10/05/2016           VARUN BENZ, LAUREN MORENO           Ot          

    780.79         

                          OTH MALAISE FATIGUE                    

 

                10/05/2016           VARUN BENZ, LAUREN MORENO           Ot          

    793.11         

                          SOLITARY PULMONARY NODULE                    

 

                10/05/2016           SARWAT FITZPATRICK DO           Ot        

      486          

                          PNEUMONIA, ORGANISM NOS                    

 

                10/05/2016           SARWAT FITZPATRICK DO           Ot        

      780.60       

                          FEVER, UNSPECIFIED                    

 

                10/05/2016           NIKI FITZPATRICK L ARNP           Ot     

         R05       

                          COUGH                              

 

                10/05/2016           NIKI FITZPATRICK L ARNP           Ot     

         240.9     

                          GOITER NOS                         

 

                10/05/2016           ALEENA FITZPATRICKIA L ARNP           Ot     

         787.20    

                          DYSPHAGIA, UNSPECIFIED                    

 

                10/05/2016           ALEENA FITZPATRICKIA L ARNP           Ot     

         789.09    

                          ABDOMINAL PAIN, OTHER SPECIFIED SITE                  

  

 

                10/05/2016           NIKI FITZPATRICK L ARNP           Ot     

         790.29    

                          OTHER ABNORMAL GLUCOSE                    

 

                10/05/2016           JOVANNA BARRY DO           Ot       

       527.7       

                          SALIVARY SECRETION DIS                    

 

                10/05/2016           JOVANNA BARRY DO           Ot       

       780.79      

                          OTH MALAISE FATIGUE                    

 

                10/05/2016           JOVANNA BARRY DO           Ot       

       787.99      

                          OTHER GI SYSTEM SYMPTOMS                    

 

                10/05/2016           JOVANNA BARRY DO           Ot       

       789.06      

                          ABDOMINAL PAIN, EPIGASTRIC                    

 

                10/05/2016           NIKI FITZPATRICKP           Ot     

         786.50    

                          CHEST PAIN NOS                     

 

                10/05/2016           NIKI FITZPATRICK ARNP           Ot     

         I25.10    

                          ATHSCL HEART DISEASE OF NATIVE CORONARY               

      

 

                10/05/2016           NIKI FITZPATRICK ARNP           Ot     

         I65.23    

                          OCCLUSION AND STENOSIS OF BILATERAL TERRAZAS              

      

 

                10/05/2016           NIKI FITZPATRICK ARNP           Ot     

         I73.9     

                          PERIPHERAL VASCULAR DISEASE, UNSPECIFIED              

      

 

                10/05/2016           NIKI FITZPATRICKP           Ot     

         R42       

                          DIZZINESS AND GIDDINESS                    

 

                10/05/2016           SARWAT FITZPATRICK DO           Ot        

      R10.13       

                          EPIGASTRIC PAIN                    

 

                10/05/2016           SARWAT FITZPATRICK DO           Ot        

      R19.03       

                          RIGHT LOWER QUADRANT ABDOMINAL SWELLING,              

      

 

                10/06/2016           NIKI FITZPATRICK ARNP           Ot     

         I25.10    

                          ATHSCL HEART DISEASE OF NATIVE CORONARY               

      

 

                10/06/2016           NIKI FITZPATRICK ARNP           Ot     

         I65.23    

                          OCCLUSION AND STENOSIS OF BILATERAL TERRAZAS              

      

 

                10/06/2016           ALEENA FITZPATRICKIA DEMETRIUS ARNP           Ot     

         I73.9     

                          PERIPHERAL VASCULAR DISEASE, UNSPECIFIED              

      

 

                10/06/2016           NIKI FITZPATRICKP           Ot     

         R42       

                          DIZZINESS AND GIDDINESS                    

 

                10/06/2016           SARWAT FITZPATRICK DO           Ot        

      R10.13       

                          EPIGASTRIC PAIN                    

 

                10/06/2016           SARWAT FITZPATRICK DO           Ot        

      R19.03       

                          RIGHT LOWER QUADRANT ABDOMINAL SWELLING,              

      

 

                10/07/2016           SARWAT FITZPATRICK DO           Ot        

      J44.9        

                          CHRONIC OBSTRUCTIVE PULMONARY DISEASE, U              

      

 

                10/26/2016           SARWAT FITZPATRICK DO           Ot        

      J44.9        

                          CHRONIC OBSTRUCTIVE PULMONARY DISEASE, U              

      

 

                2017           NIKI FITZPATRICKP           Ot     

         R10.11    

                          RIGHT UPPER QUADRANT PAIN                    

 

                2018           NIKI CAM APRN           Ot      

        719.41     

                          JOINT PAIN-SHLDER                    

 

                2018           NIKI CAM APRN           Ot      

        723.1      

                          CERVICALGIA                        

 

                2018           NIKI FITZPATRICK ARNP           Ot     

         241.0     

                          NONTOX UNINODULAR GOITER                    

 

                2018           NIKI FITZPATRICK ARNP           Ot     

         V67.9     

                          FOLLOW-UP EXAM NOS                    

 

                2018           AMARI NIKI ROMO ARNP           Ot     

         788.41    

                          URINARY FREQUENCY                    

 

                2018           LAUREN BOND MD           Ot          

    780.79         

                          OTH MALAISE FATIGUE                    

 

                2018           LAUREN BOND MD           Ot          

    793.11         

                          SOLITARY PULMONARY NODULE                    

 

                2018           SARWAT FITZPATRICK DO           Ot        

      486          

                          PNEUMONIA, ORGANISM NOS                    

 

                2018           SARWAT FITZPATRICK DO           Ot        

      780.60       

                          FEVER, UNSPECIFIED                    

 

                2018           NIKI FITZPATRICK ARNP           Ot     

         R05       

                          COUGH                              

 

                2018           NIKI FITZPATRICK ARNP           Ot     

         240.9     

                          GOITER NOS                         

 

                2018           NIKI FITZPATRICK ARNP           Ot     

         787.20    

                          DYSPHAGIA, UNSPECIFIED                    

 

                2018           NIKI FITZPATRICK ARNP           Ot     

         789.09    

                          ABDOMINAL PAIN, OTHER SPECIFIED SITE                  

  

 

                2018           NIKI FITZPATRICK ARNP           Ot     

         790.29    

                          OTHER ABNORMAL GLUCOSE                    

 

                2018           JOVANNA BARRY DO           Ot       

       527.7       

                          SALIVARY SECRETION DIS                    

 

                2018           JOVANNA BARRY DO           Ot       

       780.79      

                          OTH MALAISE FATIGUE                    

 

                2018           JOVANNA BARRY DO           Ot       

       787.99      

                          OTHER GI SYSTEM SYMPTOMS                    

 

                2018           JOVANNA BARRY DO           Ot       

       789.06      

                          ABDOMINAL PAIN, EPIGASTRIC                    

 

                2018           NIKI FITZPATRICK ARNP           Ot     

         786.50    

                          CHEST PAIN NOS                     

 

                2018           NIKI FITZPATRICK ARNP           Ot     

         I25.10    

                          ATHSCL HEART DISEASE OF NATIVE CORONARY               

      

 

                2018           NIKI FITZPATRICK L ARNP           Ot     

         I65.23    

                          OCCLUSION AND STENOSIS OF BILATERAL TERRAZAS              

      

 

                2018           NIKI FITZPATRICK L ARNP           Ot     

         I73.9     

                          PERIPHERAL VASCULAR DISEASE, UNSPECIFIED              

      

 

                2018           NIKI FITZPATRICK L ARNP           Ot     

         R42       

                          DIZZINESS AND GIDDINESS                    

 

                2018           SARWAT FITZPATRICK DO           Ot        

      R10.13       

                          EPIGASTRIC PAIN                    

 

                2018           SARWAT FITZPATRICK DO           Ot        

      R19.03       

                          RIGHT LOWER QUADRANT ABDOMINAL SWELLING,              

      

 

                2018           SARWAT FITZPATRICK DO, Ot        

      J44.9        

                          CHRONIC OBSTRUCTIVE PULMONARY DISEASE, U              

      

 

                2018           NIKI FITZPATRICK           Ot     

         R10.11    

                          RIGHT UPPER QUADRANT PAIN                    

 

                2018           SARWAT FITZPATRICK DO, Ot        

      D18.09       

                          HEMANGIOMA OF OTHER SITES                    

 

                2018           SARWAT FITZPATRICK DO           Ot        

      G57.92       

                          UNSPECIFIED MONONEUROPATHY OF LEFT LOWER              

      

 

                2018           SARWAT FITZPATRICK DO, Ot        

      M47.816      

                          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              

      

 

                2018           SARWAT FITZPATRICK DO, Ot        

      Z86.69       

                          PERSONAL HISTORY OF DIS OF THE NERVOUS S              

      

 

                2018           SARWAT FITZPATRICK DO, Ot        

      D18.09       

                          HEMANGIOMA OF OTHER SITES                    

 

                2018           SARWAT FITZPATRICK DO, Ot        

      G57.92       

                          UNSPECIFIED MONONEUROPATHY OF LEFT LOWER              

      

 

                2018           SARWAT FITZPATRICK DO, Ot        

      M47.816      

                          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              

      

 

                2018           SARWAT FITZPATRICK DO           Ot        

      Z86.69       

                          PERSONAL HISTORY OF DIS OF THE NERVOUS S              

      

 

                2020           SARWAT FITZPATRICK DO           Ot        

      G45.9        

                          TRANSIENT CEREBRAL ISCHEMIC ATTACK, UNSP              

      

 

                2020           NIKI FITZPATRICK ARNP           Ot     

         R05       

                          COUGH                              

 

                2020           NIKI FITZPATRICK ARNP           Ot     

         240.9     

                          GOITER NOS                         

 

                2020           NIKI FITZPATRICK ARNP           Ot     

         787.20    

                          DYSPHAGIA, UNSPECIFIED                    

 

                2020           NIKI FITZPATRICK ARNP           Ot     

         789.09    

                          ABDOMINAL PAIN, OTHER SPECIFIED SITE                  

  

 

                2020           NIKI FITZPATRICK ARNP           Ot     

         790.29    

                          OTHER ABNORMAL GLUCOSE                    

 

                2020           JOVANNA BARRY DO           Ot       

       527.7       

                          SALIVARY SECRETION DIS                    

 

                2020           JOVANNA BARRY DO           Ot       

       780.79      

                          OTH MALAISE FATIGUE                    

 

                2020           JOVANAN BARRY DO           Ot       

       787.99      

                          OTHER GI SYSTEM SYMPTOMS                    

 

                2020           JOVANNA BARRY DO           Ot       

       789.06      

                          ABDOMINAL PAIN, EPIGASTRIC                    

 

                2020           NIKI FITZPATRICK           Ot     

         786.50    

                          CHEST PAIN NOS                     

 

                2020           NIKI FITZPATRICKP           Ot     

         I25.10    

                          ATHSCL HEART DISEASE OF NATIVE CORONARY               

      

 

                2020           NIKI FITZPATRICK           Ot     

         I65.23    

                          OCCLUSION AND STENOSIS OF BILATERAL TERRAZAS              

      

 

                2020           NIKI FITZPATRICK           Ot     

         I73.9     

                          PERIPHERAL VASCULAR DISEASE, UNSPECIFIED              

      

 

                2020           NIKI FITZPATRICK           Ot     

         R42       

                          DIZZINESS AND GIDDINESS                    

 

                2020           FITZPATRICK DO, SARWAT ZAPATA           Ot        

      R10.13       

                          EPIGASTRIC PAIN                    

 

                2020           FITZPATRICK DO, SARWAT ZAPATA           Ot        

      R19.03       

                          RIGHT LOWER QUADRANT ABDOMINAL SWELLING,              

      

 

                2020           FITZPATRICK DO, SARWAT ZAPATA Ot        

      J44.9        

                          CHRONIC OBSTRUCTIVE PULMONARY DISEASE, U              

      

 

                2020           NIKI FITZPATRICK           Ot     

         R10.11    

                          RIGHT UPPER QUADRANT PAIN                    

 

                2020           FITZPATRICK DO, SARWAT ZAPATA           Ot        

      D18.09       

                          HEMANGIOMA OF OTHER SITES                    

 

                2020           FITZPATRICK DO, SARWAT ZAPATA           Ot        

      G57.92       

                          UNSPECIFIED MONONEUROPATHY OF LEFT LOWER              

      

 

                2020           FITZPATRICKSARWAT MASTERSON DO, Ot        

      M47.816      

                          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              

      

 

                2020           FITZPATRICK , SARWAT ZAPATA           Ot        

      Z86.69       

                          PERSONAL HISTORY OF DIS OF THE NERVOUS S              

      

 

                2020           AMARI ZAZUETA, SARWAT ZAPATA Ot        

      G45.9        

                          TRANSIENT CEREBRAL ISCHEMIC ATTACK, UNSP              

      

 

                2020           SARWAT FITZPATRICK DO, Ot        

      I65.23       

                          OCCLUSION AND STENOSIS OF BILATERAL TERRAZAS              

      



                                                                                
                                                                                
                                                                                
                                                                                
                                                  



Procedures

      



There is no data.                  



Results

      



                    Test                Result              Range        

 

                                        Complete blood count (CBC) with automate

d white blood cell (WBC) differential - 

20 17:00         

 

                          Blood leukocytes automated count (number/volume)      

     5.9 10*3/uL          

                                        4.3-11.0        

 

                          Blood erythrocytes automated count (number/volume)    

       4.54 10*6/uL       

                                        4.35-5.85        

 

                    Venous blood hemoglobin measurement (mass/volume)           

13.9 g/dL           

11.5-16.0        

 

                    Blood hematocrit (volume fraction)           42 %           

     35-52        

 

                    Automated erythrocyte mean corpuscular volume           92 [

z_us]           

80-99        

 

                                        Automated erythrocyte mean corpuscular h

emoglobin (mass per erythrocyte)        

                          31 pg                     25-34        

 

                                        Automated erythrocyte mean corpuscular h

emoglobin concentration measurement 

(mass/volume)             33 g/dL                   32-36        

 

                    Automated erythrocyte distribution width ratio           13.

8 %              10.0-

14.5        

 

                    Automated blood platelet count (count/volume)           210 

10*3/uL           

130-400        

 

                          Automated blood platelet mean volume measurement      

     10.9 [foz_us]        

                                        7.4-10.4        

 

                    Automated blood neutrophils/100 leukocytes           55 %   

             42-75       

 

 

                    Automated blood lymphocytes/100 leukocytes           30 %   

             12-44       

 

 

                    Blood monocytes/100 leukocytes           13 %               

 0-12        

 

                    Automated blood eosinophils/100 leukocytes           3 %    

             0-10        

 

                    Automated blood basophils/100 leukocytes           1 %      

           0-10        

 

                    Blood neutrophils automated count (number/volume)           

3.2 10*3            

1.8-7.8        

 

                    Blood lymphocytes automated count (number/volume)           

1.8 10*3            

1.0-4.0        

 

                    Blood monocytes automated count (number/volume)           0.

7 10*3            

0.0-1.0        

 

                    Automated eosinophil count           0.2 10*3/uL           0

.0-0.3        

 

                    Automated blood basophil count (count/volume)           0.0 

10*3/uL           

0.0-0.1        

 

                                        PT panel in platelet poor plasma by coag

ulation assay - 20 17:00         

 

                          Prothrombin time (PT) in platelet poor plasma by coagu

lation assay           

12.4 s                                  12.2-14.7        

 

                          INR in platelet poor plasma or blood by coagulation as

say           0.9         

                                        0.8-1.4        

 

                                        Activated partial thromboplastin time (a

PTT) in platelet poor plasma 

bycoagulation assay - 20 17:00         

 

                                        Activated partial thromboplastin time (a

PTT) in platelet poor plasma 

bycoagulation assay           27 s                      24-35        

 

                                        Comprehensive metabolic panel - 20

 17:00         

 

                          Serum or plasma sodium measurement (moles/volume)     

      139 mmol/L          

                                        135-145        

 

                          Serum or plasma potassium measurement (moles/volume)  

         4.5 mmol/L       

                                        3.6-5.0        

 

                          Serum or plasma chloride measurement (moles/volume)   

        103 mmol/L        

                                                

 

                    Carbon dioxide           23 mmol/L           21-32        

 

                          Serum or plasma anion gap determination (moles/volume)

           13 mmol/L      

                                        5-14        

 

                          Serum or plasma urea nitrogen measurement (mass/volume

)           14 mg/dL      

                                        7-18        

 

                          Serum or plasma creatinine measurement (mass/volume)  

         0.74 mg/dL       

                                        0.60-1.30        

 

                    Serum or plasma urea nitrogen/creatinine mass ratio         

  19                  NRG 

       

 

                                        Serum or plasma creatinine measurement w

ith calculation of estimated glomerular 

filtration rate           >                         NRG        

 

                    Serum or plasma glucose measurement (mass/volume)           

93 mg/dL            

        

 

                    Serum or plasma calcium measurement (mass/volume)           

9.3 mg/dL           

8.5-10.1        

 

                          Serum or plasma total bilirubin measurement (mass/volu

me)           0.5 mg/dL   

                                        0.1-1.0        

 

                                        Serum or plasma alkaline phosphatase aretha

surement (enzymatic activity/volume)    

                          102 U/L                           

 

                                        Serum or plasma aspartate aminotransfera

se measurement (enzymatic 

activity/volume)           36 U/L                    5-34        

 

                                        Serum or plasma alanine aminotransferase

 measurement (enzymatic activity/volume)

                          21 U/L                    0-55        

 

                    Serum or plasma protein measurement (mass/volume)           

7.4 g/dL            

6.4-8.2        

 

                    Serum or plasma albumin measurement (mass/volume)           

4.1 g/dL            

3.2-4.5        

 

                    CALCIUM CORRECTED           9.2 mg/dL           8.5-10.1    

    

 

                                        Magnesium - 20 17:00         

 

                    Magnesium           2.2 mg/dL           1.6-2.4        

 

                                        Serum or plasma creatine kinase measurem

ent (enzymatic activity/volume) - 

20 17:00         

 

                                        Serum or plasma creatine kinase measurem

ent (enzymatic activity/volume)         

                          43 U/L                            

 

                                        Serum or plasma creatine kinase MB measu

rement (enzymatic activity/volume) - 

20 17:00         

 

                                        Serum or plasma creatine kinase MB measu

rement (enzymatic activity/volume)      

                          1.1 ng/mL                 <6.6        

 

                                        Serum or plasma troponin i.cardiac measu

rement (mass/volume) - 20 17:00   

      

 

                          Serum or plasma troponin i.cardiac measurement (mass/v

olume)           < ng/mL  

                                        <0.028        

 

                                        Myoglobin, serum - 20 17:00       

  

 

                    Myoglobin, serum           53.3 ng/mL           10.0-92.0   

     

 

                                        Serum or plasma thyrotropin measurement 

by detection limit <=0.05 miu/l 

(units/volume) - 20 17:00         

 

                                        Serum or plasma thyrotropin measurement 

by detection limit <=0.05 miu/l 

(units/volume)            1.78 u[iU]/mL             0.35-4.94        

 

                                        Serum or plasma lithium measurement (mol

es/volume) - 20 17:00         

 

                    BNP PT              91.6 pg/mL           <100.0        

 

                                        Serum or plasma troponin i.cardiac measu

rement (mass/volume) - 20 22:58   

      

 

                          Serum or plasma troponin i.cardiac measurement (mass/v

olume)           < ng/mL  

                                        <0.028        

 

                                        Complete blood count (CBC) with automate

d white blood cell (WBC) differential - 

20 03:04         

 

                          Blood leukocytes automated count (number/volume)      

     6.1 10*3/uL          

                                        4.3-11.0        

 

                          Blood erythrocytes automated count (number/volume)    

       4.08 10*6/uL       

                                        4.35-5.85        

 

                    Venous blood hemoglobin measurement (mass/volume)           

12.4 g/dL           

11.5-16.0        

 

                    Blood hematocrit (volume fraction)           38 %           

     35-52        

 

                    Automated erythrocyte mean corpuscular volume           92 [

foz_us]           

80-99        

 

                                        Automated erythrocyte mean corpuscular h

emoglobin (mass per erythrocyte)        

                          30 pg                     25-34        

 

                                        Automated erythrocyte mean corpuscular h

emoglobin concentration measurement 

(mass/volume)             33 g/dL                   32-36        

 

                    Automated erythrocyte distribution width ratio           13.

6 %              10.0-

14.5        

 

                    Automated blood platelet count (count/volume)           203 

10*3/uL           

130-400        

 

                          Automated blood platelet mean volume measurement      

     11.0 [foz_us]        

                                        7.4-10.4        

 

                    Automated blood neutrophils/100 leukocytes           57 %   

             42-75       

 

 

                    Automated blood lymphocytes/100 leukocytes           26 %   

             12-44       

 

 

                    Blood monocytes/100 leukocytes           13 %               

 0-12        

 

                    Automated blood eosinophils/100 leukocytes           4 %    

             0-10        

 

                    Automated blood basophils/100 leukocytes           1 %      

           0-10        

 

                    Blood neutrophils automated count (number/volume)           

3.4 10*3            

1.8-7.8        

 

                    Blood lymphocytes automated count (number/volume)           

1.6 10*3            

1.0-4.0        

 

                    Blood monocytes automated count (number/volume)           0.

8 10*3            

0.0-1.0        

 

                    Automated eosinophil count           0.3 10*3/uL           0

.0-0.3        

 

                    Automated blood basophil count (count/volume)           0.0 

10*3/uL           

0.0-0.1        

 

                                        Comprehensive metabolic panel - 20

 03:04         

 

                          Serum or plasma sodium measurement (moles/volume)     

      141 mmol/L          

                                        135-145        

 

                          Serum or plasma potassium measurement (moles/volume)  

         3.7 mmol/L       

                                        3.6-5.0        

 

                          Serum or plasma chloride measurement (moles/volume)   

        107 mmol/L        

                                                

 

                    Carbon dioxide           24 mmol/L           21-32        

 

                          Serum or plasma anion gap determination (moles/volume)

           10 mmol/L      

                                        5-14        

 

                          Serum or plasma urea nitrogen measurement (mass/volume

)           10 mg/dL      

                                        7-18        

 

                          Serum or plasma creatinine measurement (mass/volume)  

         0.75 mg/dL       

                                        0.60-1.30        

 

                    Serum or plasma urea nitrogen/creatinine mass ratio         

  13                  NRG 

       

 

                                        Serum or plasma creatinine measurement w

ith calculation of estimated glomerular 

filtration rate           >                         NRG        

 

                    Serum or plasma glucose measurement (mass/volume)           

92 mg/dL            

        

 

                    Serum or plasma calcium measurement (mass/volume)           

8.7 mg/dL           

8.5-10.1        

 

                          Serum or plasma total bilirubin measurement (mass/volu

me)           0.6 mg/dL   

                                        0.1-1.0        

 

                                        Serum or plasma alkaline phosphatase aretha

surement (enzymatic activity/volume)    

                          84 U/L                            

 

                                        Serum or plasma aspartate aminotransfera

se measurement (enzymatic 

activity/volume)           23 U/L                    5-34        

 

                                        Serum or plasma alanine aminotransferase

 measurement (enzymatic activity/volume)

                          15 U/L                    0-55        

 

                    Serum or plasma protein measurement (mass/volume)           

6.1 g/dL            

6.4-8.2        

 

                    Serum or plasma albumin measurement (mass/volume)           

3.5 g/dL            

3.2-4.5        

 

                    CALCIUM CORRECTED           9.1 mg/dL           8.5-10.1    

    



                                          



Encounters

      



                ACCT No.           Visit Date/Time           Discharge          

 Status         

             Pt. Type           Provider           Facility           Loc./Unit 

          

Complaint        

 

                    [id number]           2020 18:05:00           

020 08:50:00        

                DIS             Inpatient           SARWAT FITZPATRICK DO 

[facility]           CSD                       PAPLITATIONS,DY

SPNEA,PEDAL 

EDEMA,LBBB        

 

                    [id number]           2020 10:27:00           

020 23:59:59        

                CLS             Outpatient           SARWAT FITZPATRICK DO 

[facility]           RAD                       TRANSIENT ISCHE

KATIE ATTACK  

      

 

                    [id number]           2018 10:15:00           

018 23:59:59        

                CLS             Outpatient           SARWAT FITZPATRICK DO     

      [facility] 

[facility]           RAD                       L LEG NEUROPATH

Y        

 

                    [id number]           12/15/2017 08:38:00           12/15/2

017 23:59:59        

                CLS             Preadmit           SARWAT FITZPATRICK DO       

    [facility]           RAD                       SIATICA        

 

                    [id number]           2017 12:30:00           

017 23:59:59        

                CLS             Outpatient           FITZPATRICKNIKI HERNANDEZ ARNP  

         [facility] 

[facility]           RAD                       RUQ PAIN       

 

 

                    [id number]           10/05/2016 12:35:00           10/05/2

016 23:59:59        

                CLS             Outpatient           SARWAT FITZPATRICK DO     

      [facility] 

[facility]           RT                        J44.9        

 

                    [id number]           2016 13:40:00           

016 23:59:59        

                CLS             Outpatient           SARWAT FITZPATRICK DO     

      [facility] 

[facility]           RAD                       R10.13        

 

                    [id number]           2016 11:39:00           

016 23:59:59        

                CLS             Outpatient           FITZPATRICKALEENA HERNANDEZIA L ARNP  

         [facility] 

[facility]           RAD                       CLAUDICATION,DI

ZZINESS, CAD

        

 

                    [id number]           2015 11:06:00           

015 23:59:59        

                CLS             Outpatient           FITZPATRICKALEENA HERNANDEZIA L ARNP  

         [facility] 

[facility]           RAD                       COUGH        

 

                    [id number]           2015 12:33:00           

015 23:59:59        

                CLS             Outpatient           FITZPATRICKVIKY HERNANDEZRICIA L ARNP  

         [facility] 

[facility]           CARD                      CHEST PAIN     

   

 

                    [id number]           2015 08:25:00           

015 23:59:59        

                CLS             Outpatient           DEFFENBAUGH JOVANNA    

       [facility] 

[facility]           LAB                       ABD PAIN,CHG IN

 BOWEL 

HABITS,[place]ALA[place],DIST OF [place]        

 

                    [id number]           2015 08:09:00           

015 23:59:59        

                CLS             Outpatient           FITZPATRICKALEENA MASTERSONIA L ARNP  

         [facility] 

[facility]           LAB                       ELEVATED GLUCOS

E        

 

                    [id number]           2015 13:28:00           

015 23:59:59        

                CLS             Outpatient           NIKI FITZPATRICK  

         [facility] 

[facility]           RAD                       RIGHT RENAL PEL

VIS PAIN    

    

 

                    [id number]           10/30/2014 14:43:00           10/30/2

014 23:59:59        

                CLS             Outpatient           NIKI FITZPATRICK  

         [facility] 

[facility]           RAD                       THYROMEGALY/ DI

FFICULTY 

SWALLOWING        

 

                    [id number]           2014 15:00:00           

014 16:45:00        

                DIS             Inpatient           SARWAT FITZPATRICK DO      

     [facility] 

[facility]           4TH                       PNEUMONIA      

  

 

                    [id number]           2014 10:28:00           

014 23:59:59        

                CLS             Outpatient           SARWAT FITZPATRICK DO     

      [facility] 

[facility]           RAD                       PNEUMONIA,FEVER

        

 

                    [id number]           2014 15:47:00           

014 23:59:59        

                CLS             Outpatient           LAUREN BOND MD       

    [facility]           RAD                       MAY 2012 NODULE ON CXR

        

 

                    [id number]           2014 15:32:00           

014 23:59:59        

                CLS             Outpatient           NIKI FITZPATRICK  

         [facility] 

[facility]           RAD                       URINARY FREQUEN

CY        

 

                    [id number]           2013 12:18:00           

013 23:59:59        

                CLS             Outpatient           NIKI FITZPATRICK  

         [facility] 

[facility]           RAD                       THYROID NODULE 

RECHECK     

   

 

                    [id number]           2013 10:59:00           

013 22:00:00        

                DIS             Inpatient           SARWAT FITZPATRICK DO      

     [facility] 

[facility]           CSD                       CHEST PAIN     

   

 

                    [id number]           2013 13:50:00           

013 23:59:59        

                CLS             Outpatient           NIKI CAM   

        [facility] 

[facility]           RAD                       NECK   RT SHOUL

COLLIN PAIN    

    

 

             [id number]           2020 12:30:00                        P

EN           

Preadmit                  AUBRIE VAUGHN MD           [facility]

                          CARD                      PARKS,PALPITATIONS,LBBB       

 

 

             [id number]           2020 10:00:00                        P

EN           

Preadmit                  RODERICK MD, AUBRIE ZAPATA           [facility]                      PARKS,PALPITATION,LBBB        

 

             [id number]           2015 13:29:00                         

            

Document Registration                                                           

         

 

             [id number]           2015 13:28:00                         

            

Document Registration                                                           

         

 

             [id number]           2013 09:09:00                         

            

Document Registration                                                           

         

 

             [id number]           10/10/2012 11:02:00                         

            

Document Registration                                                           

         

 

             [id number]           10/09/2012 07:10:00                         

            

Document Registration                                                           

         

 

             [id number]           2012 10:37:00                         

            

Document Registration                                                           

         

 

             [id number]           2012 17:16:00                         

            

Document Registration                                                           

         

 

             [id number]           2012 08:55:00                         

            

Document Registration                                                           

         

 

             [id number]           2012 09:21:00                         

            

Document Registration                                                           

         

 

             [id number]           2012 11:52:00                         

            

Document Registration                                                           

         

 

             [id number]           2009 08:51:00                         

            

Document Registration                                                           

         

 

             [id number]           2015 12:33:30                        ACT   

        

Document Registration

## 2020-03-13 NOTE — XMS REPORT
Encounter Summary

                             Created on: 2020



Alvina Charles

External Reference #: [id number]

: 1935

Sex: Female



Demographics





                          Address                   [address] [place]  [address]

 

                          Home Phone                [phone]

 

                          Preferred Language        English

 

                          Marital Status            

 

                          Latter day Affiliation     1013

 

                          Race                      White

 

                          Ethnic Group              Not  or 





Author





                          Author                    [facility]

 

                          Organization              [facility]

 

                          Address                   Unknown

 

                          Phone                     Unavailable







Support





                Name            Relationship    Address         Phone

 

                Krista Honeycutt   [employer]            Unknown         [phone]







Care Team Providers





                    Care Team Member Name Role                Phone

 

                    Payam Hutchison   PCP                 [phone]







Reason for Visit

* 



 



                           Reason                    Comments

 

 



                                         Lab results 









Encounter Details





                          Care Team                 Description



                     Date                Type                Department  

 

                                        



Chiara Alberto RN               Lab results



                     2019          Telephone           [facility]  



                                         [facility]  



                                         [facility]  



                                         [address]  



                                         [address]  



                                         [place], [place] [address] [phone]  







Social History





                                        Date



                 Tobacco Use     Types           Packs/Day       Years Used 

 

                                         



                                         Never Smoker    

 

    



                                         Smokeless Tobacco: Never   



                                         Used   







                    Drinks/Week         oz/Week             Comments



                                         Alcohol Use   

 

                                                             



                                         No   







 



                           Sex Assigned at Birth     Date Recorded

 

 



                                         Not on file 







                                        Industry



                           Job Start Date            Occupation 

 

                                        Not on file



                           Not on file               Not on file 







                                        Travel End



                           Travel History            Travel Start 

 





                                         No recent travel history available.



documented as of this encounter



Miscellaneous Notes

* Telephone Encounter - Chiara Alberto RN - 2019  3:20 PM CDT



Reviewed results of most recent lab and Dr. Muhammad's recommendations with Alvina. Marion
e verbalized understanding and is in agreement with plan. Forwarded results to p
cp.  Pt has has a call into pcp and will address when she talks to them as well.




Electronically signed by Chiara Alberto RN at 2019  3:21 PM CDT

* Telephone Encounter - Chiara Alberto RN - 2019  3:15 PM CDT



----- Message from Mariam Nguyen MD sent at 2019  8:46 AM CDT -----

Please send results to her PCP or who is managing her thyroid med; may have role
in her sxs



Electronically signed by Chiara Alberto RN at 2019  3:15 PM CDT

documented in this encounter



Plan of Treatment





Not on filedocumented as of this encounter



Visit Diagnoses

Not on filedocumented in this encounter

## 2020-03-13 NOTE — XMS REPORT
Encounter Summary

                             Created on: 2020



Alvina Charles

External Reference #: [id number]

: 1935

Sex: Female



Demographics





                          Address                   [address] [place]  [address]

 

                          Home Phone                [phone]

 

                          Preferred Language        English

 

                          Marital Status            

 

                          Buddhism Affiliation     1013

 

                          Race                      White

 

                          Ethnic Group              Not  or 





Author





                          Author                    [facility]

 

                          Organization              [facility]

 

                          Address                   Unknown

 

                          Phone                     Unavailable







Support





                Name            Relationship    Address         Phone

 

                Krista Honeycutt   [employer]            Unknown         [phone]







Care Team Providers





                    Care Team Member Name Role                Phone

 

                    Payam Hutchison   PCP                 [phone]







Encounter Details





                          Care Team                 Description



                     Date                Type                Department  

 

                                        



Soo Hughes MD



[address]



[address]



[place] [address] [phone] (Fax)                       



                     2018          Documentation       [facility]  



                                         [facility]  



                                         [facility]  



                                         [address]  



                                         [address]  



                                         [place] [id number] [phone]  







Social History





                                        Date



                 Tobacco Use     Types           Packs/Day       Years Used 

 

                                         



                                         Never Smoker    

 

    



                                         Smokeless Tobacco: Never   



                                         Used   







                    Drinks/Week         oz/Week             Comments



                                         Alcohol Use   

 

                                                             



                                         No   







 



                           Sex Assigned at Birth     Date Recorded

 

 



                                         Not on file 







                                        Industry



                           Job Start Date            Occupation 

 

                                        Not on file



                           Not on file               Not on file 







                                        Travel End



                           Travel History            Travel Start 

 





                                         No recent travel history available.



documented as of this encounter



Plan of Treatment





Not on filedocumented as of this encounter



Visit Diagnoses

Not on filedocumented in this encounter

## 2020-03-13 NOTE — XMS REPORT
Encounter Summary

                             Created on: 2020



Alvina Charles

External Reference #: [id number]

: 1935

Sex: Female



Demographics





                          Address                   [address] [place]  [address]

 

                          Home Phone                [phone]

 

                          Preferred Language        English

 

                          Marital Status            

 

                          Orthodoxy Affiliation     1013

 

                          Race                      White

 

                          Ethnic Group              Not  or 





Author





                          Author                    [facility]

 

                          Organization              [facility]

 

                          Address                   Unknown

 

                          Phone                     Unavailable







Support





                Name            Relationship    Address         Phone

 

                Krista Honeycutt   [employer]            Unknown         [phone]







Care Team Providers





                    Care Team Member Name Role                Phone

 

                    Payam Hutchison   PCP                 [phone]







Reason for Referral

* Cardiac Cath (Routine)



                          Referred By Contact       Referred To Contact



                 Status          Reason          Specialty       Diagnoses /  



                                         Procedures  

 

                                        



[facility]



[address]



[address] 



[place] [address]



Phone: [phone]



Fax: [phone]                       







                           Closed                    Procedures  



                                         Right Heart  



                                         Catheterization  











Encounter Details





                          Care Team                 Description



                     Date                Type                Department  

 

                                        



Kristen Fiore RN                     



                     2017          Abstract            [facility]  



                                         [facility]  



                                         [facility]  



                                         [address]  



                                         [address]   



                                         [place], [place] [id number] [phone]  







Social History





                                        Date



                 Tobacco Use     Types           Packs/Day       Years Used 

 

                                         



                                         Never Smoker    

 

    



                                         Smokeless Tobacco: Never   



                                         Used   







                    Drinks/Week         oz/Week             Comments



                                         Alcohol Use   

 

                                                             



                                         No   







 



                           Sex Assigned at Birth     Date Recorded

 

 



                                         Not on file 







                                        Industry



                           Job Start Date            Occupation 

 

                                        Not on file



                           Not on file               Not on file 







                                        Travel End



                           Travel History            Travel Start 

 





                                         No recent travel history available.



documented as of this encounter



Plan of Treatment





Not on filedocumented as of this encounter



Procedures





                                        Comments



                 Procedure Name  Priority        Date/Time       Associated Diag

nosis 

 

                                        



Results for this procedure are in the results section.



                     RIGHT HEART         Routine             2016  



                           CATHETERIZATION           9:18 PM CDT  

 

                                        



Results for this procedure are in the results section.



                     LIPID PANEL         Routine             2016  

 

                                        



Results for this procedure are in the results section.



                     CV US CAROTID OUTSIDE  Routine             2016  



                           RECORD                    9:28 PM CDT  

 

                                        



Results for this procedure are in the results section.



                     ECHO OUTSIDE RECORD  Routine             2015  



                                         9:36 PM CDT  



documented in this encounter



Results

* Right Heart Catheterization (2016  9:18 PM CDT)



 



                           Impressions               Performed At

 

 



                                         RA: 7. RV: 41/9. PA: 38/11/20. Wedge: 1

5 with V-waves to 25. LV: 143/13, 



                                         Ascending aortic pressure: 145/61. CO/C

I (Sanna): 3.6/2.1. LVEF 55%. ([facility]) 





* Lipid Panel (2016)



    



              Component    Value        Ref Range    Performed At  Pathologist



                                         Signature

 

    



                     Triglycerides       80                  40 - 160 mg/dL  

 

    



                           HDL Cholesterol           35 - 70 mg/dL  

 

    



                     LDL Cholesterol     102                 mg/dL  

 

    



                                         Cholesterol/HDL    



                                         Ratio    

 

    



                                         LDL    



                                         INTERPRETATION    

 

    



                                         Non-HDL    



                                         Cholesterol    

 

    



                           Cholesterol               158   











                                         Specimen

 





                                         Blood





* CV US Carotid Outside Record (2016  9:28 PM CDT)



 



                           Impressions               Performed At

 

 



                                         Mild atherosclerotic disease involving 

both carotid systems. No evidence for 



                                         hemodynamically significant ([facility]

i) 





* Echo Outside Record (2015  9:36 PM CDT)



    



              Component    Value        Ref Range    Performed At  Pathologist



                                         Signature

 

    



                                         Ejection    



                                         Fraction    







 



                           Impressions               Performed At

 

 



                                         Normal LVEF. Grade III diastolic dysfun

ction. 1+MR. Mild dilatation of RV and 

RA 



                                         with 1/2+ TR. Moderate pulmonair hypert

ension -40mmHg ([facility] ) 





documented in this encounter



Visit Diagnoses









                                         Diagnosis

 





                                         Multiple sclerosis (HCC)



                                         Multiple sclerosis



documented in this encounter

## 2020-03-13 NOTE — XMS REPORT
Encounter Summary

                             Created on: 2020



Alvina Charles

External Reference #: [id number]

: 1935

Sex: Female



Demographics





                          Address                   [address] [place]  [address]

 

                          Home Phone                [phone]

 

                          Preferred Language        English

 

                          Marital Status            

 

                          Voodoo Affiliation     [id number]

 

                          Race                      White

 

                          Ethnic Group              Not  or 





Author





                          Author                    [facility]

 

                          Organization              [facility]

 

                          Address                   Unknown

 

                          Phone                     Unavailable







Support





                Name            Relationship    Address         Phone

 

                Krista Honeycutt   [employer]            Unknown         [phone]







Care Team Providers





                    Care Team Member Name Role                Phone

 

                    Payam Hutchison   PCP                 [phone]







Encounter Details





                          Care Team                 Description



                     Date                Type                Department  

 

                                        



Adolfo Murdock RN                     



                     2016          Abstract            [facility]  



                                         [facility]  



                                         [facility]  



                                         [address]  



                                         [address]  



                                         [place] [address]  



                                         [phone]  







Social History





                                        Date



                 Tobacco Use     Types           Packs/Day       Years Used 

 

                                         



                                         Never Assessed    







 



                           Sex Assigned at Birth     Date Recorded

 

 



                                         Not on file 







                                        Industry



                           Job Start Date            Occupation 

 

                                        Not on file



                           Not on file               Not on file 







                                        Travel End



                           Travel History            Travel Start 

 





                                         No recent travel history available.



documented as of this encounter



Plan of Treatment





Not on filedocumented as of this encounter



Visit Diagnoses

Not on filedocumented in this encounter

## 2020-03-13 NOTE — XMS REPORT
Continuity of Care Document

                             Created on: 2020



RAN BANSAL

External Reference #: [id number]

: 1935

Sex: Female



Demographics





                          Address                   [address] [place]  [address]

 

                          Home Phone                [phone] x

 

                          Preferred Language        Unknown

 

                          Marital Status            Unknown

 

                          Religion Affiliation     Unknown

 

                          Race                      Unknown

 

                          Ethnic Group              Unknown





Author





                          Organization              Unknown

 

                          Address                   Unknown

 

                          Phone                     Unavailable



              



Allergies

      



             Active           Description           Code           Type         

  Severity   

                Reaction           Onset           Reported/Identified          

 

Relationship to Patient                 Clinical Status        

 

             Yes           ANTIBIOTICS ALL           ANTIBIOTICS ALL            

            

Unknown           N/A                        2014                         

  

     

 

           Yes           MIRANDA           MIRANDA                       Unknown  

         

N/A                             2014                                    

 

           Yes           CODEINE           CODEINE                       Unknown

           

N/A                             2014                                    

 

             Yes           CORTISONE           CORTISONE                        

Unknown       

             N/A                        2014                              

   

 

           Yes           DYE           DYE                       Unknown        

   N/A     

                                2014                                    

 

             Yes           egg           [id number]           Drug Allergy      

     Unknown 

             N/A                        2014                              

   

 

             Yes           latex           [id number]           Drug Allergy    

       

Unknown           N/A                        2014                         

  

     

 

             Yes           milk           [id number]           Drug Allergy     

      Unknown

             N/A                        2014                              

   



                                



Medications

      



There is no data.                  



Problems

      



             Date Dx Coded           Attending           Type           Code    

       

Diagnosis                               Diagnosed By        

 

             2012                        Ot           780.79           OTH

 MALAISE 

FATIGUE                                          

 

             2012                        Ot           787.02           DYAN

SEA ALONE     

                                                 

 

             2012                        Ot           791.9           ABN 

URINE FINDINGS

NEC                                              

 

                2013           SARWAT FITZPATRICK DO           Ot        

      244.9        

                          HYPOTHYROIDISM NOS                    

 

                2013           SARWAT FITZPATRICK DO           Ot        

      250.00       

                          DIAB FLAVIA WO COMPL, TYPE II OR UNSPEC TY              

      

 

                2013           SARWAT FITZPATRICK DO           Ot        

      401.9        

                          HYPERTENSION NOS                    

 

                2013           SARWAT FITZPATRICK DO           Ot        

      424.1        

                          AORTIC VALVE DISORDER                    

 

                2013           SARWAT FITZPATRICK DO           Ot        

      433.10       

                          CAROTID ARTERY OCCLUSION W O CEREBRAL IN              

      

 

                2013           SARWAT FITZPATRICK DO           Ot        

      786.50       

                          CHEST PAIN NOS                     

 

                2014           SARWAT FITZPATRICK DO           Ot        

      244.9        

                          HYPOTHYROIDISM NOS                    

 

                2014           SARWAT FITZPATRICK DO           Ot        

      276.51       

                          DEHYDRATION                        

 

                2014           SARWAT FITZPATRICK DO           Ot        

      424.1        

                          AORTIC VALVE DISORDER                    

 

                2014           SARWAT FITZPATRICK DO           Ot        

      458.0        

                          ORTHOSTATIC HYPOTENSION                    

 

                2014           SARWAT FITZPATRICK DO           Ot        

      482.9        

                          BACTERIAL PNEUMONIA NOS                    

 

                2014           NIKI FITZPATRICK ARNP           Ot     

         240.9     

                                                             

 

                2014           NIKI FITZPATRICK ARNP           Ot     

         787.20    

                                                             

 

           2015                       Ot           536.8                  

           

  

 

           2015                       Ot           789.01                 

           

   

 

           2015                       Ot           719.47                 

           

   

 

           2015                       Ot           729.5                  

           

  

 

           2015                       Ot           959.7                  

           

  

 

           2015                       Ot           E000.8                 

           

   

 

           2015                       Ot           E849.0                 

           

   

 

           2015                       Ot           E888.9                 

           

   

 

           2015                       Ot           785.1                  

           

  

 

           2015                       Ot           733.90                 

           

   

 

           2015                       Ot           244.9                  

           

  

 

           2015                       Ot           536.8                  

           

  

 

           2015                       Ot           786.50                 

           

   

 

           2015                       Ot           627.1                  

           

  

 

           2015                       Ot           789.01                 

           

   

 

                2015           DORINA NIKI WHARTON APRN           Ot      

        719.41     

                                                             

 

                2015           DORINANIKI APRN           Ot      

        723.1      

                                                             

 

                2015           NIKI FITZPATRICK ARNP           Ot     

         241.0     

                                                             

 

                2015           ALEENA FITZPATRICKIA DEMETRIUS ARNP           Ot     

         V67.9     

                                                             

 

                2015           VIKY FITZPATRICKRICIA L ARNP           Ot     

         788.41    

                                                             

 

                2015           VARUN BENZ, LAUREN MORENO           Ot          

    780.79         

                                                             

 

                2015           LAUREN BOND MD           Ot          

    793.11         

                                                             

 

                2015           SARWAT FITZPATRICK DO           Ot        

      486          

                                                             

 

                2015           SARWAT FITZPATRICK DO           Ot        

      780.60       

                                                             

 

                2015           NIKI FITZPATRICK L ARNP           Ot     

         240.9     

                                                             

 

                2015           VIKY FITZPATRICKRICIA L ARNP           Ot     

         787.20    

                                                             

 

                2015           VIKY FITZPATRICKRICIA L ARNP           Ot     

         789.09    

                                                             

 

                2015           VIKY FITZPATRICKRICIA L ARNP           Ot     

         789.09    

                                                             

 

                2015           VIKY FITZPATRICKRICIA L ARNP           Ot     

         786.50    

                                                             

 

                2015           ALEENA FITZPATRICKIA L ARNP           Ot     

         786.50    

                                                             

 

                2015           VIKY FITZPATRICKRICIA L ARNP           Ot     

         786.50    

                                                             

 

                2015           NIKI FITZPATRICK L ARNP           Ot     

         R05       

                                                             

 

                2016           FITZPATRICKNIKI MASTERSON L ARNP           Ot     

         I25.10    

                                                             

 

                2016           FITZPATRICKALEENA MASTERSONIA L ARNP           Ot     

         I65.23    

                                                             

 

                2016           FITZPATRICK, NIKI L ARNP           Ot     

         I73.9     

                                                             

 

                2016           FITZPATRICK, NIKI L ARNP           Ot     

         R42       

                                                             

 

                2016           FITZPATRICK, NIKI L ARNP           Ot     

         I25.10    

                                                             

 

                2016           FITZPATRICK, NIKI L ARNP           Ot     

         I65.23    

                                                             

 

                2016           FITZPATRICKALEENA MASTERSONIA L ARNP           Ot     

         I73.9     

                                                             

 

                2016           FITZPATRICK, NIKI L ARNP           Ot     

         R42       

                                                             

 

                2016           FITZPATRICK, NIKI L ARNP           Ot     

         I25.10    

                                                             

 

                2016           FITZPATRICK, NIKI L ARNP           Ot     

         I65.23    

                                                             

 

                2016           FITZPATRICKALEENAIA L ARNP           Ot     

         I73.9     

                                                             

 

                2016           FITZPATRICKALEENAIA L ARNP           Ot     

         R42       

                                                             

 

                2016           FITZPATRICKALEENA MASTERSONIA L ARNP           Ot     

         I25.10    

                                                             

 

                2016           FITZPATRICKALEENA MASTERSONIA L ARNP           Ot     

         I65.23    

                                                             

 

                2016           FITZPATRICKALEENAIA L ARNP           Ot     

         I73.9     

                                                             

 

                2016           FITZPATRICKVIKYNIKI L ARNP           Ot     

         R42       

                                                             

 

                08/15/2016           SARWAT FITZPATRICK DO           Ot        

      R10.13       

                          EPIGASTRIC PAIN                    

 

                08/15/2016           SARWAT FITZPATRICK DO           Ot        

      R19.03       

                          RIGHT LOWER QUADRANT ABDOMINAL SWELLING,              

      

 

                2016           SARWAT FITZPATRICK DO           Ot        

      R10.13       

                          EPIGASTRIC PAIN                    

 

                2016           SARWAT FITZPATRICK DO           Ot        

      R19.03       

                          RIGHT LOWER QUADRANT ABDOMINAL SWELLING,              

      

 

                2016           NIKI CAM APRN           Ot      

        719.41     

                          JOINT PAIN-SHLDER                    

 

                2016           NIKI CAM APRN           Ot      

        723.1      

                          CERVICALGIA                        

 

                2016           SARWAT FITZPATRICK DO           Ot        

      R10.13       

                          EPIGASTRIC PAIN                    

 

                2016           SARWAT FITZPATRICK DO           Ot        

      R19.03       

                          RIGHT LOWER QUADRANT ABDOMINAL SWELLING,              

      

 

             10/05/2016                        Ot           719.47           TORRES

NT PAIN-ANKLE 

                                                 

 

             10/05/2016                        Ot           729.5           PAIN

 IN LIMB      

                                                 

 

             10/05/2016                        Ot           959.7           LOWE

R LEG INJURY 

NOS                                              

 

             10/05/2016                        Ot           E000.8           OTH

ER EXTERNAL 

CAUSE STATUS                                     

 

             10/05/2016                        Ot           E849.0           ACC

IDENT IN HOME 

                                                 

 

             10/05/2016                        Ot           E888.9           FAL

L NOS         

                                                 

 

             10/05/2016                        Ot           785.1           PALP

ITATIONS      

                                                 

 

             10/05/2016                        Ot           733.90           BON

E   CARTILAGE 

DIS NOS                                          

 

             10/05/2016                        Ot           244.9           HYPO

THYROIDISM NOS

                                                 

 

             10/05/2016                        Ot           536.8           STOM

ACH FUNCTION 

DIS NEC                                          

 

             10/05/2016                        Ot           786.50           MELVINA

ST PAIN NOS   

                                                 

 

             10/05/2016                        Ot           627.1           POST

MENOPAUSAL 

BLEEDING                                         

 

             10/05/2016                        Ot           789.01           ABD

OMINAL PAIN, 

RIGHT UPPER QUADRANT                             

 

                10/05/2016           RIDINGS, NIKI C APRN           Ot      

        719.41     

                          JOINT PAIN-SHLDER                    

 

                10/05/2016           RIDINGS, NIKI C APRN           Ot      

        723.1      

                          CERVICALGIA                        

 

                10/05/2016           NIKI FITZPATRICK L ARNP           Ot     

         241.0     

                          NONTOX UNINODULAR GOITER                    

 

                10/05/2016           NIKI FITZPATRICK L ARNP           Ot     

         V67.9     

                          FOLLOW-UP EXAM NOS                    

 

                10/05/2016           NIKI FITZPATRICK ARNP           Ot     

         788.41    

                          URINARY FREQUENCY                    

 

                10/05/2016           VARUN BENZ, LAUREN MORENO           Ot          

    780.79         

                          OTH MALAISE FATIGUE                    

 

                10/05/2016           VARUN BENZ, LAUREN MORENO           Ot          

    793.11         

                          SOLITARY PULMONARY NODULE                    

 

                10/05/2016           SARWAT FITZPATRICK DO           Ot        

      486          

                          PNEUMONIA, ORGANISM NOS                    

 

                10/05/2016           SARWAT FITZPATRICK DO           Ot        

      780.60       

                          FEVER, UNSPECIFIED                    

 

                10/05/2016           NIKI FITZPATRICK L ARNP           Ot     

         R05       

                          COUGH                              

 

                10/05/2016           NIKI FITZPATRICK L ARNP           Ot     

         240.9     

                          GOITER NOS                         

 

                10/05/2016           ALEENA FITZPATRICKIA L ARNP           Ot     

         787.20    

                          DYSPHAGIA, UNSPECIFIED                    

 

                10/05/2016           ALEENA FITZPATRICKIA L ARNP           Ot     

         789.09    

                          ABDOMINAL PAIN, OTHER SPECIFIED SITE                  

  

 

                10/05/2016           NIKI FITZPATRICK L ARNP           Ot     

         790.29    

                          OTHER ABNORMAL GLUCOSE                    

 

                10/05/2016           JOVANNA BARRY DO           Ot       

       527.7       

                          SALIVARY SECRETION DIS                    

 

                10/05/2016           JOVANNA BARRY DO           Ot       

       780.79      

                          OTH MALAISE FATIGUE                    

 

                10/05/2016           JOVANNA BARRY DO           Ot       

       787.99      

                          OTHER GI SYSTEM SYMPTOMS                    

 

                10/05/2016           JOVANNA BARRY DO           Ot       

       789.06      

                          ABDOMINAL PAIN, EPIGASTRIC                    

 

                10/05/2016           NIKI FITZPATRICKP           Ot     

         786.50    

                          CHEST PAIN NOS                     

 

                10/05/2016           NIKI FITZPATRICK ARNP           Ot     

         I25.10    

                          ATHSCL HEART DISEASE OF NATIVE CORONARY               

      

 

                10/05/2016           NIKI FITZPATRICK ARNP           Ot     

         I65.23    

                          OCCLUSION AND STENOSIS OF BILATERAL TERRAZAS              

      

 

                10/05/2016           NIKI FITZPATRICK ARNP           Ot     

         I73.9     

                          PERIPHERAL VASCULAR DISEASE, UNSPECIFIED              

      

 

                10/05/2016           NIKI FITZPATRICKP           Ot     

         R42       

                          DIZZINESS AND GIDDINESS                    

 

                10/05/2016           SARWAT FITZPATRICK DO           Ot        

      R10.13       

                          EPIGASTRIC PAIN                    

 

                10/05/2016           SARWAT FITZPATRICK DO           Ot        

      R19.03       

                          RIGHT LOWER QUADRANT ABDOMINAL SWELLING,              

      

 

                10/06/2016           NIKI FITZPATRICK ARNP           Ot     

         I25.10    

                          ATHSCL HEART DISEASE OF NATIVE CORONARY               

      

 

                10/06/2016           NIKI FITZPATRICK ARNP           Ot     

         I65.23    

                          OCCLUSION AND STENOSIS OF BILATERAL TERRAZAS              

      

 

                10/06/2016           ALEENA FITZPATRICKIA DEMETRIUS ARNP           Ot     

         I73.9     

                          PERIPHERAL VASCULAR DISEASE, UNSPECIFIED              

      

 

                10/06/2016           NIKI FITZPATRICKP           Ot     

         R42       

                          DIZZINESS AND GIDDINESS                    

 

                10/06/2016           SARWAT FITZPATRICK DO           Ot        

      R10.13       

                          EPIGASTRIC PAIN                    

 

                10/06/2016           SARWAT FITZPATRICK DO           Ot        

      R19.03       

                          RIGHT LOWER QUADRANT ABDOMINAL SWELLING,              

      

 

                10/07/2016           SARAWT FITZPATRICK DO           Ot        

      J44.9        

                          CHRONIC OBSTRUCTIVE PULMONARY DISEASE, U              

      

 

                10/26/2016           SARWAT FITZPATRICK DO           Ot        

      J44.9        

                          CHRONIC OBSTRUCTIVE PULMONARY DISEASE, U              

      

 

                2017           NIKI FITZPATRICKP           Ot     

         R10.11    

                          RIGHT UPPER QUADRANT PAIN                    

 

                2018           NIKI CAM APRN           Ot      

        719.41     

                          JOINT PAIN-SHLDER                    

 

                2018           NIKI CAM APRN           Ot      

        723.1      

                          CERVICALGIA                        

 

                2018           NIKI FITZPATRICK ARNP           Ot     

         241.0     

                          NONTOX UNINODULAR GOITER                    

 

                2018           NIKI FITZPATRICK ARNP           Ot     

         V67.9     

                          FOLLOW-UP EXAM NOS                    

 

                2018           AMARI NIKI ROMO ARNP           Ot     

         788.41    

                          URINARY FREQUENCY                    

 

                2018           LAUREN BOND MD           Ot          

    780.79         

                          OTH MALAISE FATIGUE                    

 

                2018           LAUREN BOND MD           Ot          

    793.11         

                          SOLITARY PULMONARY NODULE                    

 

                2018           SARWAT FITZPATRICK DO           Ot        

      486          

                          PNEUMONIA, ORGANISM NOS                    

 

                2018           SARWAT FITZPATRICK DO           Ot        

      780.60       

                          FEVER, UNSPECIFIED                    

 

                2018           NIKI FITZPATRICK ARNP           Ot     

         R05       

                          COUGH                              

 

                2018           NIKI FITZPATRICK ARNP           Ot     

         240.9     

                          GOITER NOS                         

 

                2018           NIKI FITZPATRICK ARNP           Ot     

         787.20    

                          DYSPHAGIA, UNSPECIFIED                    

 

                2018           NIKI FITZPATRICK ARNP           Ot     

         789.09    

                          ABDOMINAL PAIN, OTHER SPECIFIED SITE                  

  

 

                2018           NIKI FITZPATRICK ARNP           Ot     

         790.29    

                          OTHER ABNORMAL GLUCOSE                    

 

                2018           JOVANNA BARRY DO           Ot       

       527.7       

                          SALIVARY SECRETION DIS                    

 

                2018           JOVANNA BARRY DO           Ot       

       780.79      

                          OTH MALAISE FATIGUE                    

 

                2018           JOVANNA BARRY DO           Ot       

       787.99      

                          OTHER GI SYSTEM SYMPTOMS                    

 

                2018           JOVANNA BARRY DO           Ot       

       789.06      

                          ABDOMINAL PAIN, EPIGASTRIC                    

 

                2018           NIKI FITZPATRICK ARNP           Ot     

         786.50    

                          CHEST PAIN NOS                     

 

                2018           NIKI FITZPATRICK ARNP           Ot     

         I25.10    

                          ATHSCL HEART DISEASE OF NATIVE CORONARY               

      

 

                2018           NIKI FITZPATRICK L ARNP           Ot     

         I65.23    

                          OCCLUSION AND STENOSIS OF BILATERAL TERRAZAS              

      

 

                2018           NIKI FITZPATRICK L ARNP           Ot     

         I73.9     

                          PERIPHERAL VASCULAR DISEASE, UNSPECIFIED              

      

 

                2018           NIKI FITZPATRICK L ARNP           Ot     

         R42       

                          DIZZINESS AND GIDDINESS                    

 

                2018           SARWAT FITZPATRICK DO           Ot        

      R10.13       

                          EPIGASTRIC PAIN                    

 

                2018           SARWAT FITZPATRICK DO           Ot        

      R19.03       

                          RIGHT LOWER QUADRANT ABDOMINAL SWELLING,              

      

 

                2018           SARWAT FITZPATRICK DO, Ot        

      J44.9        

                          CHRONIC OBSTRUCTIVE PULMONARY DISEASE, U              

      

 

                2018           NIKI FITZPATRICK           Ot     

         R10.11    

                          RIGHT UPPER QUADRANT PAIN                    

 

                2018           SARWAT FITZPATRICK DO, Ot        

      D18.09       

                          HEMANGIOMA OF OTHER SITES                    

 

                2018           SARWAT FITZPATRICK DO           Ot        

      G57.92       

                          UNSPECIFIED MONONEUROPATHY OF LEFT LOWER              

      

 

                2018           SARWAT FITZPATRICK DO, Ot        

      M47.816      

                          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              

      

 

                2018           SARWAT FITZPATRICK DO, Ot        

      Z86.69       

                          PERSONAL HISTORY OF DIS OF THE NERVOUS S              

      

 

                2018           SARWTA FITZPATRICK DO, Ot        

      D18.09       

                          HEMANGIOMA OF OTHER SITES                    

 

                2018           SARWAT FITZPATRICK DO, Ot        

      G57.92       

                          UNSPECIFIED MONONEUROPATHY OF LEFT LOWER              

      

 

                2018           SARWAT FITZPATRICK DO, Ot        

      M47.816      

                          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              

      

 

                2018           SARWAT FITZPATRICK DO           Ot        

      Z86.69       

                          PERSONAL HISTORY OF DIS OF THE NERVOUS S              

      

 

                2020           SARWAT FITZPATRICK DO           Ot        

      G45.9        

                          TRANSIENT CEREBRAL ISCHEMIC ATTACK, UNSP              

      

 

                2020           NIKI FITZPATRICK ARNP           Ot     

         R05       

                          COUGH                              

 

                2020           NIKI FITZPATRICK ARNP           Ot     

         240.9     

                          GOITER NOS                         

 

                2020           NIKI FITZPATRICK ARNP           Ot     

         787.20    

                          DYSPHAGIA, UNSPECIFIED                    

 

                2020           NIKI FITZPATRICK ARNP           Ot     

         789.09    

                          ABDOMINAL PAIN, OTHER SPECIFIED SITE                  

  

 

                2020           NIKI FITZPATRICK ARNP           Ot     

         790.29    

                          OTHER ABNORMAL GLUCOSE                    

 

                2020           JOVANNA BARRY DO           Ot       

       527.7       

                          SALIVARY SECRETION DIS                    

 

                2020           JOVANNA BARRY DO           Ot       

       780.79      

                          OTH MALAISE FATIGUE                    

 

                2020           JOVANNA BARRY DO           Ot       

       787.99      

                          OTHER GI SYSTEM SYMPTOMS                    

 

                2020           JOVANNA BARRY DO           Ot       

       789.06      

                          ABDOMINAL PAIN, EPIGASTRIC                    

 

                2020           NIKI FITZPATRICK           Ot     

         786.50    

                          CHEST PAIN NOS                     

 

                2020           NIKI FITZPATRICKP           Ot     

         I25.10    

                          ATHSCL HEART DISEASE OF NATIVE CORONARY               

      

 

                2020           NIKI FITZPATRICK           Ot     

         I65.23    

                          OCCLUSION AND STENOSIS OF BILATERAL TERRAZAS              

      

 

                2020           NIKI FITZPATRICK           Ot     

         I73.9     

                          PERIPHERAL VASCULAR DISEASE, UNSPECIFIED              

      

 

                2020           NIKI FITZPATRICK           Ot     

         R42       

                          DIZZINESS AND GIDDINESS                    

 

                2020           FITZPATRICK DO, SARWAT ZAPATA           Ot        

      R10.13       

                          EPIGASTRIC PAIN                    

 

                2020           FITZPATRICK DO, SARWAT ZAPATA           Ot        

      R19.03       

                          RIGHT LOWER QUADRANT ABDOMINAL SWELLING,              

      

 

                2020           FITZPATRICK DO, SARWAT ZAPATA Ot        

      J44.9        

                          CHRONIC OBSTRUCTIVE PULMONARY DISEASE, U              

      

 

                2020           NIKI FITZPATRICK           Ot     

         R10.11    

                          RIGHT UPPER QUADRANT PAIN                    

 

                2020           FITZPATRICK DO, SARWAT ZAPATA           Ot        

      D18.09       

                          HEMANGIOMA OF OTHER SITES                    

 

                2020           FITZPATRICK DO, SARWAT ZAPATA           Ot        

      G57.92       

                          UNSPECIFIED MONONEUROPATHY OF LEFT LOWER              

      

 

                2020           FITZPATRICKSARWAT MASTERSON DO, Ot        

      M47.816      

                          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              

      

 

                2020           FITZPATRICK , SARWAT ZAPATA           Ot        

      Z86.69       

                          PERSONAL HISTORY OF DIS OF THE NERVOUS S              

      

 

                2020           AMARI ZAZUETA, SARWAT ZAPATA Ot        

      G45.9        

                          TRANSIENT CEREBRAL ISCHEMIC ATTACK, UNSP              

      

 

                2020           SARWAT FITZPATRICK DO, Ot        

      I65.23       

                          OCCLUSION AND STENOSIS OF BILATERAL TERRAZAS              

      



                                                                                
                                                                                
                                                                                
                                                                                
                                                  



Procedures

      



There is no data.                  



Results

      



                    Test                Result              Range        

 

                                        Complete blood count (CBC) with automate

d white blood cell (WBC) differential - 

20 17:00         

 

                          Blood leukocytes automated count (number/volume)      

     5.9 10*3/uL          

                                        4.3-11.0        

 

                          Blood erythrocytes automated count (number/volume)    

       4.54 10*6/uL       

                                        4.35-5.85        

 

                    Venous blood hemoglobin measurement (mass/volume)           

13.9 g/dL           

11.5-16.0        

 

                    Blood hematocrit (volume fraction)           42 %           

     35-52        

 

                    Automated erythrocyte mean corpuscular volume           92 [

z_us]           

80-99        

 

                                        Automated erythrocyte mean corpuscular h

emoglobin (mass per erythrocyte)        

                          31 pg                     25-34        

 

                                        Automated erythrocyte mean corpuscular h

emoglobin concentration measurement 

(mass/volume)             33 g/dL                   32-36        

 

                    Automated erythrocyte distribution width ratio           13.

8 %              10.0-

14.5        

 

                    Automated blood platelet count (count/volume)           210 

10*3/uL           

130-400        

 

                          Automated blood platelet mean volume measurement      

     10.9 [foz_us]        

                                        7.4-10.4        

 

                    Automated blood neutrophils/100 leukocytes           55 %   

             42-75       

 

 

                    Automated blood lymphocytes/100 leukocytes           30 %   

             12-44       

 

 

                    Blood monocytes/100 leukocytes           13 %               

 0-12        

 

                    Automated blood eosinophils/100 leukocytes           3 %    

             0-10        

 

                    Automated blood basophils/100 leukocytes           1 %      

           0-10        

 

                    Blood neutrophils automated count (number/volume)           

3.2 10*3            

1.8-7.8        

 

                    Blood lymphocytes automated count (number/volume)           

1.8 10*3            

1.0-4.0        

 

                    Blood monocytes automated count (number/volume)           0.

7 10*3            

0.0-1.0        

 

                    Automated eosinophil count           0.2 10*3/uL           0

.0-0.3        

 

                    Automated blood basophil count (count/volume)           0.0 

10*3/uL           

0.0-0.1        

 

                                        PT panel in platelet poor plasma by coag

ulation assay - 20 17:00         

 

                          Prothrombin time (PT) in platelet poor plasma by coagu

lation assay           

12.4 s                                  12.2-14.7        

 

                          INR in platelet poor plasma or blood by coagulation as

say           0.9         

                                        0.8-1.4        

 

                                        Activated partial thromboplastin time (a

PTT) in platelet poor plasma 

bycoagulation assay - 20 17:00         

 

                                        Activated partial thromboplastin time (a

PTT) in platelet poor plasma 

bycoagulation assay           27 s                      24-35        

 

                                        Comprehensive metabolic panel - 20

 17:00         

 

                          Serum or plasma sodium measurement (moles/volume)     

      139 mmol/L          

                                        135-145        

 

                          Serum or plasma potassium measurement (moles/volume)  

         4.5 mmol/L       

                                        3.6-5.0        

 

                          Serum or plasma chloride measurement (moles/volume)   

        103 mmol/L        

                                                

 

                    Carbon dioxide           23 mmol/L           21-32        

 

                          Serum or plasma anion gap determination (moles/volume)

           13 mmol/L      

                                        5-14        

 

                          Serum or plasma urea nitrogen measurement (mass/volume

)           14 mg/dL      

                                        7-18        

 

                          Serum or plasma creatinine measurement (mass/volume)  

         0.74 mg/dL       

                                        0.60-1.30        

 

                    Serum or plasma urea nitrogen/creatinine mass ratio         

  19                  NRG 

       

 

                                        Serum or plasma creatinine measurement w

ith calculation of estimated glomerular 

filtration rate           >                         NRG        

 

                    Serum or plasma glucose measurement (mass/volume)           

93 mg/dL            

        

 

                    Serum or plasma calcium measurement (mass/volume)           

9.3 mg/dL           

8.5-10.1        

 

                          Serum or plasma total bilirubin measurement (mass/volu

me)           0.5 mg/dL   

                                        0.1-1.0        

 

                                        Serum or plasma alkaline phosphatase aretha

surement (enzymatic activity/volume)    

                          102 U/L                           

 

                                        Serum or plasma aspartate aminotransfera

se measurement (enzymatic 

activity/volume)           36 U/L                    5-34        

 

                                        Serum or plasma alanine aminotransferase

 measurement (enzymatic activity/volume)

                          21 U/L                    0-55        

 

                    Serum or plasma protein measurement (mass/volume)           

7.4 g/dL            

6.4-8.2        

 

                    Serum or plasma albumin measurement (mass/volume)           

4.1 g/dL            

3.2-4.5        

 

                    CALCIUM CORRECTED           9.2 mg/dL           8.5-10.1    

    

 

                                        Magnesium - 20 17:00         

 

                    Magnesium           2.2 mg/dL           1.6-2.4        

 

                                        Serum or plasma creatine kinase measurem

ent (enzymatic activity/volume) - 

20 17:00         

 

                                        Serum or plasma creatine kinase measurem

ent (enzymatic activity/volume)         

                          43 U/L                            

 

                                        Serum or plasma creatine kinase MB measu

rement (enzymatic activity/volume) - 

20 17:00         

 

                                        Serum or plasma creatine kinase MB measu

rement (enzymatic activity/volume)      

                          1.1 ng/mL                 <6.6        

 

                                        Serum or plasma troponin i.cardiac measu

rement (mass/volume) - 20 17:00   

      

 

                          Serum or plasma troponin i.cardiac measurement (mass/v

olume)           < ng/mL  

                                        <0.028        

 

                                        Myoglobin, serum - 20 17:00       

  

 

                    Myoglobin, serum           53.3 ng/mL           10.0-92.0   

     

 

                                        Serum or plasma thyrotropin measurement 

by detection limit <=0.05 miu/l 

(units/volume) - 20 17:00         

 

                                        Serum or plasma thyrotropin measurement 

by detection limit <=0.05 miu/l 

(units/volume)            1.78 u[iU]/mL             0.35-4.94        

 

                                        Serum or plasma lithium measurement (mol

es/volume) - 20 17:00         

 

                    BNP PT              91.6 pg/mL           <100.0        

 

                                        Serum or plasma troponin i.cardiac measu

rement (mass/volume) - 20 22:58   

      

 

                          Serum or plasma troponin i.cardiac measurement (mass/v

olume)           < ng/mL  

                                        <0.028        

 

                                        Complete blood count (CBC) with automate

d white blood cell (WBC) differential - 

20 03:04         

 

                          Blood leukocytes automated count (number/volume)      

     6.1 10*3/uL          

                                        4.3-11.0        

 

                          Blood erythrocytes automated count (number/volume)    

       4.08 10*6/uL       

                                        4.35-5.85        

 

                    Venous blood hemoglobin measurement (mass/volume)           

12.4 g/dL           

11.5-16.0        

 

                    Blood hematocrit (volume fraction)           38 %           

     35-52        

 

                    Automated erythrocyte mean corpuscular volume           92 [

foz_us]           

80-99        

 

                                        Automated erythrocyte mean corpuscular h

emoglobin (mass per erythrocyte)        

                          30 pg                     25-34        

 

                                        Automated erythrocyte mean corpuscular h

emoglobin concentration measurement 

(mass/volume)             33 g/dL                   32-36        

 

                    Automated erythrocyte distribution width ratio           13.

6 %              10.0-

14.5        

 

                    Automated blood platelet count (count/volume)           203 

10*3/uL           

130-400        

 

                          Automated blood platelet mean volume measurement      

     11.0 [foz_us]        

                                        7.4-10.4        

 

                    Automated blood neutrophils/100 leukocytes           57 %   

             42-75       

 

 

                    Automated blood lymphocytes/100 leukocytes           26 %   

             12-44       

 

 

                    Blood monocytes/100 leukocytes           13 %               

 0-12        

 

                    Automated blood eosinophils/100 leukocytes           4 %    

             0-10        

 

                    Automated blood basophils/100 leukocytes           1 %      

           0-10        

 

                    Blood neutrophils automated count (number/volume)           

3.4 10*3            

1.8-7.8        

 

                    Blood lymphocytes automated count (number/volume)           

1.6 10*3            

1.0-4.0        

 

                    Blood monocytes automated count (number/volume)           0.

8 10*3            

0.0-1.0        

 

                    Automated eosinophil count           0.3 10*3/uL           0

.0-0.3        

 

                    Automated blood basophil count (count/volume)           0.0 

10*3/uL           

0.0-0.1        

 

                                        Comprehensive metabolic panel - 20

 03:04         

 

                          Serum or plasma sodium measurement (moles/volume)     

      141 mmol/L          

                                        135-145        

 

                          Serum or plasma potassium measurement (moles/volume)  

         3.7 mmol/L       

                                        3.6-5.0        

 

                          Serum or plasma chloride measurement (moles/volume)   

        107 mmol/L        

                                                

 

                    Carbon dioxide           24 mmol/L           21-32        

 

                          Serum or plasma anion gap determination (moles/volume)

           10 mmol/L      

                                        5-14        

 

                          Serum or plasma urea nitrogen measurement (mass/volume

)           10 mg/dL      

                                        7-18        

 

                          Serum or plasma creatinine measurement (mass/volume)  

         0.75 mg/dL       

                                        0.60-1.30        

 

                    Serum or plasma urea nitrogen/creatinine mass ratio         

  13                  NRG 

       

 

                                        Serum or plasma creatinine measurement w

ith calculation of estimated glomerular 

filtration rate           >                         NRG        

 

                    Serum or plasma glucose measurement (mass/volume)           

92 mg/dL            

        

 

                    Serum or plasma calcium measurement (mass/volume)           

8.7 mg/dL           

8.5-10.1        

 

                          Serum or plasma total bilirubin measurement (mass/volu

me)           0.6 mg/dL   

                                        0.1-1.0        

 

                                        Serum or plasma alkaline phosphatase aretha

surement (enzymatic activity/volume)    

                          84 U/L                            

 

                                        Serum or plasma aspartate aminotransfera

se measurement (enzymatic 

activity/volume)           23 U/L                    5-34        

 

                                        Serum or plasma alanine aminotransferase

 measurement (enzymatic activity/volume)

                          15 U/L                    0-55        

 

                    Serum or plasma protein measurement (mass/volume)           

6.1 g/dL            

6.4-8.2        

 

                    Serum or plasma albumin measurement (mass/volume)           

3.5 g/dL            

3.2-4.5        

 

                    CALCIUM CORRECTED           9.1 mg/dL           8.5-10.1    

    



                                          



Encounters

      



                ACCT No.           Visit Date/Time           Discharge          

 Status         

             Pt. Type           Provider           Facility           Loc./Unit 

          

Complaint        

 

                    [id number]           2020 18:05:00           

020 08:50:00        

                DIS             Inpatient           SARWAT FITZPATRICK DO 

[facility]           CSD                       PAPLITATIONS,DY

SPNEA,PEDAL 

EDEMA,LBBB        

 

                    [id number]           2020 10:27:00           

020 23:59:59        

                CLS             Outpatient           SARWAT FITZPATRICK DO 

[facility]           RAD                       TRANSIENT ISCHE

KATIE ATTACK  

      

 

                    [id number]           2018 10:15:00           

018 23:59:59        

                CLS             Outpatient           SARWAT FITZPATRICK DO     

      [facility] 

[facility]           RAD                       L LEG NEUROPATH

Y        

 

                    [id number]           12/15/2017 08:38:00           12/15/2

017 23:59:59        

                CLS             Preadmit           SARWAT FITZPATRICK DO       

    [facility]           RAD                       SIATICA        

 

                    [id number]           2017 12:30:00           

017 23:59:59        

                CLS             Outpatient           FITZPATRICKNIKI HERNANDEZ ARNP  

         [facility] 

[facility]           RAD                       RUQ PAIN       

 

 

                    [id number]           10/05/2016 12:35:00           10/05/2

016 23:59:59        

                CLS             Outpatient           SARWAT FITZPATRICK DO     

      [facility] 

[facility]           RT                        J44.9        

 

                    [id number]           2016 13:40:00           

016 23:59:59        

                CLS             Outpatient           SARWAT FITZPATRICK DO     

      [facility] 

[facility]           RAD                       R10.13        

 

                    [id number]           2016 11:39:00           

016 23:59:59        

                CLS             Outpatient           FITZPATRICKALEENA HERNANDEZIA L ARNP  

         [facility] 

[facility]           RAD                       CLAUDICATION,DI

ZZINESS, CAD

        

 

                    [id number]           2015 11:06:00           

015 23:59:59        

                CLS             Outpatient           FITZPATRICKALEENA HERNANDEZIA L ARNP  

         [facility] 

[facility]           RAD                       COUGH        

 

                    [id number]           2015 12:33:00           

015 23:59:59        

                CLS             Outpatient           FITZPATRICKVIKY HERNANDEZRICIA L ARNP  

         [facility] 

[facility]           CARD                      CHEST PAIN     

   

 

                    [id number]           2015 08:25:00           

015 23:59:59        

                CLS             Outpatient           DEFFENBAUGH JOVANNA    

       [facility]ia 

[facility]           LAB                       ABD PAIN,CHG IN

 BOWEL 

HABITS,MALAISE,DIST OF SALIV        

 

                    [id number]           2015 08:09:00           

015 23:59:59        

                CLS             Outpatient           FITZPATRICKALEENA MASTERSONIA L ARNP  

         [facility]ia 

[facility]           LAB                       ELEVATED GLUCOS

E        

 

                    [id number]           2015 13:28:00           

015 23:59:59        

                CLS             Outpatient           NIKI FITZPATRICK  

         [facility]ia 

[facility]           RAD                       RIGHT RENAL PEL

VIS PAIN    

    

 

                    [id number]           10/30/2014 14:43:00           10/30/2

014 23:59:59        

                CLS             Outpatient           NIKI FITZPATRICK  

         [facility]ia 

[facility]           RAD                       THYROMEGALY/ DI

FFICULTY 

SWALLOWING        

 

                    [id number]           2014 15:00:00           

014 16:45:00        

                DIS             Inpatient           SARWAT FITZPATRICK DO      

     [facility]ia 

[facility]           4TH                       PNEUMONIA      

  

 

                    [id number]           2014 10:28:00           

014 23:59:59        

                CLS             Outpatient           SARWAT FITZPATRICK DO     

      [facility] 

[facility]           RAD                       PNEUMONIA,FEVER

        

 

                    [id number]           2014 15:47:00           

014 23:59:59        

                CLS             Outpatient           LAUREN BOND MD       

    [facility]           RAD                       MAY 2012 NODULE ON CXR

        

 

                    [id number]           2014 15:32:00           

014 23:59:59        

                CLS             Outpatient           NIKI FITZPATRICK  

         [facility] 

[facility]           RAD                       URINARY FREQUEN

CY        

 

                    [id number]           2013 12:18:00           

013 23:59:59        

                CLS             Outpatient           NIKI FITZPATRICK  

         [facility] 

[facility]           RAD                       THYROID NODULE 

RECHECK     

   

 

                    [id number]           2013 10:59:00           

013 22:00:00        

                DIS             Inpatient           SARWAT FITZPATRICK DO      

     [facility] 

[facility]           CSD                       CHEST PAIN     

   

 

                    [id number]           2013 13:50:00           

013 23:59:59        

                CLS             Outpatient           NIKI CAM   

        [facility] 

[facility]           RAD                       NECK   RT SHOUL

COLLIN PAIN    

    

 

             [id number]           2020 12:30:00                        P

EN           

Preadmit                  AUBRIE VAUGHN MD           [facility]

                          CARD                      PARKS,PALPITATIONS,LBBB       

 

 

             [id number]           2020 10:00:00                        P

EN           

Preadmit                  RODERICK MD, AUBRIE ZAPATA           [facility]                      PARKS,PALPITATION,LBBB        

 

             [id number]           2015 13:29:00                         

            

Document Registration                                                           

         

 

             [id number]           2015 13:28:00                         

            

Document Registration                                                           

         

 

             [id number]           2013 09:09:00                         

            

Document Registration                                                           

         

 

             [id number]           10/10/2012 11:02:00                         

            

Document Registration                                                           

         

 

             [id number]           10/09/2012 07:10:00                         

            

Document Registration                                                           

         

 

             [id number]           2012 10:37:00                         

            

Document Registration                                                           

         

 

             [id number]           2012 17:16:00                         

            

Document Registration                                                           

         

 

             [id number]           2012 08:55:00                         

            

Document Registration                                                           

         

 

             [id number]           2012 09:21:00                         

            

Document Registration                                                           

         

 

             [id number]           2012 11:52:00                         

            

Document Registration                                                           

         

 

             [id number]           2009 08:51:00                         

            

Document Registration                                                           

         

 

             [id number]           2015 12:33:30                        ACT   

        

Document Registration

## 2020-03-13 NOTE — XMS REPORT
Encounter Summary

                             Created on: 2020



Alvina Charles

External Reference #: [id number]

: 1935

Sex: Female



Demographics





                          Address                   [address] [place]  [address]

 

                          Home Phone                [phone]

 

                          Preferred Language        English

 

                          Marital Status            

 

                          Confucianist Affiliation     1013

 

                          Race                      White

 

                          Ethnic Group              Not  or 





Author





                          Author                    [facility]

 

                          Organization              [facility]

 

                          Address                   Unknown

 

                          Phone                     Unavailable







Support





                Name            Relationship    Address         Phone

 

                Krista Honeycutt   [employer]            Unknown         [phone]







Care Team Providers





                    Care Team Member Name Role                Phone

 

                    Payam Hutchison   PCP                 [phone]







Encounter Details





                          Care Team                 Description



                     Date                Type                Department  

 

                                        



Sammie Arellano MD



[address]



[address]



[place] [address]



[phone] (Fax)                       



                     2017          Documentation       [facility]  



                                         [facility]  



                                         [facility]  



                                         [address]  



                                         [address]   



                                         [place] [address]  



                                         [phone]  







Social History





                                        Date



                 Tobacco Use     Types           Packs/Day       Years Used 

 

                                         



                                         Never Smoker    

 

    



                                         Smokeless Tobacco: Never   



                                         Used   







                    Drinks/Week         oz/Week             Comments



                                         Alcohol Use   

 

                                                             



                                         No   







 



                           Sex Assigned at Birth     Date Recorded

 

 



                                         Not on file 







                                        Industry



                           Job Start Date            Occupation 

 

                                        Not on file



                           Not on file               Not on file 







                                        Travel End



                           Travel History            Travel Start 

 





                                         No recent travel history available.



documented as of this encounter



Plan of Treatment





Not on filedocumented as of this encounter



Visit Diagnoses

Not on filedocumented in this encounter

## 2020-03-13 NOTE — XMS REPORT
Encounter Summary

                             Created on: 2020



Alvina Charles

External Reference #: [id number]

: 1935

Sex: Female



Demographics





                          Address                   [address] [place]  [address]

 

                          Home Phone                [phone]

 

                          Preferred Language        English

 

                          Marital Status            

 

                          Church Affiliation     1013

 

                          Race                      White

 

                          Ethnic Group              Not  or 





Author





                          Author                    [facility]

 

                          Organization              [facility]

 

                          Address                   Unknown

 

                          Phone                     Unavailable







Support





                Name            Relationship    Address         Phone

 

                Krista Honeycutt   [employer]            Unknown         [phone]







Care Team Providers





                    Care Team Member Name Role                Phone

 

                    Payam Hutchison   PCP                 [phone]







Reason for Referral

* Electrophysiology (Routine)



                          Referred By Contact       Referred To Contact



                 Status          Reason          Specialty       Diagnoses /  



                                         Procedures  

 

                                        



[facility]



[address] [place] [address]



[address]



[place] [address]



Phone: [phone]                     







                           Closed                    Procedures  



                                         EKG Outside Record  







* Cardiac Cath (Routine)



                          Referred By Contact       Referred To Contact



                 Status          Reason          Specialty       Diagnoses /  



                                         Procedures  

 

                                        



[facility]



[address] [place] [address]



[address]



[place] [address]



Phone: [phone]                     







                           Closed                    Procedures  



                                         Coronary  



                                         Angiography  











Encounter Details





                          Care Team                 Description



                     Date                Type                Department  

 

                                        



Allyson Ornelas MD



no forwarding address                    



                     2016          Documentation       [facility]  



                                         [facility]  



                                         [facility]  



                                         [address] [place] [address]  



                                         [address]  



                                         [place], [place] [address] [phone]  







Social History





                                        Date



                 Tobacco Use     Types           Packs/Day       Years Used 

 

                                         



                                         Never Smoker    

 

    



                                         Smokeless Tobacco: Never   



                                         Used   







                    Drinks/Week         oz/Week             Comments



                                         Alcohol Use   

 

                                                             



                                         No   







 



                           Sex Assigned at Birth     Date Recorded

 

 



                                         Not on file 







                                        Industry



                           Job Start Date            Occupation 

 

                                        Not on file



                           Not on file               Not on file 







                                        Travel End



                           Travel History            Travel Start 

 





                                         No recent travel history available.



documented as of this encounter



Plan of Treatment





Not on filedocumented as of this encounter



Procedures





                                        Comments



                 Procedure Name  Priority        Date/Time       Associated Diag

nosis 

 

                                         



                     EKG OUTSIDE RECORD  Routine             2016  

 

                                         



                     LAB OUTSIDE RECORD  Routine             2016  

 

                                         



                     XR OUTSIDE RECORD   Routine             2016  

 

                                         



                     CORONARY ANGIOGRAPHY  Routine             2016  



documented in this encounter



Results

* Lab Outside Record (2016)







                                         Specimen

 





                                         Blood







 



                           Narrative                 Performed At

 

 



                                         This result has an attachment that is n

ot available. 





* XR Outside Record (2016)







                                         Specimen

 









 



                           Narrative                 Performed At

 

 



                                         This result has an attachment that is n

ot available. 





documented in this encounter



Visit Diagnoses

Not on filedocumented in this encounter

## 2020-03-13 NOTE — XMS REPORT
Encounter Summary

                             Created on: 2020



Alvina Bansal

External Reference #: [id number]

: 1935

Sex: Female



Demographics





                          Address                   [address] [place]  [address]

 

                          Home Phone                [phone]

 

                          Preferred Language        English

 

                          Marital Status            

 

                          Mosque Affiliation     [id number]

 

                          Race                      White

 

                          Ethnic Group              Not  or 





Author





                          Author                    [facility]

 

                          Organization              [facility]

 

                          Address                   Unknown

 

                          Phone                     Unavailable







Support





                Name            Relationship    Address         Phone

 

                Krista Honeycutt   ECON            Unknown         [phone]







Care Team Providers





                    Care Team Member Name Role                Phone

 

                    Payam Hutchison   PCP                 [phone]







Reason for Referral

* Diagnostic Imaging (Routine)



                          Referred By Contact       Referred To Contact



                 Status          Reason          Specialty       Diagnoses /  



                                         Procedures  

 

                                        



Yousif Hernandez MD



[address]



[address]



[place] [address]



Phone: [phone]



Fax: [phone]                       







                           Closed                    Diagnoses  



                                         Chronic diastolic  



                                         (congestive) heart  



                                         failure (HCC)

  



                                         P  



                                         rocedures  



                                         Electrocardiogram  



                                         (ECG)  











Reason for Visit

* 



 



                           Reason                    Comments

 

 



                                         Hypertension 









Encounter Details





                          Care Team                 Description



                     Date                Type                Department  

 

                                        



Yousif Hernandez MD



[address]



[address]



[place] [address] [phone] (Fax)                      Chronic diastolic (congestive) heart lamont

lure (HCC) (Primary 

Dx); 

Mitral valve insufficiency, unspecified etiology; 

Pulmonary hypertension (HCC); 

Hypothyroidism, unspecified type



                     2017          Office Visit        [facility]  



                                         [address] [place] [address]  



                                         [address]  



                                         [place] [address]  



                                         [phone]  







Social History





                                        Date



                 Tobacco Use     Types           Packs/Day       Years Used 

 

                                         



                                         Never Smoker    

 

    



                                         Smokeless Tobacco: Never   



                                         Used   







                    Drinks/Week         oz/Week             Comments



                                         Alcohol Use   

 

                                                             



                                         No   







 



                           Sex Assigned at Birth     Date Recorded

 

 



                                         Not on file 







                                        Industry



                           Job Start Date            Occupation 

 

                                        Not on file



                           Not on file               Not on file 







                                        Travel End



                           Travel History            Travel Start 

 





                                         No recent travel history available.



documented as of this encounter



Last Filed Vital Signs





                    Reading             Time Taken          Comments



                                         Vital Sign   

 

                    126/72              2017  3:09 PM CST  



                                         Blood Pressure   

 

                    74                  2017  3:09 PM CST reg



                                         Pulse   

 

                    -                   -                    



                                         Temperature   

 

                    -                   -                    



                                         Respiratory Rate   

 

                    -                   -                    



                                         Oxygen Saturation   

 

                    -                   -                    



                                         Inhaled Oxygen   



                                         Concentration   

 

                    62.1 kg (137 lb)    2017  3:09 PM CST  



                                         Weight   

 

                    157.5 cm (5' 2")    2017  3:09 PM CST  



                                         Height   

 

                    25.06               2017  3:09 PM CST  



                                         Body Mass Index   



documented in this encounter



Patient Instructions

* Patient Instructions* 



 Cydney New, RN - 2017  3:44 PM CST



Formatting of this note might be different from the original.

"We want to know how we are doing taking care of our patients.  You may receive 
an on-line e-mail with a survey asking you how we did.  We strive for 10's and V
sascha Satisfied patients.  We would appreciate your time to fill out the survey fo
r us.  Thank you for helping us be the very best we can be."



1.  Dr Hernandez spoke with your about elevated pressures in your heart and how thi
s can make your heart work harder, thus making you more short of breath or have 
chest aches/pains with activity. This is probably caused primarily by the MITRAL
VALVE (leaking/regurgitation)



2.  To check the pressures of the heart and valve, Have ordered an SIXTO (TRANSESO
PHAGEAL ECHO), they will contact you for an appointment.  If you have not heard 
from them in 10 business days, please call [phone]



3.  Have ordered labs that need to be done: THYROID CASCADE, CBC, CMP, IRON STUD
IES, BNP.  PLEASE HAVE DONE TODAY, [facility] [address], [address].    



4.  Dr Hernandez will review the lab results and her nurses should call you with an
y recommendations.  If you have not heard from them in 3-5 days AFTER your get y
our blood drawn, please call [phone]



5.  To help with pressures in your heart, Dr Hernandez would like to start you on a
"mild water pill/diuretic"   FUROSEMIDE 20 MG, TAKE HALF A TABLET EVERYDAY.  She
may decide you need a potassium pill, but only AFTER she is able to review your 
labs.  Her nurses will contact you with her recommendations.



6.  Please weigh yourself everyday and keep a record.  Call if you gain 2#/24 ho
urs, 3#/48 hours or 5#/week or have increased shortness of air, swelling/edema t
o feet and anklees or bloating in abdomen--[phone]



7.  Follow up with Dr Hernandez in ONE MONTH, we should be able to make this appoin
tment today.



Understanding Mitral Valve Regurgitation



Mitral valve regurgitation is when the mitral valve in the heart is leaky. It le
ts some blood flow backwards when the ventricle squeezes.

How mitral valve regurgitation happens

The mitral valve is one of the hearts 4 valves. Normally, these valves help t
he blood flow through the hearts 4 chambers and out to the body without leaki
ng backwards. The mitral valve lies between the left atrium and the left ventric
le. Normally, the mitral valve stops blood from flowing back into the left atriu
m from the left ventricle when the ventricle squeezes. This maximizes forward bl
ood flow through the heart.

With mitral valve regurgitation, some blood leaks back through the valve. This m
akes the heart have to work harder to get blood out to your body. When this bloo
d is regurgitated backwards in the heart, it increases the pressure within the h
eart which can lead to muscle damage as well as high blood pressure in the lungs
.

Mitral valve regurgitation can increase risk for other heart rhythm problems, johnson
ch as atrial fibrillation (AFib). This abnormal heart rhythm results in fast and
irregular heartbeats which can also lower the heart's pumping ability and incre
ases the risk for stroke.

Mitral valve regurgitation can be acute or chronic. If its acute, the valve s
uddenly becomes leaky. In this case, the heart doesnt have time to adapt to t
he leak in the valve. Symptoms are often severe. If its chronic, the valve le
aks more and more over time. The heart has time to adapt to the leak.

What causes mitral valve regurgitation?

Mitral valve regurgitation can be caused by various things, such as:

 Heart attack or coronary artery disease

 Natural aging that can damage the mitral valve

 Tearing of the tissue or muscle that supports the mitral valve such as due to
an injury

 A redundant or floppy mitral valve, called mitral valve prolapse

 Rheumatic heart disease caused by untreated Streptococcus infection. Strep ar
e the bacteria that cause strep throat.

 Certain autoimmune diseases, such as rheumatoid arthritis

 Infection of the heart valves (endocarditis)

 Problems with the mitral valve that are present at birth

 Certain medicines

You can reduce some risk factors for mitral valve regurgitation. For example:

 Use antibiotics as directed to treat a strep infection and prevent rheumatic 
heart disease.

 Reduce the risk for heart valve infection by not injecting illegal drugs.

 Manage risk factors that can lead to a heart attack or chronic heart artery d
isease.

There are other risk factors that you cant change. For example, some conditio
ns that can lead to mitral valve regurgitation are partly genetic.

Symptoms of mitral valve regurgitation

Chronic mitral valve regurgitation often doesnt cause symptoms for a long luma
e. Mild or moderate mitral regurgitation often doesnt cause any symptoms. If 
the condition becomes more severe, you may have symptoms. They may get worse and
happen more often over time. Symptoms may include:

 Shortness of breath with physical activity

 Shortness of breath when lying flat

 Feeling tired

 Less ability to exercise

 Awareness of your heartbeat

 Swelling in your legs, abdomen, and the veins in your neck

 Chest pain (less common)

Acute, severe mitral valve regurgitation is a medical emergency that can cause s
erious symptoms such as:

 Symptoms of shock (pale skin, loss of consciousness, rapid breathing)

 Severe shortness of breath

 Arrhythmias that make the heart unable to pump blood well

Diagnosing mitral valve regurgitation

Your healthcare provider will ask about your health history. He or she will give
you a physical exam. Using a stethoscope, your healthcare provider will listen 
to your heart. This is to check for sounds called heart murmurs and other signs 
that the heart isnt pumping normally. You may also have tests such as:

 Echocardiogram, to look at the structure of the heart using sound waves

 Stress echocardiogram, to see how well the heart does during exercise

 Electrocardiogram (EKG), to check the hearts rhythm

 Cardiac MRI, transesophageal echocardiogram, or cardiac catheterization, if m
ore information is needed

 [id number] [employer]. [address] [place] [address] 2061
7. All rights reserved. This information is not intended as a substitute for pro
fessional medical care. Always follow your healthcare professional's instruction
s.



Transesophageal Echocardiography (SIXTO)

 

A probe in your esophagus produces sound waves.  



Transesophageal echocardiography (SIXTO) is a test that allows your doctor to annabella
rd images of your heart from inside your esophagus, or food pipe. These images h
elp your doctor identify and treat problems such as infection, disease, or defec
ts in your hearts walls or valves. Allow about 1 to 2 hours after the test is
completed to the time you can leave.

Before your test

 Mention allthe medications you take and ask if itsOK to take them befo
re the test.

 Dont eat or drink for6 [id number] before the test. This includes water
.

 Tell your doctor if you have ulcers, a hiatal hernia, or problems swallowing.
Also, let him or her know of any allergies to any medications or sedatives.

 Arrange to have someonedrive youhome after the exam.

During your SIXTO

 When you arrive for your SIXTO, you will change into a hospital gown, and then 
be taken to the testing room.

 Your throat is sprayed with an anesthetic to numb it. You may be given a mild
sedative through an IV (intravenous) line in your arm to help you relax. You may
also be given oxygen. Then youll be asked to lie on your left side.

 The doctor gently inserts the probe into your mouth. As you swallow, the tube
is slowly guided into your esophagus. The tube is lubricated to make it slide e
asily.

 You may feel the doctor moving the probe, but it shouldnt hurt or interfer
e with your breathing. A nurse monitors your heart rate, blood pressure, and santana
athing.The test usually takes 20 to 40 minutes.

After the test

 You can eatand drink again when your throat is no longer numb.

 Be sure to keep your follow-up appointment.

 Your next appointment is: ____________________

 [id number] [employer]. [address] [place] [address]
7. All rights reserved. This information is not intended as a substitute for pro
fessional medical care. Always follow your healthcare professional's instruction
s.







Electronically signed by Cydney New RN at 2017  4:07 PM CST





documented in this encounter



Progress Notes

* Yousif Hernandez MD - 2017  3:20 PM CST



Formatting of this note might be different from the original.



    [facility]



Appointment Date: 3/7/2017



Payam Hutchison DO

[address] [place] [id number]



RE:  Alvina Bansal

:   1935  

Visit provider:  Yousif Hernandez MD



Dear Payam Hutchison DO,



I had the pleasure of seeing Alvina Bansal in the office today. She is an 81 y.o. fe
male and presents with the following chief complaints: Dyspnea



HPI:  

I enjoyed meeting Ms. Bansal today to discuss her persistent dyspnea and chest p
ain.  As you may recall, she is a very pleasant 81-year-old woman who has had se
veral years of progressive dyspnea and was found to have diastolic dysfunction a
nd elevated estimated pulmonary pressures by echocardiography last year.  She no
rima a long history of mitral valve prolapse, but was subsequently found during a
hospitalization late last year to have significant at least moderate mitral reg
urgitation, elevated estimated pulmonary pressures, mildly elevated right and le
ft-sided pressures and a marginal cardiac index of 2.1 with preserved LV functio
n and mild to moderate tricuspid regurgitation.  She was placed on medical thera
py that at times has included losartan, nitrate and amlodipine with symptomatic 
hypotension and intolerance.  She was at one point on spironolactone as well but
does not recall it being of particular benefit.  She will get short of breath a
nd chest aching with any exertion.  She has mild edema but does feel does feel m
ore bothered by early satiety and abdominal bloating.  She did have invasive cor
onary angiography in the setting of a mildly elevated troponin during the time o
f her catheterization her hospitalization, which did not suggest any coronary di
sease of note.  She also had pulmonary function testing that showed diminished D
LCO with mild restrictive pattern but no significant COPD, which had been a conc
daron as well.



 

Patient Active Problem List 

Diagnosis SNOMED CT(R) 

 Hypothyroidism HYPOTHYROIDISM 

 Fatigue FATIGUE 

 Exertional dyspnea DYSPNEA ON EXERTION 

 Pulmonary hypertension (HCC) PULMONARY HYPERTENSION 

 Multiple sclerosis (HCC) MULTIPLE SCLEROSIS 

 Mitral regurgitation MITRAL VALVE REGURGITATION 



Past Medical History 

Diagnosis Date 

 Chest pain  

 Edema  

  Lower ext 

 Exertional dyspnea  

 Family history of coronary artery disease  

 Fatigue  

 Hypothyroidism  

 Multiple sclerosis (HCC)  

 Pulmonary hypertension (HCC) 2015 

  Moderate per Echo 



Past Surgical History 

Procedure Laterality Date 

 Cardiac catheterization  2016 

  No angiographic evidence of coronary artery disease. ([facility]) 

 Right heart cath  2016 

  RA: 7. RV: 41/9. PA: 38/11/20. Wedge: 15 with V-waves to 25. LV: 143/13, Ascen
ding aortic pressure: 145/61. CO/CI (Sanna): 3.6/2.1. LVEF 55%. ([facility]) 

 Appendectomy   

 Hysterectomy   

 Tonsillectomy   



Final Medications:

Current Outpatient Prescriptions 

Medication Sig Dispense Refill 

 furosemide (LASIX) 20 MG tablet Take 0.5 tablets (10 mg total) by mouth paz
y. 90 tablet 3 

 thyroid, pork, (ARMOUR) 60 mg Tab tablet Take 60 mg by mouth daily.   



No current facility-administered medications for this visit.  



Allergies 

Allergen Reactions 

 Edgard-1 Anaphylaxis 

  All MIRANDA--lidocaine, Novocain 

 Lasix [Furosemide] Anaphylaxis 

 Naltrexone Analogues  



Family History 

Problem Relation Age of Onset 

 Heart attack Father  

 Kidney cancer Brother  

 Melanoma Brother  



Social History:

Social History 

Substance Use Topics 

 Smoking status: Never Smoker 

 Smokeless tobacco: Never Used 

 Alcohol use No 



Exercise level: None



Diet: Regular

Caffeine: No



Review of Systems 

Constitution: Negative for fever, malaise/fatigue and night sweats. 

HENT: Negative for nosebleeds.  

Eyes: Negative.  

Cardiovascular: Positive for dyspnea on exertion, irregular heartbeat and leg sw
elling. Negative for chest pain, claudication, cyanosis, near-syncope, orthopnea
, palpitations and syncope. 

Respiratory: Positive for shortness of breath. Negative for cough, hemoptysis, s
leep disturbances due to breathing, snoring and wheezing.  

Endocrine: Negative for cold intolerance and polydipsia. 

Hematologic/Lymphatic: Does not bruise/bleed easily. 

Skin: Negative for rash. 

Musculoskeletal: Negative for joint pain and myalgias. 

Gastrointestinal: Negative for dysphagia, hematochezia, nausea and vomiting. 

Genitourinary: Negative for hematuria. 

Neurological: Positive for paresthesias. Negative for brief paralysis, disturban
castillo in coordination, excessive daytime sleepiness, dizziness, focal weakness, li
ght-headedness, loss of balance and numbness. 

Psychiatric/Behavioral: Negative for depression. 

All other systems reviewed and are negative.



Vital Signs 

 17 1509 

BP: 126/72 

Pulse: 74 

Weight: 62.1 kg (137 lb) 

Height: 1.575 m (5' 2") 

 

BMI: Body mass index is 25.06 kg/(m^2).

Physical Exam 

Constitutional: She appears well-developed and well-nourished. 

HENT: 

Head: Normocephalic. 

Eyes: Conjunctivae are normal. No scleral icterus. 

Neck: JVD present. 

Cardiovascular: Normal rate.  

Murmur heard.

Pulses:

     Carotid pulses are 2+ on the right side, and 2+ on the left side.

     Radial pulses are 2+ on the right side, and 2+ on the left side. 

No bruit 

Pulmonary/Chest: Effort normal and breath sounds normal. 

Abdominal: Soft. There is no tenderness. 

Musculoskeletal: She exhibits no edema or tenderness. 

Neurological: She is alert. 

No aphasia, Moves all extremities 

Skin: Skin is warm and dry. 

Psychiatric: She has a normal mood and affect. 



Cholesterol (no units) 

Date Value 

2016 158 



Triglycerides (mg/dL) 

Date Value 

2016 80 



LDL Cholesterol 

Date/Time Value Ref Range Status 

2016 102 mg/dL Final 



EKG: Reviewed



Encounter Diagnoses 

Name Primary? 

 Chronic diastolic (congestive) heart failure (HCC) Yes 

 Mitral valve insufficiency, unspecified etiology  

 Pulmonary hypertension (HCC)  

 Hypothyroidism, unspecified type  



Impression and Plan:

1.  Chronic heart failure with preserved ejection fraction in the setting of catarina
vular disease.

a.  New York Heart Association functional class IIIB.

b.  American College of Cardiology stage C.

c.  Hypervolemic on exam.

d.  Last ejection fraction of 55%.

e.  Mild pulmonary hypertension.

-- I have asked her to do a trial of low-dose diuretic therapy with furosemide 1
0 mg daily.  We will assess laboratories today to include a renal panel, BMP, CB
C, iron studies and thyroid cascade before making any additional medical therapy
 recommendations, which may include potassium supplementation or low-dose spiron
olactone therapy.

-- I have asked her to undergo transesophageal echocardiography to further evalu
ate the etiology and degree of severity of her mitral valve disease and follow u
p thereafter.

2.  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
rdial infarction with normal coronary arteries, chest pain that may be noncardia
c in nature or associated with, given her history of multiple sclerosis and by h
er reports in the past, some autoimmune disease versus associated with her heart
 failure.  She has not tolerated antianginal therapy to include calcium channel 
blockers and nitrates.  We will assess her response to diuretic therapy.



Treatment goals, progress and next steps, as above, were discussed and mutually 
agreed upon with the patient/family.  



Thank you for allowing me to participate in Alvina Bansal's care.  If I can be of an
y further assistance, please do not hesitate to contact me.



Sincerely,



Yousif Hernandez MD 



BATIFFANIE/mac





Electronically signed by Yousif Hernandez MD at 2017  4:38 PM CDT

documented in this encounter



Plan of Treatment





Not on filedocumented as of this encounter



Procedures





                                        Comments



                 Procedure Name  Priority        Date/Time       Associated Diag

nosis 

 

                                        



Results for this procedure are in the results section.



                 ECG             Routine         2017      Chronic diastol

ic 



                           3:13 PM CST               (congestive) heart 



                                         failure (HCC) 



documented in this encounter



Results

* NTproBNP (2017  4:30 PM CST)



    



              Component    Value        Ref Range    Performed At  Pathologist



                                         Signature

 

    



                 NTproBNP        190             pg/mL           [facility] 



                           Comment:                  REGIONAL 



                           NT-proBNP Reference Ranges:   LABORATORIES 



                                          <50 yr        



                                         <450 pg/mL   



                                          50-75 yr       



                                         <900 pg/mL   



                                          >75 yr        



                                         <1800 pg/mL   



                                         A cutoff value of 1200 pg/mL   



                                         is recommended in patients 50   



                                         to 70 years of age with a GFR   



                                         between 30 and 60. NT-proBNP   



                                         is unreliable in patients with   



                                         GFR <30.   











                                         Specimen

 





                                         Blood







   



                 Performing Organization  Address         City/[facility]

one Number

 

   



                 [facility] [address] [place] [address]

  [phone]



                                         LABORATORIES   





* Thyroid Vernon (2017  4:30 PM CST)



    



              Component    Value        Ref Range    Performed At  Pathologist



                                         Signature

 

    



                 Thyroid         0.90            0.47 - 4.68 uIU/mL  [facility] 



                           Stimulating               [facility] 



                           Hormone                   LAB[facility]ATORIES 











                                         Specimen

 





                                         Blood







   



                 Performing Organization  Address         City/[facility]

one Number

 

   



                 [facility] [address] [place] [address]

  [phone]



                                         LABORATORIES   





* Comprehensive Metabolic Panel (2017  4:30 PM CST)



    



              Component    Value        Ref Range    Performed At  Pathologist



                                         [facility]

 

    



                 Sodium          140             133 - 147 MEQ/L  [facility] 

 

    



                 Potassium       4.6             3.5 - 5.3 MEQ/L  [facility] 

 

    



                 Chloride        101             96 - 112 MEQ/L  [facility] 

 

    



                 Carbon Dioxide  31              20 - 32 MEQ/L   [facility] 

 

    



                 Anion Gap       9               5 - 17          [facility] 

 

    



                 Calcium         9.5             8.4 - 10.5 mg/dL  [facility] 

 

    



                 Glucose         118 (H)         70 - 100 mg/dL  [facility] 

 

    



                 Protein Total   7.1             6.0 - 8.2 g/dL  [facility] 



                           Serum                     [facility] 

 

    



                 Albumin         4.1             3.5 - 5.0 g/dL  [facility] 

 

    



                 Alkaline        99              42 - 140 IU/L   [facility] 



                           Phosphatase               [facility] 



                                         LAB[facility]ATORIES 

 

    



                 Alanine         14              13 - 69 IU/L    [facility] 



                           Aminotransferas           [facility] 



                           e                         LABORATORIES 

 

    



                 Aspartate       27              15 - 46 IU/L    [facility] 



                           Aminotransferas           [facility] 



                           e                         LABORATORIES 

 

    



                 Bilirubin Total  0.6             0.2 - 1.3 mg/dL  [facility] 

 

    



                 Blood Urea      17              7 - 26 mg/dL    [facility] 

 

    



                 Creatinine      0.8             0.4 - 1.1 mg/dL  [facility] 

 

    



                 eGFR Female AA  83              60 - 200        [facility] 



                           Comment:                  [facility] 



                           Chronic Kidney Disease less   LABORATORIES 



                                         than 60 mL/min/1.73 sq.m   



                                         Kidney failure less than 15   



                                         mL/min/1.73 sq.m   

 

    



                 eGFR Female     69              60 - 200        [facility] 



                     Non-AA              Comment:            [facility] 



                           Chronic Kidney Disease less   LABORATORIES 



                                         than 60 mL/min/1.73 sq.m   



                                         Kidney failure less than 15   



                                         mL/min/1.73 sq.m   











                                         Specimen

 





                                         Blood







   



                 Performing Organization  Address         City/State/Zipcode  Ph

one Number

 

   



                 [facility] [address] [place], [place] [id number] [phone]



                                         LABORATORIES   





* CBC and Diff (manual diff if necessary) (2017  4:30 PM CST)



    



              Component    Value        Ref Range    Performed At  Pathologist



                                         Signature

 

    



                 WBC             6.63            4.00 - 11.00 TH/uL  [facility] 

 

    



                 RBC             4.36            4.00 - 5.00 MIL/uL  [facility] 

 

    



                 Hemoglobin      13.3            12.0 - 15.0 g/dL  [facility] 

 

    



                 Hematocrit      40              36 - 45 %       [facility] 

 

    



                 MCV             92              80 - 99 fL      [facility] 

 

    



                 MCH             31              27 - 34 pg      [facility] 

 

    



                 MCHC            33              32 - 36 %       [facility] 

 

    



                 RDW             13.0            9.0 - 14.5 %    [facility] 

 

    



                 Platelet Count  214             140 - 400 TH/uL  [facility] 

 

    



                 MPV             11.7            9.4 - 12.3 fL   [facility] 

 

    



                 Nucleated RBCs  0               0 - 0 /100      [facility] 

 

    



                 % Neutrophils   60              45 - 78 %       [facility] 

 

    



                 %Lymphocytes    26              15 - 47 %       [facility] 

 

    



                 %Monocytes      12              0 - 12 %        [facility] 

 

    



                 %Eosinophils    2               0 - 7 %         [facility] 



                                         [facility] 

 

    



                 %Basophils      1               0 - 2 %         [facility] 



                                         [facility] 

 

    



                 % Imm Grans     0               0 - 1 %         [facility] 



                                         [facility] 

 

    



                 # Granulocytes  3.96            1.70 - 6.80 TH/uL  [facility] 



                                         [facility] 

 

    



                 # Lymphocytes   1.73            1.00 - 3.30 TH/uL  [facility] 



                                         [facility] 

 

    



                 # Monocytes     0.76            0.20 - 0.90 TH/uL  [facility] 



                                         [facility] 

 

    



                 # Eosinophils   0.13            0.00 - 0.40 TH/uL  [facility] 



                                         [facility] 

 

    



                 # Basophils     0.04            0.00 - 0.10 TH/uL  [facility] 



                                         [facility] 











                                         Specimen

 





                                         Blood







   



                 Performing Organization  Address         City/State/Zipcode  Ph

one Number

 

   



                 [facility] [address] [place] [address]

  [phone]



                                         LABORATORIES   





* Electrocardiogram (ECG) (2017  3:13 PM CST)







                                         Specimen

 









 



                           Narrative                 Performed At

 

 



                           This result has an attachment that is not available. 

 [facility]



                                                 [facility]

[facility] 



                                                      

      



                                                      

      Test Date:  



                                         2017 



                                         Pat Name:   ALVINA BANSAL     

   Department:  [facility] 



                                         Patient ID:  [id number]      

   Room:     



                                         Gender:    Female      

    Technician:  [id number] 



                                         :     1935    

    Requested By: YOUSIF HERNANDEZ

 



                                         Order Number: [id number]      

  Reading MD:  Yousif Hernandez 



                                                      

   Measurements 



                                         Intervals          

     Axis      



                                         Rate:     74       

     P:      64 



                                         WV:      180      

     QRS:     10 



                                         QRSD:     84       

     T:      61 



                                         QT:      384      

             



                                         QTc:     426      

             



                                                      

Interpretive Statements 



                                         SINUS RHYTHM 



                                         Electronically Signed On 3-7-2017 18:06

:17 CST by Yousif Hernandez 







                                        Procedure Note

 

                                        



Interface, External Ris In - 2017  6:06 PM CST



                 [facility]

                                       

                                       Test Date:    2017

Pat Name:     ALVINA BANSAL                Department:   [facility]

Patient ID:   [id number]                 Room:         

Gender:       Female                   Technician:   [id number]

:          1935               Requested By: YOUSIF HERNANDEZ 

Order Number: [id number]                Reading MD:   Yousif Hernandez

                                 Measurements

Intervals                              Axis          

Rate:         74                       P:            64

WV:           180                      QRS:          10

QRSD:         84                       T:            61

QT:           384                                    

QTc:          426                                    

                           Interpretive Statements

SINUS RHYTHM





Electronically Signed On 3-7-2017 18:06:17 CST by Yousif Hernandez







   



                 Performing Organization  Address         City/State/Zipcode  Ph

one Number

 

   



                                         [facility]   





documented in this encounter



Visit Diagnoses









                                         Diagnosis

 





                                         Chronic diastolic (congestive) heart fa

ilure (HCC)

 





                                         Mitral valve insufficiency, unspecified

 etiology

 





                                         Pulmonary hypertension (HCC)



                                         Other chronic pulmonary heart diseases

 





                                         Hypothyroidism, unspecified type



documented in this encounter

## 2020-03-13 NOTE — XMS REPORT
Encounter Summary

                             Created on: 2020



Alvina Charles

External Reference #: [id number]

: 1935

Sex: Female



Demographics





                          Address                   [address] [place]  [address]

 

                          Home Phone                [phone]

 

                          Preferred Language        English

 

                          Marital Status            

 

                          Advent Affiliation     [id number]

 

                          Race                      White

 

                          Ethnic Group              Not  or 





Author





                          Author                    [facility]

 

                          Organization              [facility]

 

                          Address                   Unknown

 

                          Phone                     Unavailable







Support





                Name            Relationship    Address         Phone

 

                Krista Honeycutt   ECON            Unknown         [phone]







Care Team Providers





                    Care Team Member Name Role                Phone

 

                    Payam Hutchison   PCP                 [phone]







Encounter Details





                          Care Team                 Description



                     Date                Type                Department  

 

                                        



Lucille Alvarado RN                     



                     2019          Abstract            [facility]  



                                         [facility]  



                                         [facility]  



                                         [address]  



                                         [address]  



                                         [place] [address]  



                                         [phone]  







Social History





                                        Date



                 Tobacco Use     Types           Packs/Day       Years Used 

 

                                         



                                         Never Smoker    

 

    



                                         Smokeless Tobacco: Never   



                                         Used   







                    Drinks/Week         oz/Week             Comments



                                         Alcohol Use   

 

                                                             



                                         No   







 



                           Sex Assigned at Birth     Date Recorded

 

 



                                         Not on file 







                                        Industry



                           Job Start Date            Occupation 

 

                                        Not on file



                           Not on file               Not on file 







                                        Travel End



                           Travel History            Travel Start 

 





                                         No recent travel history available.



documented as of this encounter



Plan of Treatment





Not on filedocumented as of this encounter



Visit Diagnoses

Not on filedocumented in this encounter [facility]  HEMATOLOGY/ONCOLOGY OFFICE VISIT    CHIEF COMPLAINT:   Cancer (CLL)    HISTORY OF PRESENT ILLNESS:  Hans Woodward is a 80 year old male who presents for evaluation of CLL/SLL and management of his disorder that dates back to 2009. He has now failed multiple treatments after initial good response. He is currently on ibrutinib for over 70 days with minimal to no response. We discussed I am concerned the ibrutinib is not working. He has been tolerating this medication well.     He has extensive disease and significant bone marrow involvement. He is taking ibrutinib and reports limited to no side effects. We reviewed his PET CT scan and previous Bone marrow aspiration and biopsy. Chromosomes were normal 46 XY.       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    His daughter is present at the time of our conversation. We discussed consideration of treatment with venetoclax rituximab.     He does have a sore on his right nostril. This continues to heal. His mouth sores are resolved.     He has a mild rash on his right forearm that appears to be a contact irritation. He is panhypogammaglobulinemic. We discussed he would likely receive marginal benefit from the flu vaccine given his immunoglobulin levels are low. He will likely need treatment before I would give him the flu shot.     He reports no new symptoms or signs from his previous clinic visit. FULL 14 plus point ROS is listed below and was reviewed today.  Oral Chemotherapy adherence and toxicity template listed below.     He reports no changes in his PMHx, PSHx today 10/30/2019. Clinically stable.     ONCOLOGY HISTORY     LYMPHOMA UNSP SITE XTRNOD/SOLID OR    8/20/2009 Testing       8/20/2009 CT HNCAP - Bulky bilateral cervical adenopathy involving Level 1A, 2A, 2B, 3, 4, 5A, 5B, 6 and 7 levels.  Anterior middle and bilateral hilar adenopathy. Noncalcified and calcified pulmonary nodules with some calcified left  AP window lymph nodes.  The calcified lymph nodes and lung nodules would be compatible with old granulomatous disease.  Other nodules are indeterminate. Retrocrural and paraesophageal adenopathy is also present. Bulky intraabdominal, retroperitoneal, and pelvic adenopathy. Splenomegaly. Evidence of old granulomatous disease in the spleen.        8/28/2009 Biopsy     8/28/2009 Bone Marrow Aspiration and Biopsy          9/15/2009 Initial Diagnosis     LYMPHOMA UNSP SITE XTRNOD/SOLID OR      11/10/2009 Testing       11/10/2009 CT NCAP - Marked decrease size in cervical adenopathy.  Significant decrease in size of bilateral axillary anterior, middle, posterior, mediastinal, bilateral hilar, and pleural adenopathy.  Decreased size of a couple of subcentimeter sized noncalcified pleural nodules in the lungs. Decreased size of retrocrural adenopathy. Interval decrease in bulky intraabdominal, retroperitoneal, pelvic adenopathy and mesenteric adenopathy. Stable splenomegaly.           1/19/2010 Testing       1/19/2010 CT NCAP - Continued decrease in size of cervical lymphadenopathy compared to November 10, 2009. There has been continued slight decrease in adenopathy within the chest compared to November 2009. Stable bilateral small pulmonary lung nodules. No new pulmonary nodules demonstrated.Continued decrease in adenopathy within the abdomen and pelvis compared to November 2009.        8/18/2011 Testing       8/18/2011 CT CAP with contrast -    1. There is continued decrease in size of adenopathy within the chest  abdomen and pelvis compared to January 2010.  2. Stable hepatic hemangiomata.  3. Mildly enlarged spleen, slightly diminished in size.  4. Stable bilateral small pulmonary nodules, and evidence of prior  granulomatous disease.        9/4/2012 Testing     9/4/2012 CT CAP with contrast  Stable size and number of mildly enlarged lymph nodes in the chest and  abdomen.     Stable splenomegaly.     2 new noncalcified  pulmonary nodules in the left lung measuring up to 5 mm  on a background of otherwise stable calcified and uncalcified pulmonary  nodules and sequela of prior granulomatous disease. Consider CT chest  followup in 6-12 months.     Stable hepatic lesions most compatible with multiple hemangiomas.        7/25/2013 Testing       9/25/2013 PET CT FDG Scan Skull Base to Mid- Thigh  IMPRESSION: Stable splenomegaly. There is segmental intense uptake in the  descending colon, in an area which was collapsed with mild diverticulosis  on recent CT scan, likely physiologic. There is no abnormal metabolism in  the recently demonstrated mildly enlarging lymph nodes.        9/10/2013 Testing       9/10/2013 CT CAP with contrast  Slight interval enlargement of previously demonstrated posterior  mediastinal lymphadenopathy, concerning for disease progression. Interval  slight enlargement of several pulmonary nodules. One of these has  diminished, and some of the cylindrical opacities may reflect mucous plugging.    There has been mild interval enlargement of several previously  demonstrated lymph nodes, concerning for disease progression.      9/30/2014 Testing       9/30/2014 CT Chest abdomen pelvis with contrast     1. Interval increase in size of lymphadenopathy within the chest abdomen and pelvis compared to September 2013.  2. Moderate splenic enlargement, with slight interval increase in size.  3. Stable scattered bilateral small pulmonary nodules and calcified granulomata.            10/15/2014 Testing     10/15/2014 PET CT FDG Scan Skull Base to Mid-Thigh  1. No suspicious elevated FDG uptake is seen associated with enlarging lymph nodes seen on CT.  2. Splenomegaly.      1/6/2015 Testing       1/6/2015 CT Chest Abdomen Pelvis W Contrast  1. Interval decrease in size of lymph nodes in the chest, abdomen and pelvis compared to previous examination.  2. The spleen has decreased in size as well.  3. A right lower lobe pulmonary  nodule has possibly minimally increased in size. Other pulmonary nodules are not significantly changed.        3/11/2015 Testing       3/11/2015 CT Chest Abdomen Pelvis W Contrast  1. Mild interval decrease in lymphadenopathy in the chest, abdomen and pelvis compared to previous examination.  2. Mild interval increase in spleen size.  3. A right lower lobe pulmonary nodule has minimally decreased in size. Other pulmonary nodules are stable.        3/16/2016 Testing       3/16/2016 CT Chest Abdomen Pelvis W Contrast  1.  There is diffuse mild increase in adenopathy in the axilla, middle mediastinum, lower paraesophageal, azygos, intra-abdominal and retroperitoneal, and pelvic lymph nodes.  2.  Stable calcified granulomas and subcentimeter bilateral pulmonary nodules.  3.  Slight decrease in mild spinal megaly        4/5/2017 Testing       4/5/2017 CT Chest Abdomen Pelvis W Contrast  IMPRESSION:    Progressive lymphadenopathy above and below the diaphragm and minimal progression of splenomegaly.      5 mm mass in the right external auditory canal persists since 2014. Recommend correlation with physical exam.               4/4/2018 Testing       4/4/2018 CT Chest Abdomen Pelvis W Contrast - Clear interval progression of the adenopathy primarily the abdomen and pelvis with mild probable enlargement of the lymph nodes above the diaphragm, when compared with 4/5/2017.            10/26/2018 Testing     10/26/2018 CT Chest Abdomen Pelvis W Contrast  Splenomegaly has slightly increased.      Lymphadenopathy is stable to minimally increased     Interval slight enlargement of a left lower lobe nodule.      4/1/2019 Testing       4/1/2019 CT Chest Abdomen Pelvis W Contrast     IMPRESSION:   1. Interval progression of lymphadenopathy, as detailed above.     2. No significant change in the splenomegaly or pulmonary nodules.        7/24/2019 Testing       7/24/2019 PET CT FDG SKULL BASE TO MID-THIGHS  1. Extensive lymphadenopathy  involving the neck, chest, abdomen, and pelvis, stable or slightly progressed from most recent CT dated 4/1/2019. Few areas of lymphadenopathy demonstrate uptake only mildly greater than blood pool as described above in detail whereas the remaining regions demonstrate uptake similar to blood pool.  2. Progressive splenomegaly.  3. Stable pulmonary nodules as above, below resolution for PET.  4. Other findings as above.        7/24/2019 Testing       7/24/2019 CT PET scan - 1. Extensive lymphadenopathy involving the neck, chest, abdomen, and pelvis, stable or slightly progressed from most recent CT dated 4/1/2019. Few areas of lymphadenopathy demonstrate uptake only mildly greater than blood pool as described above in detail whereas the remaining regions demonstrate uptake similar to blood pool. 2. Progressive splenomegaly. 3. Stable pulmonary nodules as above, below resolution for PET. 4. Other findings as above.          8/1/2019 Biopsy         8/1/2019 Bone marrow aspiration and biopsy  Submitting Physician: Prosper Stewart MD         Date Specimen Collected: 08/01/19           Accession #:  [id number]     Date Specimen Received:  08/01/19           Requisition #:[id number]     Date Reported:           8/2/2019 15:25    Location: [place] [address]                               * Addendum Present *     ______________________________________________________________________________     Pathologic Diagnosis :     **PRELIMINARY DIAGNOSIS**         Bone marrow, unilateral biopsy and aspirate:     -  Chronic lymphocytic leukemia involving 80-90% of hypercellular bone marrow.     -  Diminished non-neoplastic trilineage hematopoiesis.     -  Circulating chronic lymphocytic leukemia; mild to moderate pancytopenia.     -  Final diagnosis pending cytogenetic analysis.              PAST HISTORIES:  Past medical history, surgical history, family history, and social history were reviewed and updated.  Past Medical History:    Diagnosis Date   • Benign neoplasm of colon 02    tubular adenoma   • BPH (benign prostatic hypertrophy) with urinary obstruction    • Encephalitis 12/10   • Family history of malignant neoplasm of gastrointestinal tract     brother -  of colon CA   • Measles without mention of complication    • Non-Hodgkin's lymphoma (CMS/HCC)     s/p 6 months chemo   • Other closed skull fracture without mention of intracranial injury, unspecified state of consciousness     [place]   • Other vitreous opacities     STEPHANIE Portillo, OD   • Post zoster neuralgia     mild residual left abd   • Presbyopia 3/92    STEPHANIE Portillo, OD   • Primary localized osteoarthrosis, lower leg     R - MAGALI Mercer MD   • Shingles    • Tear of medial cartilage or meniscus of knee, current     R DOI 98 Pin in R leg after pivoting while playing basketball - MAGALI Mercer MD   • Thrombocytopenia (CMS/HCC)     probably chemo related   • Unspecified viral infection, in conditions classified elsewhere and of unspecified site     Inpatient - [facility]     Past Surgical History:   Procedure Laterality Date   • Colonoscopy diagnostic  02    tubular adenoma (Dr. Moreno) -- colon due 2005   • Colonoscopy diagnostic  05    normal colon to cecum (Dr. Moreno) -- colon due 2010   • Colonoscopy w biopsy  9/15/2010    DR Moreno/mucosal tag/recall 9/15/2015* due to past TA and family hx of colon ca   • Flexible sigmoidoscopy dx  96    WNL   • Knee scope,diagnostic  98    R c parital medial meniscectomy, limited trochlear & medial chondroplasty for R bucket-handle medial meniscus tear, Grade 4 chondromalacia, trochlea. Grade 2 condromalacia, patellar & medial compartment - MAG[place]I [place] [place]   • Repair ing hernia,6+y/o,reducibl  1963    Rupture - [facility]     Family History   Problem Relation Age of Onset   • Cancer Maternal Grandmother         leukemia   • Genitourinary Maternal Grandfather         kidney  failure   • Cancer Mother         breast   • Cancer Brother         lung, prostate   • Cancer Brother         lung   • Cancer Brother         -colon ca mets to liver   • Cancer Sister         breast   • Heart Brother         MI   • COPD Brother      Social History     Tobacco Use   • Smoking status: Former Smoker     Packs/day: 1.00     Years: 7.00     Pack years: 7.00     Last attempt to quit: 1963     Years since quittin.8   • Smokeless tobacco: Never Used   Substance Use Topics   • Alcohol use: Yes     Alcohol/week: 16.7 standard drinks     Comment: occ beer   • Drug use: No       MEDICATIONS / ALLERGIES:  Medications and allergies were reviewed and updated.  Current Outpatient Medications   Medication Sig Dispense Refill   • Ibrutinib 420 MG Tab Take 1 tablet by mouth daily. 28 tablet 2   • allopurinol (ZYLOPRIM) 300 MG tablet Take 1 tablet by mouth daily. 30 tablet 5   • finasteride (PROSCAR) 5 MG tablet Take 1 tablet by mouth daily. 90 tablet 3   • MULTI-VITAMIN OR TABS 1 TABLET DAILY (OTC) 30 0   • prochlorperazine (COMPAZINE) 10 MG tablet Take 1 tablet by mouth every 6 hours as needed for Nausea or Vomiting. 30 tablet 11   • loperamide (IMODIUM) 2 MG capsule Take 4 mg (2 caps) by mouth after the first loose stool, then 2 mg (1 cap) after each subsequent loose stool, up to 16 mg (8 caps) daily 30 capsule 11   • amoxicillin (AMOXIL) 500 MG capsule Take 4 pills one hour prior to each dental appointment 8 capsule 2     No current facility-administered medications for this visit.      Facility-Administered Medications Ordered in Other Visits   Medication Dose Route Frequency Provider Last Rate Last Dose   • heparin 100 UNIT/ML lock flush 500 Units  5 mL Intracatheter PRN Prosper Stewart MD   500 Units at 10/02/19 0959   • heparin flush 100 UNIT/ML lock flush 500 Units  5 mL Intracatheter PRN Jacques Ely MD   500 Units at 17 0922     ALLERGIES:  No Known Allergies    REVIEW OF  SYSTEMS  GENERAL:  Patient denies headache, fevers, chills, night sweats, excessive fatigue, change in appetite, weight loss, dizziness  ALLERGIC/IMMUNOLOGIC: Verified allergies: Yes  EYES:  Patient denies significant visual difficulties, double vision, blurred vision  ENT/MOUTH: Patient denies problems with hearing, sore throat, mouth sores, but complains of: sinus drainage  ENDOCRINE:  Patient denies diabetes, thyroid disease, hormone replacement, hot flashes  HEMATOLOGIC/LYMPHATIC: Patient denies easy bruising, bleeding, tender lymph nodes, swollen lymph nodes  BREASTS: Patient denies abnormal masses of breast, nipple discharge, pain  RESPIRATORY:  Patient denies lung pain with breathing, cough, coughing up blood, shortness of breath  CARDIOVASCULAR:  Patient denies anginal chest pain, palpitations, shortness of breath when lying flat, peripheral edema  GASTROINTESTINAL: Patient denies abdominal pain , nausea, vomiting, diarrhea, GI bleeding, constipation, change in bowel habits, heartburn, sensation of feeling full, difficulty swallowing  : Patient denies blood in the urine, burning with urination, frequency, urgency, hesitancy, incontinence  MUSCULOSKELETAL:  Patient denies bone pain, joint swelling, redness, decreased range of motion, but complains of: joint pain, pain ratin, location: in the knees.   SKIN:  Patient denies chronic rashes, inflammation, ulcerations, itching, but complains of: skin changes sore on the nose. And bruising easily.   NEUROLOGIC:  Patient denies loss of balance, areas of focal weakness, abnormal gait, sensory problems, numbness, tingling  PSYCHIATRIC: Patient denies insomnia, depression, anxiety    PHYSICAL EXAM:  Vital Signs:   Oncology Encounter Vitals [10/30/19 1041]   ONC OP Encounter Vitals Group      /68      Pulse 69      Resp       Temp 97.6 °F (36.4 °C)      Temp src Oral      SpO2 100 %      Weight 180 lb 1.6 oz (81.7 kg)      Height       Pain Score 1-2       Pain Location       Pain Education?       BSA (Calculated - m2) - Mag & Mag       BMI (Calculated)      ECOG Performance Status: 1 - No physically strenuous activity, but ambulatory and able to carry out light or sedentary work.  General: Here with his daughter, nontoxic, NAD, interactive, pleasant.   Skin: Warm, normal color, normal texture, normal turgor. Mild forearm skin rash that appears to be a contact.   Head: Normocephalic, atraumatic.  Neck: Supple with no significant adenopathy.  Eyes: Normal conjunctivae and sclerae. Pupils equal, round, reactive to light and accommodation. Extraocular movements intact.  ENT: Mucous membranes are moist. Normal nose. Mild oral ulcers bilateral cheek regions.   Cardiovascular: Regular rate and rhythm, no murmurs, gallops or rubs.  Respiratory: Normal respiratory effort. Clear to auscultation. No wheezes, rales, or rhonchi.  Abdomen: Abdomen is soft and non-tender with active bowel sounds. There is no detected hepatosplenomegaly, masses, or ascites.  Extremities: No clubbing, no cyanosis, no edema. Normal muscle tone and development bilaterally.  Lymph: No cervical, supraclavicular, axillary, inguinal lymphadenopathy.  Neurologic: Motor strength normal. Coordination normal. No tremor noted.  Psychiatric: Cooperative. Appropriate mood and affect. Normal judgment.    DATA REVIEWED:  Laboratory Data:  Recent Labs   Lab 10/30/19  1001   .1*   RBC 2.87*   HGB 10.7*   HCT 31.7*   .5*   PLT 58*   Absolute Neutrophil 0.6*   Absolute Lymph 174.7*   Absolute Mono 1.8*   Absolute Eos 0.0*   Absolute Baso 0.0     Recent Labs   Lab 10/02/19  0910   Glucose 94   Sodium 143   Potassium 4.1   Chloride 105   Carbon Dioxide 28   BUN 16   Creatinine 0.61*   CALCIUM 8.9   TOTAL PROTEIN 6.1*   Albumin 3.7   AST/SGOT 20   ALK PHOSPHATASE 87   ALT/SGPT 25     Recent Labs   Lab 10/30/19  1001 10/02/19  0910   Anion Gap  --  14   GLOBULIN  --  2.4    172   TOTAL BILIRUBIN   --  0.3       Imaging Studies:  Imaging studies reviewed. See oncology history.    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 He has lymph node, bone marrow, spleen involvement, anemia (likely mildly autoimmune from his CLL) and significant thrombocytopenia necessitating treatment. He appears to be failing ibrutinib therapy.   - panhypogammaglobulinemic - recommended against the flu shot given he will likely not achieve a response  - continue allopurinol for at least another month  - RTC in 1-2 weeks for consideration of clinical trial or venetoclax and rituximab protocol  - recommended patient proceed with ibrutinib therapy, which he started two weeks ago  • Drug: Ibrutinib  • Dose: 420 mg (140 mg tabs, 3 daily to start so we can titrate down if needed)  • Route: oral   • Frequency/Regimen schedule: daily  • Plan to give 3 cycles/months followed by repeat imaging to evaluate response to treatment. He will receive at least monthly CBC with diff and follow up at the start of therapy.   • Prior authorization for chemotherapy was obtained and he received the prescription in the mail  • Patient started on Imbruvica on August 19th.   • We again discussed the benefits and risks of treatment including the following potential toxicities. See previous note for details.       Stage of disease: IV    • Monitoring Parameters - Monitor blood counts monthly or as clinically necessary; renal and hepatic function; uric acid levels as clinically necessary; verify pregnancy status prior to treatment initiation (in females of reproductive potential); monitor blood pressure; monitor for sign/symptoms of bleeding, infections, progressive multifocal encephalopathy, tumor lysis syndrome, second primary malignancies; signs/symptoms of cardiac arrhythmias; ECG prior to initiation (patients with  cardiac risk factors or history of cardiac arrhythmias) and during therapy if clinically indicated. Monitor adherence.  • Treatment is being provided with Palliative intent.   • Expected response to treatment: Remission  • Quality of life - good  • Responsible care team: Pantera Stewart MD    Oral Chemotherapy Adherence and Toxicity Check list    Checklist for Documentation of Oral Chemotherapy Adherence and Toxicity  Medication: Ibrutinib 420 mg po daily  Frequency of office visits/labs: monthly  Duration of therapy (planned): indefinite depending on response  Provider/Clinician Reviewing Checklist with Patient:  • Inquire if all questions and concerns about oral chemotherapy have been addressed Patient verbalizes understanding of oral chemotherapy.   • Verify that the patient understands how to take the prescribed oral chemotherapy (e.g., frequency, with or without food, whole or crushed, etc). Medication should be stored away from children and pets. Patient verbalizes understanding.  • Confirm that the patient filled and/or refilled the prescription as written and has reviewed prescription label for correct patient name and drug information and directions. Patient confirmed.   • Ask the patient if he or she has any concerns about treatment costs No concerns verbalized  • Ask patient if he or she has missed any doses  o Number of doses missed per week or month: None  o Reason for missed doses: n/a  • Verify the patient understands what to do in case of missed doses  • Assess for potential toxicity, by asking the patient if he or she has experienced any of the following:  o Nausea/vomiting: No  o Diarrhea:No  o Fatigue: Mild  o Mouth Sores:None currently  o Aches on muscle/joints/bone:No  o Rash: No  o Other: right nostril lesion healing  • Nurse/staff/non-physician practitioner/physician discussed side effects with patient and recommended: RTC 1-2 weeks for consideration of a clinical trial  • Shared suggestions for  improved adherence with patient:  o Keep a journal of side effects, including timing around medication.  o Keep track of when you need a refill and plan ahead before you run out.  o Adjust time of taking medication to better fit into daily schedule.    The patient does not require long term IV access at this time for this treatment though he has a portacath. We recommended he receive monthly portacath flushes as IV chemotherapy may be required in the future. We specifically discussed venetoclax and rituximab combination.     Anxiety/depression concerns:   PHQ 9:0 (remains)     NCCN Psychosocial distress 0; daughter present, he does care for his wife    Advance Directives/POAHC located in EMR    Direct counseling and education of the above.  Multiple questions answered.      Discussed oral chemo adherence and side effects with patient.  Patient will start taking the medication once we are able to arrange delivery.     The patient, daughter(s) indicated understanding of the diagnosis and agreed with the plan of care. The patient is encouraged to call between now and next visit with any problems, questions or concerns that arise.    Thank you for allowing us to collaborate in the care of your patient.     Prosper Stewart MD  Medical Oncology, Hematology, Hospice and Palliative Care  [facility]  [phone]    Return in about 1 week (around 11/6/2019). 1-2 weeks  Labs: Reviewed, including ANC/plt  Recent Labs   Lab 10/30/19  1001   Absolute Neutrophil 0.6*   PLT 58*     Chemotherapy today: No  New dose adjustment: No  Imaging ordered: No  Reason: n/a  Consult ordered: No  Needs Fluids / electrolytes:  No  Other: depending on qualification for clinical trial    Electronically signed by Prosper Stewart MD on 10/30/2019

## 2020-03-13 NOTE — XMS REPORT
Encounter Summary

                             Created on: 2020



Alvina Charels

External Reference #: [id number]

: 1935

Sex: Female



Demographics





                          Address                   [address] [place]  [address]

 

                          Home Phone                [phone]

 

                          Preferred Language        English

 

                          Marital Status            

 

                          Protestant Affiliation     [id number]

 

                          Race                      White

 

                          Ethnic Group              Not  or 





Author





                          Author                    [facility]

 

                          Organization              [facility]

 

                          Address                   Unknown

 

                          Phone                     Unavailable







Support





                Name            Relationship    Address         Phone

 

                Krista Honeycutt   [employer]            Unknown         [phone]







Care Team Providers





                    Care Team Member Name Role                Phone

 

                    Payam Hutchison   PCP                 [phone]







Reason for Visit

* 



 



                           Reason                    Comments

 

 



                           Lab results               bnp, thyroid results









Encounter Details





                          Care Team                 Description



                     Date                Type                Department  

 

                                        



Cydney New RN                    Lab results (bnp, thyroid results)



                     2017          Telephone           [facility]  



                                         [facility]  



                                         [facility]  



                                         [address]  



                                         [address]  



                                         [place] [address]  



                                         [phone]  







Social History





                                        Date



                 Tobacco Use     Types           Packs/Day       Years Used 

 

                                         



                                         Never Smoker    

 

    



                                         Smokeless Tobacco: Never   



                                         Used   







                    Drinks/Week         oz/Week             Comments



                                         Alcohol Use   

 

                                                             



                                         No   







 



                           Sex Assigned at Birth     Date Recorded

 

 



                                         Not on file 







                                        Industry



                           Job Start Date            Occupation 

 

                                        Not on file



                           Not on file               Not on file 







                                        Travel End



                           Travel History            Travel Start 

 





                                         No recent travel history available.



documented as of this encounter



Miscellaneous Notes

* Telephone Encounter - Cydney New RN - 2017  5:09 PM CST



3/9 call to patient to inform of lab results, was able to leave a detailed messa
ge.  Will send results toPCP



Electronically signed by Cydney New RN at 2017  5:10 PM CST

* Telephone Encounter - Cydney New RN - 2017  5:08 PM CST



----- Message from Sammie Arellano MD sent at 3/8/2017  4:38 PM CST -----

Labwork all looks good, how is she doing with low dose diuretic?



Electronically signed by Cydney New RN at 2017  5:08 PM CST

documented in this encounter



Plan of Treatment





Not on filedocumented as of this encounter



Visit Diagnoses

Not on filedocumented in this encounter

## 2020-03-13 NOTE — XMS REPORT
Encounter Summary

                             Created on: 2020



Alvina Charles

External Reference #: [id number]

: 1935

Sex: Female



Demographics





                          Address                   [address] [place]  [address]

 

                          Home Phone                [phone]

 

                          Preferred Language        English

 

                          Marital Status            

 

                          Yarsanism Affiliation     [id number]

 

                          Race                      White

 

                          Ethnic Group              Not  or 





Author





                          Author                    [facility]

 

                          Organization              [facility]

 

                          Address                   Unknown

 

                          Phone                     Unavailable







Support





                Name            Relationship    Address         Phone

 

                Krista Honeycutt   [employer]            Unknown         [phone]







Care Team Providers





                    Care Team Member Name Role                Phone

 

                    Payam Hutchison   PCP                 [phone]







Encounter Details





                          Care Team                 Description



                     Date                Type                Department  

 

                                        



Soo Hughes MD



[address]



[address]



[place] [address] [phone] (Fax)                       



                     2018          Documentation       [facility]  



                                         [facility]  



                                         [facility]  



                                         [address]  



                                         [address]  



                                         [place] [id number] [phone]  







Social History





                                        Date



                 Tobacco Use     Types           Packs/Day       Years Used 

 

                                         



                                         Never Smoker    

 

    



                                         Smokeless Tobacco: Never   



                                         Used   







                    Drinks/Week         oz/Week             Comments



                                         Alcohol Use   

 

                                                             



                                         No   







 



                           Sex Assigned at Birth     Date Recorded

 

 



                                         Not on file 







                                        Industry



                           Job Start Date            Occupation 

 

                                        Not on file



                           Not on file               Not on file 







                                        Travel End



                           Travel History            Travel Start 

 





                                         No recent travel history available.



documented as of this encounter



Plan of Treatment





Not on filedocumented as of this encounter



Visit Diagnoses

Not on filedocumented in this encounter

## 2020-03-13 NOTE — XMS REPORT
Encounter Summary

                             Created on: 2020



Alvina Charles

External Reference #: [id number]

: 1935

Sex: Female



Demographics





                          Address                   [address] [place]  [address]

 

                          Home Phone                [phone]

 

                          Preferred Language        English

 

                          Marital Status            

 

                          Pentecostalism Affiliation     [id number]

 

                          Race                      White

 

                          Ethnic Group              Not  or 





Author





                          Author                    [facility]

 

                          Organization              [facility]

 

                          Address                   Unknown

 

                          Phone                     Unavailable







Support





                Name            Relationship    Address         Phone

 

                Krista Honeycutt   [employer]            Unknown         [phone]







Care Team Providers





                    Care Team Member Name Role                Phone

 

                    Payam Hutchison   PCP                 [phone]







Reason for Visit

* 



 



                           Reason                    Comments

 

 



                                         Symptoms 









Encounter Details





                          Care Team                 Description



                     Date                Type                Department  

 

                                        



Hannah Mccormack RN                       Symptoms



                     2017          Telephone           [facility]  



                                         [facility]  



                                         [facility]  



                                         [address]  



                                         [address]  



                                         [place] [address]  



                                         [phone]  







Social History





                                        Date



                 Tobacco Use     Types           Packs/Day       Years Used 

 

                                         



                                         Never Smoker    

 

    



                                         Smokeless Tobacco: Never   



                                         Used   







                    Drinks/Week         oz/Week             Comments



                                         Alcohol Use   

 

                                                             



                                         No   







 



                           Sex Assigned at Birth     Date Recorded

 

 



                                         Not on file 







                                        Industry



                           Job Start Date            Occupation 

 

                                        Not on file



                           Not on file               Not on file 







                                        Travel End



                           Travel History            Travel Start 

 





                                         No recent travel history available.



documented as of this encounter



Miscellaneous Notes

* Telephone Encounter - Ronda Foreman - 2017  8:22 AM CDT



 this pt has been scheduled 3 different times and has called back to cancel
each time!  I will make another attempt at scheduling but can't guarantee that 
she will keep it.



Electronically signed by Ronda Foreman at 2017  8:26 AM CDT

* Telephone Encounter - Kvng Jean-Baptiste RN - 2017  5:06 PM CDT



Made call to patient with RUTHIE mitchell.  She v/u and states she has not taken her la
six since before her appointment with RUTHIE.  She states she is gald we called as 
she is feeling wiped out/so fatigued.  Her pressures have been low 80s/40s.  She
is having slight edema in her ankles.  SHe also stated that at the beginning of 
the day her stomach feels fine but then she feels like she is 6months pregnant 
by the end of the day.  Denies any dizziness, lightheadedness, CP, SOA.  States 
she is still having palpitations.  Discussed going to ER if symptoms worsen.  Pu
t pt on hold and made call to [facility].



Spoke with [facility] regarding symptoms.  BAA rec compression stockings, to take lasix
if SBP is in 90s, and to proceed to ER if symptoms worsen.  She states that we 
can do holter if palpitations get worse.  



Spoke again with patient and relayed message from [facility]>  SHe v/u.  Will route to 
schedulers to set up appointment with BA>  Note that this is to be a 4wk f/u 
which would of been 17.  



Electronically signed by Kvng Jean-Baptiste RN at 2017  5:40 PM CDT

* Telephone Encounter - Sammie Arellano MD - 2017  1:48 PM CDT



Overall valve leakage appeared less prominent than on last study, pumping functi
on appeared normal.  The mitral valve does appear a little "floppy" and appears 
to leak moderately.  Because that valve leaks into the top of the heart, it can 
stir up extra heart beats or abnormal rhythms and if palpitations persist we can
do a holter monitor to evaluate.  If oxygen is helping would continue.  How oft
en is she using lasix?



Electronically signed by Sammie Arellano MD at 2017  1:50 PM CDT

* Telephone Encounter - Hannah Mccormack RN - 2017  9:33 AM CDT



Patient called stating she had noticed an increase in palpitations "over the pas
t few days".   Patient also stated she noticed having "black toe nails" over thi
s time as well.   Patient had taken herself off of her prescribed oxygen and whe
n she put herself back on oxygen - symptoms disapated.   Patient asking if RUTHIE h
brendan reviewed [facility] echo?  Indicated to her I gave her the copy yesterday
and will ask BATIFFANIE if she has reviewed echo and will discuss symptoms with BATIFFANIE.  
Patient did state she has noticed a decrease in swelling since starting Lasix.  appt changed from 2:00 to 9:50am per patient request.  Verbalized understand
ing of discussion.  Routed to BAA for review.  



Electronically signed by Hannah Mccormack RN at 2017  9:41 AM CDT

documented in this encounter



Plan of Treatment





Not on filedocumented as of this encounter



Visit Diagnoses

Not on filedocumented in this encounter

## 2020-03-13 NOTE — XMS REPORT
Encounter Summary

                             Created on: 2020



Alvina Charles

External Reference #: [id number]

: 1935

Sex: Female



Demographics





                          Address                   [address] [place]  [address]

 

                          Home Phone                [phone]

 

                          Preferred Language        English

 

                          Marital Status            

 

                          Tenriism Affiliation     [id number]

 

                          Race                      White

 

                          Ethnic Group              Not  or 





Author





                          Author                    [facility] Organization [facility]

 

                          Address                   Unknown

 

                          Phone                     Unavailable







Support





                Name            Relationship    Address         Phone

 

                Krista Honeycutt   [employer]            Unknown         [phone]







Care Team Providers





                    Care Team Member Name Role                Phone

 

                    Payam Hutchison   PCP                 [phone]







Encounter Details





                          Care Team                 Description



                     Date                Type                Department  

 

                                        



Sherly Valdivia RN                        



                     2016          Abstract            [facility]  



                                         [facility]  



                                         [facility]  



                                         [address]  



                                         [address]  



                                         [place] [address]  



                                         [phone]  







Social History





                                        Date



                 Tobacco Use     Types           Packs/Day       Years Used 

 

                                         



                                         Never Smoker    

 

    



                                         Smokeless Tobacco: Never   



                                         Used   







                    Drinks/Week         oz/Week             Comments



                                         Alcohol Use   

 

                                                             



                                         No   







 



                           Sex Assigned at Birth     Date Recorded

 

 



                                         Not on file 







                                        Industry



                           Job Start Date            Occupation 

 

                                        Not on file



                           Not on file               Not on file 







                                        Travel End



                           Travel History            Travel Start 

 





                                         No recent travel history available.



documented as of this encounter



Plan of Treatment





Not on filedocumented as of this encounter



Visit Diagnoses

Not on filedocumented in this encounter

## 2020-03-13 NOTE — XMS REPORT
Encounter Summary

                             Created on: 2020



Alvina Charles

External Reference #: [id number]

: 1935

Sex: Female



Demographics





                          Address                   [address] [place]  [address]

 

                          Home Phone                [phone]

 

                          Preferred Language        English

 

                          Marital Status            

 

                          Restoration Affiliation     1013

 

                          Race                      White

 

                          Ethnic Group              Not  or 





Author





                          Author                    [facility]

 

                          Organization              [facility]

 

                          Address                   Unknown

 

                          Phone                     Unavailable







Support





                Name            Relationship    Address         Phone

 

                Krista Honeycutt   [employer]            Unknown         [phone]







Care Team Providers





                    Care Team Member Name Role                Phone

 

                    Payam Hutchison   PCP                 [phone]







Encounter Details





                          Care Team                 Description



                     Date                Type                Department  

 

                                        



Sammie Arellano MD



[address]



[address]



[place] [address] [phone] (Fax)                       



                     2016          Documentation       [facility]  



                                         [facility]  



                                         [facility]  



                                         [address]  



                                         [address]  



                                         [place] [address] [phone]  







Social History





                                        Date



                 Tobacco Use     Types           Packs/Day       Years Used 

 

                                         



                                         Never Smoker    

 

    



                                         Smokeless Tobacco: Never   



                                         Used   







                    Drinks/Week         oz/Week             Comments



                                         Alcohol Use   

 

                                                             



                                         No   







 



                           Sex Assigned at Birth     Date Recorded

 

 



                                         Not on file 







                                        Industry



                           Job Start Date            Occupation 

 

                                        Not on file



                           Not on file               Not on file 







                                        Travel End



                           Travel History            Travel Start 

 





                                         No recent travel history available.



documented as of this encounter



Plan of Treatment





Not on filedocumented as of this encounter



Visit Diagnoses

Not on filedocumented in this encounter

## 2020-03-13 NOTE — XMS REPORT
Encounter Summary

                             Created on: 2020



Alvina Charles

External Reference #: [id number]

: 1935

Sex: Female



Demographics





                          Address                   [address] [place]  [address]

 

                          Home Phone                [phone]

 

                          Preferred Language        English

 

                          Marital Status            

 

                          Jehovah's witness Affiliation     [id number]

 

                          Race                      White

 

                          Ethnic Group              Not  or 





Author





                          Author                    [facility]

 

                          Organization              [facility]

 

                          Address                   Unknown

 

                          Phone                     Unavailable







Support





                Name            Relationship    Address         Phone

 

                Krista Honeycutt   ECON            Unknown         [phone]







Care Team Providers





                    Care Team Member Name Role                Phone

 

                    Payam Hutchison   PCP                 [phone]







Encounter Details





                          Care Team                 Description



                     Date                Type                Department  

 

                                        



Mariam Nguyen MD



[address]



[place] 



[place] [address]



[phone] (Fax)                       



                     10/01/2019          Documentation       [facility]  



                                         [facility]  



                                         [facility]  



                                         [address]  



                                         [address]   



                                         [place] [address]  



                                         [phone]  







Social History





                                        Date



                 Tobacco Use     Types           Packs/Day       Years Used 

 

                                         



                                         Never Smoker    

 

    



                                         Smokeless Tobacco: Never   



                                         Used   







                    Drinks/Week         oz/Week             Comments



                                         Alcohol Use   

 

                                                             



                                         No   







 



                           Sex Assigned at Birth     Date Recorded

 

 



                                         Not on file 







                                        Industry



                           Job Start Date            Occupation 

 

                                        Not on file



                           Not on file               Not on file 







                                        Travel End



                           Travel History            Travel Start 

 





                                         No recent travel history available.



documented as of this encounter



Plan of Treatment





Not on filedocumented as of this encounter



Procedures





                                        Comments



                 Procedure Name  Priority        Date/Time       Associated Diag

nosis 

 

                                         



                     EKG OUTSIDE RECORD  Routine             2019  



                                         9:37 AM CDT  



documented in this encounter



Visit Diagnoses

Not on filedocumented in this encounter

## 2020-03-13 NOTE — XMS REPORT
Encounter Summary

                             Created on: 2020



IraidabipinAlvina snelle

External Reference #: [id number]

: 1935

Sex: Female



Demographics





                          Address                   [address] [place]  [address]

 

                          Home Phone                [phone]

 

                          Preferred Language        English

 

                          Marital Status            

 

                          [employer] Affiliation     1013

 

                          Race                      White

 

                          Ethnic Group              Not  or 





Author





                          Author                    [facility]

 

                          Organization              [facility]

 

                          Address                   Unknown

 

                          Phone                     Unavailable







Support





                Name            Relationship    Address         Phone

 

                Krista Honeycutt   ECON            Unknown         [phone]







Care Team Providers





                    Care Team Member Name Role                Phone

 

                    HutchisonPayam coles   PCP                 [phone]







Reason for Visit

* 



 



                           Reason                    Comments

 

 



                                         Returning patient call 









Encounter Details





                          Care Team                 Description



                     Date                Type                Department  

 

                                        



Cydney Mullins, RN                        Returning patient call



                     10/08/2018          Telephone           [facility]  



                                         [facility]  



                                         [facility]  



                                         [address]  



                                         [address]  



                                         [place] [address]  



                                         [phone]  







Social History





                                        Date



                 Tobacco Use     Types           Packs/Day       Years Used 

 

                                         



                                         Never Smoker    

 

    



                                         Smokeless Tobacco: Never   



                                         Used   







                    Drinks/Week         oz/Week             Comments



                                         Alcohol Use   

 

                                                             



                                         No   







 



                           Sex Assigned at Birth     Date Recorded

 

 



                                         Not on file 







                                        Industry



                           Job Start Date            Occupation 

 

                                        Not on file



                           Not on file               Not on file 







                                        Travel End



                           Travel History            Travel Start 

 





                                         No recent travel history available.



documented as of this encounter



Miscellaneous Notes

* Telephone Encounter - Cydney Mullins RN - 10/08/2018 12:11 PM CDT



Received a VM from pt reporting she needs to have a recent Cr done within 60 day
s before her next apt. Pt reports she checked her records and had a Cr drawn 67 
days ago. Pt is wondering if this Cr would be sufficient. 

Chart reviewed. Pt has a Cardiac MRI scheduled on 10/16/18. 

I attempted to return pt's call. Call went to VM. I did leave a message includin
g if Cr is for the Cardiac MRI, she will need to have an updated level drawn. Ca
ll back number left if there are any other questions or concerns. 



Electronically signed by Cydney Mullins RN at 10/08/2018 12:13 PM CDT

documented in this encounter



Plan of Treatment





Not on filedocumented as of this encounter



Visit Diagnoses

Not on filedocumented in this encounter

## 2020-03-13 NOTE — XMS REPORT
Encounter Summary

                             Created on: 2020



Alvina Charles

External Reference #: [id number]

: 1935

Sex: Female



Demographics





                          Address                   [address] [place]  [address]

 

                          Home Phone                [phone]

 

                          Preferred Language        English

 

                          Marital Status            

 

                          Orthodox Affiliation     [id number]

 

                          Race                      White

 

                          Ethnic Group              Not  or 





Author





                          Author                    [facility]

 

                          Organization              [facility]

 

                          Address                   Unknown

 

                          Phone                     Unavailable







Support





                Name            Relationship    Address         Phone

 

                Krista Honeycutt   ECON            Unknown         [phone]







Care Team Providers





                    Care Team Member Name Role                Phone

 

                    Payam Hutchison   PCP                 [phone]







Encounter Details





                          Care Team                 Description



                     Date                Type                Department  

 

                                        



Loulou Acevedo RN                     



                     2018          Telephone           [facility]  



                                         [facility]  



                                         [facility]  



                                         [address]  



                                         [address]  



                                         [place] [address] [phone]  







Social History





                                        Date



                 Tobacco Use     Types           Packs/Day       Years Used 

 

                                         



                                         Never Smoker    

 

    



                                         Smokeless Tobacco: Never   



                                         Used   







                    Drinks/Week         oz/Week             Comments



                                         Alcohol Use   

 

                                                             



                                         No   







 



                           Sex Assigned at Birth     Date Recorded

 

 



                                         Not on file 







                                        Industry



                           Job Start Date            Occupation 

 

                                        Not on file



                           Not on file               Not on file 







                                        Travel End



                           Travel History            Travel Start 

 





                                         No recent travel history available.



documented as of this encounter



Miscellaneous Notes

* Telephone Encounter - Rita Quan LPN - 2018 10:38 AM CDT



- Pt's daughter called in this AM to report that she is not feeling well and 
I snot able to make her appt and will just come to the ER. Appt was cancelled by
daughter that was made on 18 by my nurse partner. Reviewed chart and called
[facility] and Rounding Nurse to advise that pt will be heading in by private vehi
nico. Pt was going to have labs completed on yesterday so I am sending a request 
to [facility] and see if they can get those records in the chart. 



Electronically signed by Rita Quan LPN at 2018 10:42 AM CDT

* Telephone Encounter - Loulou Acevedo RN - 2018  2:08 PM CDT



Received call from [facility] requesting a diagnosis code for BNP. Provided chronic
diastolic HF ICD-10-CM:I50.32. Lab stated this is sufficient.



Electronically signed by Loulou Acevedo RN at 2018  2:10 PM CDT

* Telephone Encounter - Kvng Jean-Baptiste RN - 2018 12:32 PM CDT



Received [facility] recs.  Made call to patient and spoke with daughter and patient.  VINCENT zhong would like to have lab drawn today at [facility] lab in [place] rather than comin
g to [facility] earlier than appointment to have drawn.  Stated that would be fine.  Lab
s ordered and faxed to [phone] with confirmation fax.



Electronically signed by Kvng Jean-Baptiste RN at 2018  1:03 PM CDT

* Telephone Encounter - Sammie Arellano MD - 2018 12:22 PM CDT



Yes would like CBC, BMP, BNP, Mg, stay on lasix until seen tomorrow.



Electronically signed by Sammie Arellano MD at 2018 12:22 PM CDT

* Telephone Encounter - Loulou Acevedo RN - 2018 10:38 AM CDT



Received life transfer call from  stating the daughter of the pt is on 
the line. Daughter expressed concern that pt cannot get in to see [facility] for a few 
months but is having new symptoms.



Daughter put pt on the line and reported that often at night she will wake up wi
th "Intense discomfort in my chest.. I get real nauseated." pt reports "activity
in my chest" and later described as palpitations.



Asked pt if she counts her pulse or checks her blood pressure when this happens 
and for how long they symptoms last. 

Pt reports she is unaware of time frame due to being" sleepy and droggy ". Pt wi
ll fall back asleep and then wake up again later with the same symptoms.



Asked pt if she has symptoms during the daytime. Pt states she, "does not really
notice during the day...I can't really tell." Pt denies nausea during the day b
ut feels palpitations



Takes pulse during the day and describes it as "real erratic type of beats" feel
s irregular "not real fast.. About 70s-80s"



Bp: "When I check my blood pressures sometimes it dips low 80s/40s other times a
real wide pulse pressure" Pt does not keep a log, therefore no exact numbers to 
report.



Denies sob and lightheaded/dizzy, but reports feeling "exhausted all the time"



Noted to pt her history of heart failure. Inquired if she is noticing swelling o
r signs of fluid retention.



Pt reports she "I got off the diuretic for a while but I got back on it 2-3 week
s ago because I noticed swelling"



Pt states her swelling is okay in the morning, but  in LE swell in the evenings;
abdomen "looks normal in the mornings, but my abdomen looks 6 mo pregnant by 2:
00 in the afternoon"



Pt weighs daily, but does not keep a log. Pt recalls she used to regularly weigh
in the 120s, but lately  Weighs in the 130s.



Provided pt with homework to check bp/pulse, and weights daily and to keep a log
/write on a calendar. Instructed pt to check bp/pulse extra with symptoms, inclu
ding during the night. Instructed to call for wt gain 2lbs/24hrs, 3lbs/48hrs, or
5lbs in a week... Provided education that extra fluid on the heart can cause ad
luzma stress to the heart. Pt v/u



Instructed pt call 911/have someone drive her to ED if having a low bp or fast H
R and symptoms (sob, light headed/dizzy, nauseated). Pt v/u.



Informed pt [facility] will be notified for recs, but also called schedulers to arrange
urgent appt [facility] tomorrow 18 check in at 1:30 p.m. With Dr. Hughes.



Pt would like any ordered labs to be faxed to [facility] in [place], [place] pt voiced
agreement with plans.



Will route to [facility].







Electronically signed by Loulou Acevedo RN at 2018 11:35 AM CDT

documented in this encounter



Plan of Treatment





Not on filedocumented as of this encounter



Procedures





                                        Comments



                 Procedure Name  Priority        Date/Time       Associated Diag

nosis 

 

                                        



Results for this procedure are in the results section.



                 NTPROBNP        Routine         2018      Other fatigue 



                                         Palpitations 



                                         Other chest pain 

 

                                        



Results for this procedure are in the results section.



                 MAGNESIUM       Routine         2018      Other fatigue 



                                         Palpitations 



                                         Other chest pain 

 

                                        



Results for this procedure are in the results section.



                 CBC AND DIFF (MANUAL DIFF  Routine         2018      Othe

r fatigue 



                           IF NECESSARY)             Palpitations 



                                         Other chest pain 

 

                                        



Results for this procedure are in the results section.



                 BASIC METABOLIC PANEL  Routine         2018      Other fa

tigue 



                                         Palpitations 



                                         Other chest pain 



documented in this encounter



Results

* NTproBNP (2018)



    



              Component    Value        Ref Range    Performed At  Pathologist



                                         [facility]

 

    



                 NTproBNP        142 (A)         0 - 100         [facility] 











                                         Specimen

 





                                         Blood







   



                 Performing Organization  Address         City/State/[facility]

one Number

 

   



                     [facility]           [address] [place] [address]

52 





* Magnesium (2018)



    



              Component    Value        Ref Range    Performed At  Pathologist



                                         Signature

 

    



                 Magnesium       2.1             1.6 - 2.6 mg/dL  [facility] 











                                         Specimen

 





                                         Blood







   



                 Performing Organization  Address         City/State/[facility]

one Number

 

   



                     [facility]           [address] [place] [address]

52 





* CBC and Diff (manual diff if necessary) (2018)



    



              Component    Value        Ref Range    Performed At  Pathologist



                                         Signature

 

    



                 RBC             4.33            3.6 - 5.00      [facility] 

 

    



                           Hematology                [facility] 



                                         Comments    

 

    



                 WBC             5.19            5.0 - 10.0      [facility] 

 

    



                 Hemoglobin      13.4            12.0 - 16.0 g/dL  [facility] 

 

    



                 Hematocrit      41              36 - 46 %       [facility] 

 

    



                 MCV             94.5            82.0 - 108.0 fL  [facility] 

 

    



                 MCH             30.9            26.0 - 34.0 pg  [facility] 

 

    



                 MCHC            32              30 - 37 g/dL    [facility] 

 

    



                 % Neutrophils   63              37 - 80 %       [facility] 

 

    



                           % Imm Grans               [facility] 

 

    



                 %Lymphocytes    27              10 - 50 %       [facility] 

 

    



                 %Monocytes      8               0 - 12 %        [facility] 

 

    



                 %Eosinophils    2               0 - 6 %         [facility] 

 

    



                 %Basophils      0               0 - 3 %         [facility] 

 

    



                 Platelet Count  199             150 - 399 K/L  [facility] 

 

    



                           # Granulocytes            [facility] 

 

    



                           Immature Grans            [facility] 



                                         (Abs)    

 

    



                 Neutrophils     3.27            2.0 - 6.9       [facility] 



                                         Absolute    

 

    



                 # Lymphocytes   1.40            0.60 - 3.40 /L  [facility] 

 

    



                 # Monocytes     0.40            0.20 - 0.80 /L  [facility] 

 

    



                 # Eosinophils   0.10 (A)        0.20 - 0.70 /L  [facility] 

 

    



                 # Basophils     0.00            0.00 - 0.20 /L  [facility] 

 

    



                     Nucleated RBCs      0 - 2 /100          [facility] 

 

    



                 RDW             13.8            11.5 - 14.5 %   [facility] 











                                         Specimen

 





                                         Blood







 



                           Narrative                 Performed At

 

 



                                         This result has an attachment that is n

ot available. 







   



                 Performing Organization  Address         City/State/[facility]

one Number

 

   



                     [facility]           [address] [id number]

52 





* Basic Metabolic Panel (2018)



    



              Component    Value        Ref Range    Performed At  Pathologist



                                         Signature

 

    



                 Calcium         8.8             8.5 - 10.8 mg/dL  [facility] 

 

    



                 Glucose         164 (A)         60 - 125 mg/dL  [facility] 

 

    



                 Blood Urea      14              9 - 27 mg/dL    [facility] 



                                         Nitrogen    

 

    



                 Potassium       4.2             3.4 - 5.3 mmol/L  [facility] 

 

    



                 Sodium          140             132 - 145 mmol/L  [facility] 

 

    



                 Chloride        101             95 - 110 mmol/L  [facility] 

 

    



                 Creatinine      0.8             0.6 - 1.5 mg/dL  [facility] 

 

    



                 Carbon Dioxide  31              24 - 34 mmol/L  [facility] 

 

    



                 BUN/Creatinine  17.3            12 - 20         [facility] 



                                         Ratio    

 

    



                           eGFR If                   [facility] 



                                         NonAfricn Am    

 

    



                           eGFR If Africn            [facility] 



                                         Am    











                                         Specimen

 





                                         Blood







 



                           Narrative                 Performed At

 

 



                                         This result has an attachment that is n

ot available. 







   



                 Performing Organization  Address         City/State/Zipcode  Ph

one Number

 

   



                     [facility]           [address] [address]

52 





documented in this encounter



Visit Diagnoses









                                         Diagnosis

 





                                         Other fatigue

 





                                         Palpitations

 





                                         Other chest pain



documented in this encounter

## 2020-03-13 NOTE — XMS REPORT
Encounter Summary

                             Created on: 2020



Alvina Charles

External Reference #: [id number]

: 1935

Sex: Female



Demographics





                          Address                   [address] [place]  [address]

 

                          Home Phone                [phone]

 

                          Preferred Language        English

 

                          Marital Status            

 

                          Caodaism Affiliation     1013

 

                          Race                      White

 

                          Ethnic Group              Not  or 





Author





                          Author                    [facility]

 

                          Organization              [facility]

 

                          Address                   Unknown

 

                          Phone                     Unavailable







Support





                Name            Relationship    Address         Phone

 

                Krista Honeycutt   ECON            Unknown         [phone]







Care Team Providers





                    Care Team Member Name Role                Phone

 

                    Payam Hutchison   PCP                 [phone]







Reason for Visit

* 



 



                           Reason                    Comments

 

 



                           Lab results               bnp, thyroid results









Encounter Details





                          Care Team                 Description



                     Date                Type                Department  

 

                                        



Cydney New RN                    Lab results (bnp, thyroid results)



                     2017          Telephone           [facility]  



                                         [facility]  



                                         [facility]  



                                         [address]  



                                         [address]  



                                         [place] [address]  



                                         [phone]  







Social History





                                        Date



                 Tobacco Use     Types           Packs/Day       Years Used 

 

                                         



                                         Never Smoker    

 

    



                                         Smokeless Tobacco: Never   



                                         Used   







                    Drinks/Week         oz/Week             Comments



                                         Alcohol Use   

 

                                                             



                                         No   







 



                           Sex Assigned at Birth     Date Recorded

 

 



                                         Not on file 







                                        Industry



                           Job Start Date            Occupation 

 

                                        Not on file



                           Not on file               Not on file 







                                        Travel End



                           Travel History            Travel Start 

 





                                         No recent travel history available.



documented as of this encounter



Miscellaneous Notes

* Telephone Encounter - Cydney New RN - 2017  5:09 PM CST



3/9 call to patient to inform of lab results, was able to leave a detailed messa
ge.  Will send results toPCP



Electronically signed by Cydney New RN at 2017  5:10 PM CST

* Telephone Encounter - Cydney New RN - 2017  5:08 PM CST



----- Message from Sammie Arellano MD sent at 3/8/2017  4:38 PM CST -----

Labwork all looks good, how is she doing with low dose diuretic?



Electronically signed by Cydney New RN at 2017  5:08 PM CST

documented in this encounter



Plan of Treatment





Not on filedocumented as of this encounter



Visit Diagnoses

Not on filedocumented in this encounter

## 2020-03-13 NOTE — XMS REPORT
Encounter Summary

                             Created on: 2020



Alvina Charles

External Reference #: [id number]

: 1935

Sex: Female



Demographics





                          Address                   [address] [place]  [address]

 

                          Home Phone                [phone]

 

                          Preferred Language        English

 

                          Marital Status            

 

                          Samaritan Affiliation     1013

 

                          Race                      White

 

                          Ethnic Group              Not  or 





Author





                          Author                    [facility]

 

                          Organization              [facility]

 

                          Address                   Unknown

 

                          Phone                     Unavailable







Support





                Name            Relationship    Address         Phone

 

                Krista Honeycutt   [employer]            Unknown         [phone]







Care Team Providers





                    Care Team Member Name Role                Phone

 

                    Payam Hutchison   PCP                 [phone]







Reason for Referral

* MRI/CAT/PET Scan (Routine)



                          Referred By Contact       Referred To Contact



                 Status          Reason          Specialty       Diagnoses /  



                                         Procedures  

 

                                        



Mariam Nguyen MD



[address] [facility] 



[place] [address]



Phone: [phone]



Fax: [phone]                       



[facility]



[address] [place] [address]



Phone: [phone]



Fax: [phone]



                     Canceled            Radiology           Diagnoses  



                                         Mitral valve  



                                         insufficiency,  



                                         unspecified  



                                         etiology  



                                         Exertional dyspnea

  



                                         P  



                                         rocedures  



                                         CV MRI Cardiac w  



                                         wo contrast  











Reason for Visit

* 



 



                           Reason                    Comments

 

 



                                         Follow-up 

 

 



                                         Congestive Heart Failure 









Encounter Details





                          Care Team                 Description



                     Date                Type                Department  

 

                                        



Mariam Nguyen MD



[address] [facility] 



[place] [address]



[phone] (Fax)                      Mitral valve insufficiency, unspecified 

etiology (Primary 

Dx); 

Exertional dyspnea



                     2018          Office Visit        [facility]  



                                         [facility]  



                                         [facility]  



                                         [address] [facility]  



                                         [address]   



                                         [place] [address]  



                                         [phone]  







Social History





                                        Date



                 Tobacco Use     Types           Packs/Day       Years Used 

 

                                         



                                         Never Smoker    

 

    



                                         Smokeless Tobacco: Never   



                                         Used   







                    Drinks/Week         oz/Week             Comments



                                         Alcohol Use   

 

                                                             



                                         No   







 



                           Sex Assigned at Birth     Date Recorded

 

 



                                         Not on file 







                                        Industry



                           Job Start Date            Occupation 

 

                                        Not on file



                           Not on file               Not on file 







                                        Travel End



                           Travel History            Travel Start 

 





                                         No recent travel history available.



documented as of this encounter



Last Filed Vital Signs





                    Reading             Time Taken          Comments



                                         Vital Sign   

 

                    137/71              2018 10:00 AM CDT  



                                         Blood Pressure   

 

                    70                  2018 10:00 AM CDT reg



                                         Pulse   

 

                    -                   -                    



                                         Temperature   

 

                    -                   -                    



                                         Respiratory Rate   

 

                    -                   -                    



                                         Oxygen Saturation   

 

                    -                   -                    



                                         Inhaled Oxygen   



                                         Concentration   

 

                    62.1 kg (137 lb)    2018 10:00 AM CDT  



                                         Weight   

 

                    157.5 cm (5' 2")    2018 10:00 AM CDT  



                                         Height   

 

                    25.06               2018 10:00 AM CDT  



                                         Body Mass Index   



documented in this encounter



Patient Instructions

* Patient Instructions* 



 Cydney Mullins RN - 2018 10:20 AM CDT



Your doctor has ordered the following test(s): MRI to be scheduled at your earli
est convenience.   A nurse will contact you with the results and your doctor's r
ecommendations approximately 7-10 business days after the test has been complete
d. 



Medication Instructions: Metoprolol Tartrate 12.5 mg as needed for bothersome ec
topy or palpitations. 



Follow up with MD Dr. Mariam Nguyen based on test results. 

 

Please call the Nurse Line at [phone] ([facility] Team). with any questions
or concerns. For medication refill needs, please first contact your pharmacy. 



For any [facility] scheduling questions, please andreia carbajal [phone]. 



At [facility], high quality patient care is our top priority.  To ensure our
cardiovascular standards are continuously met, you may receive a survey via ema
il or text message, and we ask that you please take the time to fill it out. We 
strive to ensure you are very satisfied with every visit.  Thank you in advance 
for taking the time to fill this out. 



It was a pleasure to meet you.  







Electronically signed by Cydney Mullins RN at 2018 11:02 AM CDT





documented in this encounter



Progress Notes

* Mariam Nguyen MD - 2018 10:20 AM CDT



Formatting of this note might be different from the original.

[facility]



Appointment Date: 2018



Payam Hutchison DO

[address] [place] [address]



RE:  Alvina Charles

:   1935  

Visit provider:  Mariam Nguyen MD



Dear Payam Hutchison DO,



I had the pleasure of seeing Alvina Charles in the office today. She is a(n) 83 y.o. 
female and presents with the following chief complaint(s): Follow-up and [place]
sarah Heart Failure



HPI:  

Ms. Charles is an 83-year-old female whom we have followed with a history of arias
tolic dysfunction and history of mitral regurgitation.  She has been traditional
ly followed by my colleague, Dr. Sammie Arellano.  



Recently, Ms. Charles underwent cardiac evaluation in [place] for worsening fatigu
e and ectopy.  A Holter monitor documented sinus rhythm with occasional PACs and
PVCs.  A transthoracic echocardiogram documented normal systolic function and n
ormal chamber dimensions with moderate to severe mitral regurgitation present.



Today, she continues to report general fatigue.  She does note ectopy that is jaydon
thersome when she lies down.  She does have dyspnea on exertion.  The recommenda
tions at the time of her last visit was for a SIXTO to further evaluate her mitral
valve.  However, it was felt she has contraindications to SIXTO due to an epiglot
tis situation.



She denies dyspnea at rest, orthopnea, PND, lower extremity edema, syncope, liz
dication or stroke.  She does take Lasix on an occasional basis.



 

Patient Active Problem List 

Diagnosis SNOMED CT(R) 

 Hypothyroidism HYPOTHYROIDISM 

 Fatigue FATIGUE 

 Exertional dyspnea DYSPNEA ON EXERTION 

 Pulmonary hypertension (HCC) PULMONARY HYPERTENSION 

 Multiple sclerosis (HCC) MULTIPLE SCLEROSIS 

 Mitral regurgitation MITRAL VALVE REGURGITATION 

 Family history of coronary artery disease FAMILY HISTORY OF CORONARY ARTERIO
SCLEROSIS 

 Chronic diastolic congestive heart failure (HCC) CHRONIC DIASTOLIC HEART MYCHAL
LURE 



Past Medical History: 

Diagnosis Date 

 Chest pain  

 Chronic diastolic congestive heart failure (HCC)  

 Edema  

 Lower ext 

 Exertional dyspnea  

 Family history of coronary artery disease  

 Hypothyroidism  

 Mitral regurgitation  

 Mitral valve prolapse 3/13/2017 

 Multiple sclerosis (HCC)  

 Pulmonary hypertension (HCC) 2015 

 Moderate per Echo 



Past Surgical History: 

Procedure Laterality Date 

 APPENDECTOMY   

 CARDIAC CATHETERIZATION  2016 

 No angiographic evidence of coronary artery disease. ([email or web address]) 

 HYSTERECTOMY   

 RIGHT HEART CATH  2016 

 RA: 7. RV: 41/9. PA: 38/11/20. Wedge: 15 with V-waves to 25. LV: 143/13, Ascend
ing aortic pressure: 145/61. CO/CI (Sanna): 3.6/2.1. LVEF 55%. ([facility]) 

 TONSILLECTOMY   



Final Medications:

Current Outpatient Prescriptions 

Medication Sig Dispense Refill 

 thyroid, pork, (ARMOUR) 60 mg Tab tablet Take 90 mg by mouth daily.    

 metoprolol tartrate (LOPRESSOR) 25 MG tablet Take 0.5 tablets (12.5 mg total
) by mouth daily as needed. For bothersome ectopy or palpitations. 45 tablet 3 



No current facility-administered medications for this visit.  



Allergies 

Allergen Reactions 

 Edgard-1 Anaphylaxis 

  All MIRANDA--lidocaine, novacaine............. 

 Lasix [Furosemide] Anaphylaxis 

 Naltrexone Analogues  



Family History 

Problem Relation Age of Onset 

 Heart attack Father  

 Kidney cancer Brother  

 Melanoma Brother  



Social History:

Social History 

Substance Use Topics 

 Smoking status: Never Smoker 

 Smokeless tobacco: Never Used 

 Alcohol use No 



Review of Systems 

Constitution: Positive for malaise/fatigue. Negative for fever and night sweats.
 

HENT: Negative for nosebleeds.  

Cardiovascular: Positive for dyspnea on exertion and leg swelling. Negative for 
chest pain, claudication, cyanosis, irregular heartbeat, near-syncope, orthopnea
, palpitations, paroxysmal nocturnal dyspnea and syncope. 

Respiratory: Positive for shortness of breath. Negative for cough, hemoptysis, s
leep disturbances due to breathing, snoring, sputum production and wheezing.  

Endocrine: Positive for cold intolerance. Negative for polydipsia. 

Hematologic/Lymphatic: Does not bruise/bleed easily. 

Skin: Negative for rash. 

Musculoskeletal: Negative for joint pain and myalgias. 

Gastrointestinal: Negative for dysphagia, hematochezia, nausea and vomiting. 

Genitourinary: Negative for hematuria. 

Neurological: Positive for loss of balance. Negative for disturbances in coordin
ation, excessive daytime sleepiness, dizziness, focal weakness, headaches, light
-headedness, numbness and paresthesias. 

All other systems reviewed and are negative.



Vital Signs 

 18 1000 

BP: 137/71 

Pulse: 70 

Weight: 62.1 kg (137 lb) 

Height: 1.575 m (5' 2") 

 

BMI: Body mass index is 25.06 kg/m.



Physical Exam 

Constitutional: She is oriented to person, place, and time. She appears well-dev
eloped. 

Eyes: Pupils are equal, round, and reactive to light. 

Neck: No JVD present. 

Cardiovascular: Normal rate.  

Murmur heard.

Pulmonary/Chest: Effort normal and breath sounds normal. 

Abdominal: Soft. 

Musculoskeletal: She exhibits no edema. 

Neurological: She is alert and oriented to person, place, and time. 

Skin: Skin is warm. 

Psychiatric: She has a normal mood and affect. 

Vitals reviewed.



Cholesterol (no units) 

Date Value 

2016 158 



Triglycerides (mg/dL) 

Date Value 

2016 80 



LDL Cholesterol 

Date/Time Value Ref Range Status 

2016 102 mg/dL Final 



Encounter Diagnoses 

Name Primary? 

 Mitral valve insufficiency, unspecified etiology Yes 

 Exertional dyspnea  



Impression:

1.  Constitutional symptoms of general fatigue, dyspnea on exertion and symptoma
tic ectopy.  On her exam today, she does have the expected systolic murmur with 
suspect PACs and PVCs on exam.  Her underlying rhythm was regular.

2.  Mitral regurgitation.  Her last assessment revealed normal systolic function
 with normal LV dimensions and moderate to severe mitral regurgitation with norm
al atrial dimensions.  Her pulmonary pressure was 45 mmHg.

3.  Diastolic dysfunction without need for daily diuretic therapy.  Her blood pr
essure is under good control.

4.  Remote history of type II MI with normal coronary arteries per angiogram.



Plan:

1.  I did review risk factors and updates on preventive strategies.

2.  I did concur with recommendations for consideration of a low-dose beta block
er.  She is quite hesitant about it impacting her blood pressure.  She is agreea
ble to a very low dose of metoprolol tartrate 25 mg with taking a one-half table
t more on a p.r.n. basis first for observation of associated improvement in symp
toms.

3.  In that a SIXTO is felt to be contraindicated at this time, I have suggested a
 cardiac MRI, which she is interested in pursuing.  I will review these results 
with her with further recommendations.



Treatment goals, progress and next steps, as above, were discussed and mutually 
agreed upon with the patient/family.  



Thank you for allowing me to participate in Alvina Charles's care.  If I can be of an
y further assistance, please do not hesitate to contact me.



Sincerely,

Mariam Nguyen MD

/cms



Electronically signed by Mariam Nguyen MD at 2018  9:56 AM CDT

documented in this encounter



Plan of Treatment





                                        Order Schedule



                 Name            Type            Priority        Associated Diag

noses 

 

                                        1 Occurrences starting 2018 until 

2019



                 CV MRI Cardiac w wo  Cardiac         Routine         Mitral catarina

ve 



                     contrast            Services            insufficiency, 



                                         unspecified etiology 



                                         Exertional dyspnea 



documented as of this encounter



Visit Diagnoses









                                         Diagnosis

 





                                         Mitral valve insufficiency, unspecified

 etiology

 





                                         Exertional dyspnea



                                         Other dyspnea and respiratory abnormali

ty



documented in this encounter

## 2020-03-13 NOTE — XMS REPORT
Clinical Summary

                             Created on: 2020



Alvina Charles

External Reference #: [id number]

: 1935

Sex: Female



Demographics





                          Address                   [address] [place]  [address]

 

                          Home Phone                [phone]

 

                          Preferred Language        English

 

                          Marital Status            

 

                          Jew Affiliation     [id number]

 

                          Race                      White

 

                          Ethnic Group              Not  or 





Author





                          Author                    [facility]

 

                          Organization              [facility]

 

                          Address                   Unknown

 

                          Phone                     Unavailable







Support





                Name            Relationship    Address         Phone

 

                Krista Honeycutt   [employer]            Unknown         [phone]







Care Team Providers





                    Care Team Member Name Role                Phone

 

                    Payam Hutchison   PCP                 [phone]







Allergies





                                        Comments



                 Active Allergy  Reactions       Severity        Noted Date 

 

                                        



All MIRANDA--lidocaine, novacaine.............



                 Edgard-1         Anaphylaxis     High            2017 

 

                                         



                 Furosemide      Anaphylaxis     High            2016 

 

                                         



                           Naltrexone Analogues      2016 







Medications





                          End Date                  Status



              Medication   Sig          Dispensed    Refills      Start  



                                         Date  

 

                                                    Active



                     thyroid, pork, (ARMOUR)  Take 90 mg by       0   



                           60 mg Tab tablet          mouth daily.     







Active Problems





 



                           Problem                   Noted Date

 

 



                           Mitral regurgitation      2017

 

 



                           Pulmonary hypertension    2015

 

 



                                         Overview:



                                         Moderate per Echo

 

 



                                         Hypothyroidism 

 

 



                                         Fatigue 

 

 



                                         Exertional dyspnea 

 

 



                                         Multiple sclerosis 

 

 



                                         Family history of coronary artery disea

se 

 

 



                                         Chronic diastolic congestive heart fail

ure 







Resolved Problems





  



                     Problem             Noted Date          Resolved Date

 

  



                     Mitral valve prolapse  2017







Family History





   



                 Medical History  Relation        Name            Comments

 

   



                           Kidney cancer             Brother  

 

   



                           Melanoma                  Brother  

 

   



                           Heart attack              Father  







   



                 Relation        Name            Status          Comments

 

   



                           Brother                    



                                         (Age 5) 

 

   



                           Brother                   Alive 

 

   



                           Father                     



                                         (Age 88) 

 

   



                           Mother                     



                                         (Age 83) 







Social History





                                        Date



                 Tobacco Use     Types           Packs/Day       Years Used 

 

                                         



                                         Never Smoker    

 

    



                                         Smokeless Tobacco: Never   



                                         Used   







                    Drinks/Week         oz/Week             Comments



                                         Alcohol Use   

 

                                                             



                                         No   







 



                           Sex Assigned at Birth     Date Recorded

 

 



                                         Not on file 







                                        Industry



                           Job Start Date            Occupation 

 

                                        Not on file



                           Not on file               Not on file 







                                        Travel End



                           Travel History            Travel Start 

 





                                         No recent travel history available.







Last Filed Vital Signs





                    Reading             Time Taken          Comments



                                         Vital Sign   

 

                    113/59              2019  1:41 PM CDT lowest



                                         Blood Pressure   

 

                    80                  2019  1:40 PM CDT  



                                         Pulse   

 

                    -                   -                    



                                         Temperature   

 

                    -                   -                    



                                         Respiratory Rate   

 

                    -                   -                    



                                         Oxygen Saturation   

 

                    -                   -                    



                                         Inhaled Oxygen   



                                         Concentration   

 

                    61.7 kg (136 lb)    2019  1:40 PM CDT  



                                         Weight   

 

                    157.5 cm (5' 2")    2019  1:40 PM CDT  



                                         Height   

 

                    24.87               2019  1:40 PM CDT  



                                         Body Mass Index   







Plan of Treatment





   



                 Health Maintenance  Due Date        Last Done       Comments

 

   



                           Medicare Annual Wellness  1935  

 

   



                           Td #                      1935  

 

   



                           Zoster Vaccine# (1 of 2)  1986  

 

   



                           Depression Screening      2001  



                                         PHQ-9 #   

 

   



                           Fall Risk Assessment #    2001  

 

   



                           Osteoporosis Screening    2001  

 

   



                           Pneumococcal Vaccine: 65+  2001  



                                         Years (1 of 2 - PCV13)   

 

   



                           Influenza Vaccine (Season  2020  



                                         Ended)   







Results

Not on filefrom Last 3 Months



Insurance





                                        Type



            Payer      Benefit    Subscriber ID  Effective  Phone      Address 



                           Plan /                    Dates   



                                         Group     

 

                                        Medicare



              MEDICARE     MEDICARE     xxxxxxxxxx   Effective    [place] 



                     [address]            for [place], [place] 



                                         dates   

 

                                         



                 COMMERCIAL-NONCONTRACTED  MISC            xxxxxxxxx       2016-   



                           COMMERCIAL                Present   



                                         NONCONTRAC     



                                         [place]     

 

                                         



                 MEDICARE REPLACEMENT PLAN  HOME CARE       xxxxxxxxx       -   



                           MDCR                      Present   



                                         REPLACEMEN     



                                         T MISC PPS     







     



            Guarantor Name  Account    Relation to  Date of    Phone      Arya thomas Address



                     Type                Patient             Birth  

 

     



            Alvina Charles  Personal/F  Self       1935  [phone]  [address]

[address]               (Home)              [place] [address]

 

     



            Alvina Charles  Personal/F  Self       1935  [phone]  [address]

[address]               (Home)              [place] [address]

 

     



            Alvina Charles  Personal/F  Self       1935  [phone]  [address]

[address]               (Home)              [place] [address]







Advance Directives





For more information, please contact: [phone]







                          Patient Representative    Explanation



                           Type                      Date Recorded  

 

                                                     



                                         Advance Directives   



                                         and Living Will   

 

                                                     



                                         Power of    

 

                                                     



                                         Health Care   



                                         Directive

## 2020-03-13 NOTE — XMS REPORT
Encounter Summary

                             Created on: 2020



Alvina Charles

External Reference #: [id number]

: 1935

Sex: Female



Demographics





                          Address                   [address] [place]  [address]

 

                          Home Phone                [phone]

 

                          Preferred Language        English

 

                          Marital Status            

 

                          Mormonism Affiliation     [id number]

 

                          Race                      White

 

                          Ethnic Group              Not  or 





Author





                          Author                    [facility]

 

                          Organization              [facility]

 

                          Address                   Unknown

 

                          Phone                     Unavailable







Support





                Name            Relationship    Address         Phone

 

                Krista Honeycutt   [employer]            Unknown         [phone]







Care Team Providers





                    Care Team Member Name Role                Phone

 

                    Payam Hutchison   PCP                 [phone]







Reason for Visit

* 



 



                           Reason                    Comments

 

 



                                         test questions 









Encounter Details





                          Care Team                 Description



                     Date                Type                Department  

 

                                        



Jaida Myers, RN                   test questions



                     2019          Telephone           [facility]  



                                         [facility]  



                                         [facility]  



                                         [address]  



                                         [address]  



                                         [place] [address]  



                                         [phone]  







Social History





                                        Date



                 Tobacco Use     Types           Packs/Day       Years Used 

 

                                         



                                         Never Smoker    

 

    



                                         Smokeless Tobacco: Never   



                                         Used   







                    Drinks/Week         oz/Week             Comments



                                         Alcohol Use   

 

                                                             



                                         No   







 



                           Sex Assigned at Birth     Date Recorded

 

 



                                         Not on file 







                                        Industry



                           Job Start Date            Occupation 

 

                                        Not on file



                           Not on file               Not on file 







                                        Travel End



                           Travel History            Travel Start 

 





                                         No recent travel history available.



documented as of this encounter



Miscellaneous Notes

* Telephone Encounter - Júnior Salazar LPN - 2019  3:57 PM CDT



19 Return call to pt's daughter Krista. Stated to Krista the dye is nessary and
if the pt does not want the test then it can be canceled. Stated to Krista the dye
is usually out of the body in 24 to 48 hours. Krista states their are a lot of law
suits over gadolinium poisoning . Krista states they will let the pt know and most
likely will not do the test.



Electronically signed by Júnior Salazar LPN at 2019  4:08 PM CDT

* Telephone Encounter - Mariam Nguyen MD - 2019  7:52 AM CDT



To get full eval with MRI should consider.

If she is hesitant, dc test



Electronically signed by Mariam Nguyen MD at 2019  7:52 AM CDT

* Telephone Encounter - Jaida Myers, RN - 2019 10:29 AM CDT



Returned call to pt's daughter after receiving voicemail that states patient manjeet
ld not like to receive the gadolinium dye during the Cardiac MRI. She is concern
ed about to possible side effects of it getting to her brain, and states she alr
giovanny is having trouble with her brain and prefers to not receive the dye. Explai
smith the purpose of the dye within the Cardiac MRI, and that in order to evaluate
the heart fully including the perfusion, the dye is necessary, and unsure how h
elpful the MRI would be without the dye component. She would like this to be run
by Dr. Nguyen to see if able to gain necessary information without the use of 
the dye. Routed to Dr. Nguyen.



Electronically signed by Jaida Myers RN at 2019 10:36 AM CDT

documented in this encounter



Plan of Treatment





Not on filedocumented as of this encounter



Visit Diagnoses

Not on filedocumented in this encounter

## 2020-03-13 NOTE — XMS REPORT
Encounter Summary

                             Created on: 2020



Alvina Charles

External Reference #: [id number]

: 1935

Sex: Female



Demographics





                          Address                   [address] [place]  [address]

 

                          Home Phone                [phone]

 

                          Preferred Language        English

 

                          Marital Status            

 

                          Rastafarian Affiliation     [id number]

 

                          Race                      White

 

                          Ethnic Group              Not  or 





Author





                          Author                    [facility]

 

                          Organization              [facility]

 

                          Address                   Unknown

 

                          Phone                     Unavailable







Support





                Name            Relationship    Address         Phone

 

                Krista Honeycutt   [employer]            Unknown         [phone]







Care Team Providers





                    Care Team Member Name Role                Phone

 

                    Payam Hutchison   PCP                 [phone]







Encounter Details





                          Care Team                 Description



                     Date                Type                Department  

 

                                        



Lori Wakefield RN                   



                     2018          Abstract            [facility]  



                                         [facility]  



                                         [facility]  



                                         [address]  



                                         [address]  



                                         [place] [address]  



                                         [phone]  







Social History





                                        Date



                 Tobacco Use     Types           Packs/Day       Years Used 

 

                                         



                                         Never Smoker    

 

    



                                         Smokeless Tobacco: Never   



                                         Used   







                    Drinks/Week         oz/Week             Comments



                                         Alcohol Use   

 

                                                             



                                         No   







 



                           Sex Assigned at Birth     Date Recorded

 

 



                                         Not on file 







                                        Industry



                           Job Start Date            Occupation 

 

                                        Not on file



                           Not on file               Not on file 







                                        Travel End



                           Travel History            Travel Start 

 





                                         No recent travel history available.



documented as of this encounter



Plan of Treatment





Not on filedocumented as of this encounter



Visit Diagnoses

Not on filedocumented in this encounter

## 2020-03-13 NOTE — XMS REPORT
Encounter Summary

                             Created on: 2020



Ran Bansal

External Reference #: [id number]

: 1935

Sex: Female



Demographics





                          Address                   [address] [place]  [address]

 

                          Home Phone                [phone]

 

                          Preferred Language        English

 

                          Marital Status            

 

                          Synagogue Affiliation     1013

 

                          Race                      White

 

                          Ethnic Group              Not  or 





Author





                          Author                    [facility]

 

                          Organization              [facility]

 

                          Address                   Unknown

 

                          Phone                     Unavailable







Support





                Name            Relationship    Address         Phone

 

                Krista Honeycutt   [employer]            Unknown         [phone]







Care Team Providers





                    Care Team Member Name Role                Phone

 

                    Payam Hutchison   PCP                 [phone]







Reason for Referral

* Diagnostic Imaging (Routine)



                          Referred By Contact       Referred To Contact



                 Status          Reason          Specialty       Diagnoses /  



                                         Procedures  

 

                                        



Mariam Nguyen MD



[address] [facility] 



[place] [address]



Phone: [phone]



Fax: [phone]                       



[facility]



[address] [place] [address]



Phone: [phone]



Fax: [phone]



                     Closed              Cardiology          Diagnoses  



                                         Swelling  



                                         Chest pain,  



                                         unspecified chest  



                                         pain type

  



                                         P  



                                         rocedures  



                                         CV MPI SPECT  



                                         Exercise  







* Diagnostic Imaging (Routine)



                          Referred By Contact       Referred To Contact



                 Status          Reason          Specialty       Diagnoses /  



                                         Procedures  

 

                                        



Mariam Nguyen MD



[address] [facility] 



[place] [address]



Phone: [phone]



Fax: [phone]                       







                           Closed                    Diagnoses  



                                         Swelling

  



                                         P  



                                         rocedures  



                                         Electrocardiogram  



                                         (ECG) without  



                                         magnet  











Reason for Visit

* 



 



                           Reason                    Comments

 

 



                                         Edema 









Encounter Details





                          Care Team                 Description



                     Date                Type                Department  

 

                                        



Mariam Nguyen MD



[address] [facility] 



[place] [address]



[phone] (Fax)                      Swelling (Primary Dx); 

Chest pain, unspecified chest pain type



                     2016          Initial consult     [facility]  



                                         [facility]  



                                         [facility]  



                                         [address] [facility]   



                                         [place] [address]  



                                         [phone]  







Social History





                                        Date



                 Tobacco Use     Types           Packs/Day       Years Used 

 

                                         



                                         Never Smoker    

 

    



                                         Smokeless Tobacco: Never   



                                         Used   







                    Drinks/Week         oz/Week             Comments



                                         Alcohol Use   

 

                                                             



                                         No   







 



                           Sex Assigned at Birth     Date Recorded

 

 



                                         Not on file 







                                        Industry



                           Job Start Date            Occupation 

 

                                        Not on file



                           Not on file               Not on file 







                                        Travel End



                           Travel History            Travel Start 

 





                                         No recent travel history available.



documented as of this encounter



Last Filed Vital Signs





                    Reading             Time Taken          Comments



                                         Vital Sign   

 

                    142/78              2016 10:55 AM CST  



                                         Blood Pressure   

 

                    64                  2016 10:55 AM CST  



                                         Pulse   

 

                    -                   -                    



                                         Temperature   

 

                    -                   -                    



                                         Respiratory Rate   

 

                    -                   -                    



                                         Oxygen Saturation   

 

                    -                   -                    



                                         Inhaled Oxygen   



                                         Concentration   

 

                    62.4 kg (137 lb 9.6 oz) 2016 10:55 AM CST  



                                         Weight   

 

                    157.5 cm (5' 2")    2016 10:55 AM CST  



                                         Height   

 

                    25.17               2016 10:55 AM CST  



                                         Body Mass Index   



documented in this encounter



Progress Notes

* Mariam Nguyen MD - 2016 10:57 AM CST



Formatting of this note might be different from the original.



    [facility]



Appointment Date: 2016



Payam Hutchison DO

[address] [place] [id number]



RE:  Ran Bansal

:   1935  

Visit provider:  Mariam Nguyen MD



Dear Payam Hutchison DO,



I had the pleasure of seeing Ran Bansal in the office today. She is a 80 y.o. fem
sania and presents with the following chief complaints: Edema



HPI:  

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
arding dyspnea on exertion, chest pain, and fatigue.  She denies a prior history
of cardiovascular disease.  She did have an echocardiogram performed in  in 
[place].  This reported normal systolic function with grade III diastolic dysfu
nction and pulmonary artery pressure of 40 mmHg.  Valvular function was normal. 
A cardiac catheterization was recommended at that time to exclude coronary sid
ry disease as the etiology for her symptoms.  She declined the cardiac catheteri
zation as she was concerned about potential complication risks, especially focus
ing on stroke.



She continues to note lifestyle affecting fatigue.  She notes marked dyspnea wit
h minimal exertion and can have intermittent associated symptoms of chest pressu
re and actual chest pain.  She denies these symptoms at rest.  She denies orthop
farshad, PND, lower extremity edema, palpitations, presyncope, syncope, or symptoms 
of stroke.  She has had recent laboratory that did not reveal anemia.  She does 
take thyroid supplementation and thyroid lab was normal as well.  She reports a 
remote history of an autoimmune disorder, though could not elaborate more on thi
s.  She states her blood pressure has been under excellent control.



 There is no problem list on file for this patient.



History reviewed. No pertinent past medical history.



History reviewed. No pertinent past surgical history.



Final Medications:

Current Outpatient Prescriptions 

Medication Sig Dispense Refill 

 thyroid, pork, (ARMOUR) 60 mg Tab tablet Take 60 mg by mouth daily.   



No current facility-administered medications for this visit. 



Allergies 

Allergen Reactions 

 Lasix [Furosemide] Anaphylaxis 

 Naltrexone Analogues  



Family History 

Problem Relation Age of Onset 

 Heart attack Father  

 Kidney cancer Brother  

 Melanoma Brother  



Social History:

History 

Substance Use Topics 

 Smoking status: Never Smoker  

 Smokeless tobacco: Never Used 

 Alcohol Use: No 



Review of Systems 

Constitution: Positive for malaise/fatigue. Negative for fever, weakness and nig
ht sweats. 

HENT: Negative for nosebleeds.  

Eyes: Negative for vision loss in left eye and vision loss in right eye. 

Cardiovascular: Positive for cyanosis, irregular heartbeat, leg swelling and pal
pitations. Negative for chest pain, claudication, dyspnea on exertion, near-sync
ope, orthopnea and syncope. 

Respiratory: Positive for snoring. Negative for cough, hemoptysis, shortness of 
breath, sleep disturbances due to breathing and wheezing.  

Endocrine: Positive for cold intolerance. Negative for polydipsia. 

Hematologic/Lymphatic: Does not bruise/bleed easily. 

Skin: Negative for rash. 

Musculoskeletal: Positive for myalgias. Negative for joint pain. 

Gastrointestinal: Negative for dysphagia, hematochezia, nausea and vomiting. 

Genitourinary: Negative for hematuria. 

Neurological: Negative for brief paralysis, disturbances in coordination, excess
sarah daytime sleepiness, dizziness, light-headedness, loss of balance and numbnes
s. 

Psychiatric/Behavioral: Negative for depression. 

All other systems reviewed and are negative.



Vital Signs 

 16 1055 

BP: 142/78 

Pulse: 64 

Height: 1.575 m (5' 2") 

Weight: 62.415 kg (137 lb 9.6 oz) 

 

BMI: Body mass index is 25.16 kg/(m^2).

Physical Exam 

Constitutional: She is oriented to person, place, and time. She appears well-dev
eloped. 

Eyes: Pupils are equal, round, and reactive to light. No scleral icterus. 

Neck: Neck supple. No JVD present. Carotid bruit is not present. 

Cardiovascular: Normal rate and normal heart sounds.  

Pulmonary/Chest: Breath sounds normal. 

Abdominal: Soft. There is no tenderness. 

Musculoskeletal: She exhibits no edema. 

Neurological: She is alert and oriented to person, place, and time. 

Skin: Skin is warm. 

Psychiatric: She has a normal mood and affect. 

Vitals reviewed.



No results found for: CHOL, LDL, HDL, TRIG

No results found for: LDLCHOL



EKG:  Sinus rhythm with PACs.



Encounter Diagnoses 

Name Primary? 

 Swelling Yes 

 Chest pain, unspecified chest pain type  



Impression:

Symptoms of limiting dyspnea on exertion with chest pressure and discomfort.  Ce
rtainly her symptomatology is suspicious for angina with coronary artery disease
 as the etiology.  The symptom of overwhelming fatigue that affects quality of l
okurtney in a previously active female also raises the question of anginal equivalent
, all of which warrant further cardiovascular evaluation.  Certainly, one may be
 suspicious of diastolic dysfunction in a postmenopausal female contributing to 
dyspnea on exertion, though the severity of her symptoms appears disproportionat
e.



Recommendations:

1.  I did spend the majority of the visit with her and her daughter discussing r
isk factors and preventive strategies.

2.  I have asked that she document blood pressure during her symptomatic episode
s.

3.  Lastly, we did discuss cardiac catheterization and respectfully she is quite
 hesitant.  She is agreeable to do an exercise nuclear stress test to address is
chemia.  I will review these results with her with further recommendations.  If 
this is normal, one may consider pulmonary function tests.  Another consideratio
n could be that of a cardiac MRI to exclude infiltrative disorder contributing t
o diastolic dysfunction.  Of note, the voltage on her EKG was normal.



Thank you for allowing me to participate in Ran Bansal's care.  If I can be of an
y further assistance, please do not hesitate to contact me.



Sincerely,

Mariam Nguyen MD



TLS/tmo







Electronically signed by Mariam Nguyen MD at 2016  3:07 PM CST

documented in this encounter



Plan of Treatment





                                        Order Schedule



                 Name            Type            Priority        Associated Diag

noses 

 

                                        1 Occurrences starting 2016 until 

2017



                 CV MPI SPECT Exercise  Cardiac         Routine         Swelling

 



                           Services                  Chest pain, unspecified 



                                         chest pain type 



documented as of this encounter



Procedures





                                        Comments



                 Procedure Name  Priority        Date/Time       Associated Diag

nosis 

 

                                        



Results for this procedure are in the results section.



                 ECG             Routine         2016      Swelling 



                                         11:03 AM CST  



documented in this encounter



Results

* Electrocardiogram (ECG) without magnet (2016 11:03 AM CST)







                                         Specimen

 









 



                           Narrative                 Performed At

 

 



                           This result has an attachment that is not available. 

 [facility]



                                                 [facility] 



                                                      

      



                                                      

      Test Date:  



                                         2016 



                                         Pat Name:   RAN BANSAL     

   Department:  [facility] 



                                         Patient ID:  [id number]      

   Room:     



                                         Gender:    Female      

    Technician:  Sharon Mireles 



                                         :     1935    

    Requested By: MARIAM NGUYEN 



                                         Order Number: [id number]      

  Reading MD:  Mariam Nguyen 



                                                      

   Measurements 



                                         Intervals          

     Axis      



                                         Rate:     70       

     P:      78 



                                         KS:      196      

     QRS:     -2 



                                         QRSD:     96       

     T:      12 



                                         QT:      404      

             



                                         QTc:     436      

             



                                                      

Interpretive Statements 



                                         SINUS RHYTHM 



                                         ATRIAL PREMATURE COMPLEX 



                                         No previous ECG available for compariso

n 



                                         Electronically Signed On 2016 17:3

2:01 CST by aMriam Nguyen 







   



                 Performing Organization  Address         City/State/Zipcode  Ph

one Number

 

   



                                         [facility]   





documented in this encounter



Visit Diagnoses









                                         Diagnosis

 





                                         Swelling



                                         Localized superficial swelling, mass, o

r lump

 





                                         Chest pain, unspecified chest pain type



documented in this encounter

## 2020-03-13 NOTE — XMS REPORT
Encounter Summary

                             Created on: 2020



Alvina Charles

External Reference #: [id number]

: 1935

Sex: Female



Demographics





                          Address                   [address] [place]  [address]

 

                          Home Phone                [phone]

 

                          Preferred Language        English

 

                          Marital Status            

 

                          Amish Affiliation     1013

 

                          Race                      White

 

                          Ethnic Group              Not  or 





Author





                          Author                    [facility]

 

                          Organization              [facility]

 

                          Address                   Unknown

 

                          Phone                     Unavailable







Support





                Name            Relationship    Address         Phone

 

                Krista Honeycutt   [employer]            Unknown         [phone]







Care Team Providers





                    Care Team Member Name Role                Phone

 

                    Payam Hutchison   PCP                 [phone]







Encounter Details





                          Care Team                 Description



                     Date                Type                Department  

 

                                        



Sammie Arellano MD



[address]



[address]



[place] [address] [phone] (Fax)                       



                     2016          Documentation       [facility]  



                                         [facility]  



                                         [facility]  



                                         [address]  



                                         [address]  



                                         [place] [address] [phone]  







Social History





                                        Date



                 Tobacco Use     Types           Packs/Day       Years Used 

 

                                         



                                         Never Smoker    

 

    



                                         Smokeless Tobacco: Never   



                                         Used   







                    Drinks/Week         oz/Week             Comments



                                         Alcohol Use   

 

                                                             



                                         No   







 



                           Sex Assigned at Birth     Date Recorded

 

 



                                         Not on file 







                                        Industry



                           Job Start Date            Occupation 

 

                                        Not on file



                           Not on file               Not on file 







                                        Travel End



                           Travel History            Travel Start 

 





                                         No recent travel history available.



documented as of this encounter



Plan of Treatment





Not on filedocumented as of this encounter



Visit Diagnoses

Not on filedocumented in this encounter

## 2020-03-13 NOTE — XMS REPORT
Encounter Summary

                             Created on: 2020



Alvina Charles

External Reference #: [id number]

: 1935

Sex: Female



Demographics





                          Address                   [address] [place]  [address]

 

                          Home Phone                [phone]

 

                          Preferred Language        English

 

                          Marital Status            

 

                          Moravian Affiliation     1013

 

                          Race                      White

 

                          Ethnic Group              Not  or 





Author





                          Author                    [facility]

 

                          Organization              [facility]

 

                          Address                   Unknown

 

                          Phone                     Unavailable







Support





                Name            Relationship    Address         Phone

 

                Krista Honeycutt   ECON            Unknown         [phone]







Care Team Providers





                    Care Team Member Name Role                Phone

 

                    Payam Hutchison   PCP                 [phone]







Reason for Visit

* 



 



                           Reason                    Comments

 

 



                                         Symptoms 









Encounter Details





                          Care Team                 Description



                     Date                Type                Department  

 

                                        



Hannah Mccormack RN                       Symptoms



                     2017          Telephone           [facility]  



                                         [facility]  



                                         [facility]  



                                         [address]  



                                         [address]  



                                         [place] [address]  



                                         [phone]  







Social History





                                        Date



                 Tobacco Use     Types           Packs/Day       Years Used 

 

                                         



                                         Never Smoker    

 

    



                                         Smokeless Tobacco: Never   



                                         Used   







                    Drinks/Week         oz/Week             Comments



                                         Alcohol Use   

 

                                                             



                                         No   







 



                           Sex Assigned at Birth     Date Recorded

 

 



                                         Not on file 







                                        Industry



                           Job Start Date            Occupation 

 

                                        Not on file



                           Not on file               Not on file 







                                        Travel End



                           Travel History            Travel Start 

 





                                         No recent travel history available.



documented as of this encounter



Miscellaneous Notes

* Telephone Encounter - Ronda Foreman - 2017  8:22 AM CDT



 this pt has been scheduled 3 different times and has called back to cancel
each time!  I will make another attempt at scheduling but can't guarantee that 
she will keep it.



Electronically signed by Ronda Foreman at 2017  8:26 AM CDT

* Telephone Encounter - Kvng Jean-Baptiste RN - 2017  5:06 PM CDT



Made call to patient with RUTHIE mitchell.  She v/u and states she has not taken her la
six since before her appointment with RUTHIE.  She states she is gald we called as 
she is feeling wiped out/so fatigued.  Her pressures have been low 80s/40s.  She
is having slight edema in her ankles.  SHe also stated that at the beginning of 
the day her stomach feels fine but then she feels like she is 6months pregnant 
by the end of the day.  Denies any dizziness, lightheadedness, CP, SOA.  States 
she is still having palpitations.  Discussed going to ER if symptoms worsen.  Pu
t pt on hold and made call to [facility].



Spoke with [facility] regarding symptoms.  BAA rec compression stockings, to take lasix
if SBP is in 90s, and to proceed to ER if symptoms worsen.  She states that we 
can do holter if palpitations get worse.  



Spoke again with patient and relayed message from BAA>  SHe v/u.  Will route to 
schedulers to set up appointment with BA>  Note that this is to be a 4wk f/u 
which would of been 17.  



Electronically signed by Kvng Jean-Baptiste RN at 2017  5:40 PM CDT

* Telephone Encounter - Sammie Arellano MD - 2017  1:48 PM CDT



Overall valve leakage appeared less prominent than on last study, pumping functi
on appeared normal.  The mitral valve does appear a little "floppy" and appears 
to leak moderately.  Because that valve leaks into the top of the heart, it can 
stir up extra heart beats or abnormal rhythms and if palpitations persist we can
do a holter monitor to evaluate.  If oxygen is helping would continue.  How oft
en is she using lasix?



Electronically signed by Sammie Arellano MD at 2017  1:50 PM CDT

* Telephone Encounter - Hannah Mccormack RN - 2017  9:33 AM CDT



Patient called stating she had noticed an increase in palpitations "over the pas
t few days".   Patient also stated she noticed having "black toe nails" over thi
s time as well.   Patient had taken herself off of her prescribed oxygen and whe
n she put herself back on oxygen - symptoms disapated.   Patient asking if RUTHIE h
brendan reviewed [facility] echo?  Indicated to her I gave her the copy yesterday
and will ask BATIFFANIE if she has reviewed echo and will discuss symptoms with BATIFFANIE.  
Patient did state she has noticed a decrease in swelling since starting Lasix.  appt changed from 2:00 to 9:50am per patient request.  Verbalized understand
ing of discussion.  Routed to BAA for review.  



Electronically signed by Hannah Mccormack RN at 2017  9:41 AM CDT

documented in this encounter



Plan of Treatment





Not on filedocumented as of this encounter



Visit Diagnoses

Not on filedocumented in this encounter

## 2020-03-13 NOTE — XMS REPORT
Encounter Summary

                             Created on: 2020



Alvina Charles

External Reference #: [id number]

: 1935

Sex: Female



Demographics





                          Address                   [address] [place]  [address]

 

                          Home Phone                [phone]

 

                          Preferred Language        English

 

                          Marital Status            

 

                          Advent Affiliation     [id number]

 

                          Race                      White

 

                          Ethnic Group              Not  or 





Author





                          Author                    [facility] Organization [facility]

 

                          Address                   Unknown

 

                          Phone                     Unavailable







Support





                Name            Relationship    Address         Phone

 

                Krista Honeycutt   EC[employer]            Unknown         [phone]







Care Team Providers





                    Care Team Member Name Role                Phone

 

                    Payam Hutchison   PCP                 [phone]







Encounter Details





                          Care Team                 Description



                     Date                Type                Department  

 

                                        



Sherly Valdivia RN                        



                     2016          Abstract            [facility]  



                                         [facility]  



                                         [facility]  



                                         [address]  



                                         [address]  



                                         [place] [address]  



                                         [phone]  







Social History





                                        Date



                 Tobacco Use     Types           Packs/Day       Years Used 

 

                                         



                                         Never Smoker    

 

    



                                         Smokeless Tobacco: Never   



                                         Used   







                    Drinks/Week         oz/Week             Comments



                                         Alcohol Use   

 

                                                             



                                         No   







 



                           Sex Assigned at Birth     Date Recorded

 

 



                                         Not on file 







                                        Industry



                           Job Start Date            Occupation 

 

                                        Not on file



                           Not on file               Not on file 







                                        Travel End



                           Travel History            Travel Start 

 





                                         No recent travel history available.



documented as of this encounter



Plan of Treatment





Not on filedocumented as of this encounter



Visit Diagnoses

Not on filedocumented in this encounter

## 2020-03-13 NOTE — XMS REPORT
Encounter Summary

                             Created on: 2020



Alvina Charles

External Reference #: [id number]

: 1935

Sex: Female



Demographics





                          Address                   [address] [place]  [address]

 

                          Home Phone                [phone]

 

                          Preferred Language        English

 

                          Marital Status            

 

                          Judaism Affiliation     1013

 

                          Race                      White

 

                          Ethnic Group              Not  or 





Author





                          Author                    [facility]

 

                          Organization              [facility]

 

                          Address                   Unknown

 

                          Phone                     Unavailable







Support





                Name            Relationship    Address         Phone

 

                Krista Honeycutt   [employer]            Unknown         [phone]







Care Team Providers





                    Care Team Member Name Role                Phone

 

                    Payam Hutchison   PCP                 [phone]







Encounter Details





                          Care Team                 Description



                     Date                Type                Department  

 

                                        



Mariam Nguyen MD



[address] 



[place] [address] [phone] (Fax)                       



                     12/15/2015          Documentation       [facility]  



                                         [facility]  



                                         [facility]  



                                         [address] [place] [address]  



                                         [address]  



                                         [place] [address] [phone]  







Social History





                                        Date



                 Tobacco Use     Types           Packs/Day       Years Used 

 

                                         



                                         Never Assessed    







 



                           Sex Assigned at Birth     Date Recorded

 

 



                                         Not on file 







                                        Industry



                           Job Start Date            Occupation 

 

                                        Not on file



                           Not on file               Not on file 







                                        Travel End



                           Travel History            Travel Start 

 





                                         No recent travel history available.



documented as of this encounter



Plan of Treatment





Not on filedocumented as of this encounter



Visit Diagnoses

Not on filedocumented in this encounter

## 2020-03-13 NOTE — XMS REPORT
Encounter Summary

                             Created on: 2020



IraidamariamAlvinaTuyet

External Reference #: [id number]

: 1935

Sex: Female



Demographics





                          Address                   [address] [place]  [address]

 

                          Home Phone                [phone]

 

                          Preferred Language        English

 

                          Marital Status            

 

                          Zoroastrianism Affiliation     1013

 

                          Race                      White

 

                          Ethnic Group              Not  or 





Author





                          Author                    [facility]

 

                          Organization              [facility]

 

                          Address                   Unknown

 

                          Phone                     Unavailable







Support





                Name            Relationship    Address         Phone

 

                Krista Honeycutt   [employer]            Unknown         [phone]







Care Team Providers





                    Care Team Member Name Role                Phone

 

                    Hutchison Payam   PCP                 [phone]







Reason for Visit

* 



 



                           Reason                    Comments

 

 



                           Procedure Instructions    SIXTO









Encounter Details





                          Care Team                 Description



                     Date                Type                Department  

 

                                        



Cydney New RN                    Procedure Instructions (SIXTO)



                     2017          Telephone           [facility]  



                                         [facility]  



                                         [address]  



                                         [address]  



                                         [place] [address]  



                                         [phone]  







Social History





                                        Date



                 Tobacco Use     Types           Packs/Day       Years Used 

 

                                         



                                         Never Smoker    

 

    



                                         Smokeless Tobacco: Never   



                                         Used   







                    Drinks/Week         oz/Week             Comments



                                         Alcohol Use   

 

                                                             



                                         No   







 



                           Sex Assigned at Birth     Date Recorded

 

 



                                         Not on file 







                                        Industry



                           Job Start Date            Occupation 

 

                                        Not on file



                           Not on file               Not on file 







                                        Travel End



                           Travel History            Travel Start 

 





                                         No recent travel history available.



documented as of this encounter



Miscellaneous Notes

* Telephone Encounter - Cydney New RN - 2017  4:33 PM CST



37 taught while in office and hard copy of instructions given.  , Sundeep manzo was not able to give a date/time/location before leaving. She is aware that
she will be contacted with this information. Note that blanks will need to be f
illed once information obtained:



Pt scheduled for [facility] __________, scheduled__________, to arrive at ________ orde
red by RUTHIE. 

Verified allergies, reviewed medicatons. EMAIL SENT TO [facility] PATIENT IS ANAPHLAXIS TO ALL MIRANDA

Provided following instructions: NPO after MN prior to procedure. 

Please hold the following medications: LASIX THE MORNING OF PROCEDURE, take all 
other medications as you normally would, though only with small sips of water.  

You will not be allowed to drive yourself home, so please make arrangements for 
a ride.  Your  is asked to remain in the waiting area until your procedure
has been completed.  

You should NOT drive or operate machinery for at least 12-14 hours following the
procedure, as there may be residual effects from the sedative. 

Following the procedure, avoid food and beverages, especially hot liquids, until
sensation has returned to the throat.  This usually occurs within an hour. 

Please contact [phone] nurse line with further questions. 

Last office visit 3/7/2016 

Orders entered in EPIC___________________ 

 Updated procedure log_______________________. 

Mailed instructions to patient --GIVEN TO PATIENT AT TIME OF VISIT 3/7/17.







Electronically signed by Cydney New RN at 2017  4:38 PM CST

documented in this encounter



Plan of Treatment





Not on filedocumented as of this encounter



Visit Diagnoses

Not on filedocumented in this encounter

## 2020-03-13 NOTE — XMS REPORT
Encounter Summary

                             Created on: 2020



Alvina Charles

External Reference #: [id number]

: 1935

Sex: Female



Demographics





                          Address                   [address] [place]  [address]

 

                          Home Phone                [phone]

 

                          Preferred Language        English

 

                          Marital Status            

 

                          Denominational Affiliation     1013

 

                          Race                      White

 

                          Ethnic Group              Not  or 





Author





                          Author                    [facility]

 

                          Organization              [facility]

 

                          Address                   Unknown

 

                          Phone                     Unavailable







Support





                Name            Relationship    Address         Phone

 

                Krista Honeycutt   EC[employer]            Unknown         [phone]







Care Team Providers





                    Care Team Member Name Role                Phone

 

                    Payam Hutchison   PCP                 [phone]







Reason for Visit

* 



 



                           Reason                    Comments

 

 



                                         test questions 









Encounter Details





                          Care Team                 Description



                     Date                Type                Department  

 

                                        



Jaida Myers, RN                   test questions



                     2019          Telephone           [facility]  



                                         [facility]  



                                         [facility]  



                                         [address]  



                                         [address]  



                                         [place] [address]  



                                         [phone]  







Social History





                                        Date



                 Tobacco Use     Types           Packs/Day       Years Used 

 

                                         



                                         Never Smoker    

 

    



                                         Smokeless Tobacco: Never   



                                         Used   







                    Drinks/Week         oz/Week             Comments



                                         Alcohol Use   

 

                                                             



                                         No   







 



                           Sex Assigned at Birth     Date Recorded

 

 



                                         Not on file 







                                        Industry



                           Job Start Date            Occupation 

 

                                        Not on file



                           Not on file               Not on file 







                                        Travel End



                           Travel History            Travel Start 

 





                                         No recent travel history available.



documented as of this encounter



Miscellaneous Notes

* Telephone Encounter - Júnior Salazar LPN - 2019  3:57 PM CDT



19 Return call to pt's daughter Krista. Stated to Krista the dye is nessary and
if the pt does not want the test then it can be canceled. Stated to Krista the dye
is usually out of the body in 24 to 48 hours. Krista states their are a lot of law
suits over gadolinium poisoning . Krista states they will let the pt know and most
likely will not do the test.



Electronically signed by Júnior Salazar LPN at 2019  4:08 PM CDT

* Telephone Encounter - Mariam Nguyen MD - 2019  7:52 AM CDT



To get full eval with MRI should consider.

If she is hesitant, dc test



Electronically signed by Mariam Nguyen MD at 2019  7:52 AM CDT

* Telephone Encounter - Jaida Myers, RN - 2019 10:29 AM CDT



Returned call to pt's daughter after receiving voicemail that states patient manjeet
ld not like to receive the gadolinium dye during the Cardiac MRI. She is concern
ed about to possible side effects of it getting to her brain, and states she alr
giovanny is having trouble with her brain and prefers to not receive the dye. Explai
smith the purpose of the dye within the Cardiac MRI, and that in order to evaluate
the heart fully including the perfusion, the dye is necessary, and unsure how h
elpful the MRI would be without the dye component. She would like this to be run
by Dr. Nguyen to see if able to gain necessary information without the use of 
the dye. Routed to Dr. Nguyen.



Electronically signed by Jaida Myers RN at 2019 10:36 AM CDT

documented in this encounter



Plan of Treatment





Not on filedocumented as of this encounter



Visit Diagnoses

Not on filedocumented in this encounter

## 2020-03-13 NOTE — XMS REPORT
Encounter Summary

                             Created on: 2020



Alvina Charles

External Reference #: [id number]

: 1935

Sex: Female



Demographics





                          Address                   [address] [place]  [address]

 

                          Home Phone                [phone]

 

                          Preferred Language        English

 

                          Marital Status            

 

                          Lutheran Affiliation     [id number]

 

                          Race                      White

 

                          Ethnic Group              Not  or 





Author





                          Author                    [facility]

 

                          Organization              [facility]

 

                          Address                   Unknown

 

                          Phone                     Unavailable







Support





                Name            Relationship    Address         Phone

 

                Krista Honeycutt   [employer]            Unknown         [phone]







Care Team Providers





                    Care Team Member Name Role                Phone

 

                    Payam Hutchison   PCP                 [phone]







Encounter Details





                          Care Team                 Description



                     Date                Type                Department  

 

                                        



Mariam Nguyen MD



[address]



[place] 



[place] [address]



[phone] (Fax)                       



                     2018          Documentation       [facility]  



                                         [facility]  



                                         [facility]  



                                         [address]  



                                         [address]   



                                         [place] [address]  



                                         [phone]  







Social History





                                        Date



                 Tobacco Use     Types           Packs/Day       Years Used 

 

                                         



                                         Never Smoker    

 

    



                                         Smokeless Tobacco: Never   



                                         Used   







                    Drinks/Week         oz/Week             Comments



                                         Alcohol Use   

 

                                                             



                                         No   







 



                           Sex Assigned at Birth     Date Recorded

 

 



                                         Not on file 







                                        Industry



                           Job Start Date            Occupation 

 

                                        Not on file



                           Not on file               Not on file 







                                        Travel End



                           Travel History            Travel Start 

 





                                         No recent travel history available.



documented as of this encounter



Plan of Treatment





Not on filedocumented as of this encounter



Visit Diagnoses

Not on filedocumented in this encounter

## 2020-03-13 NOTE — XMS REPORT
Encounter Summary

                             Created on: 2020



Alvina Charles

External Reference #: [id number]

: 1935

Sex: Female



Demographics





                          Address                   [address] [place]  [address]

 

                          Home Phone                [phone]

 

                          Preferred Language        English

 

                          Marital Status            

 

                          Synagogue Affiliation     1013

 

                          Race                      White

 

                          Ethnic Group              Not  or 





Author





                          Author                    [facility]

 

                          Organization              [facility]

 

                          Address                   Unknown

 

                          Phone                     Unavailable







Support





                Name            Relationship    Address         Phone

 

                Krista Honeycutt   [employer]            Unknown         [phone]







Care Team Providers





                    Care Team Member Name Role                Phone

 

                    Payam Hutchison   PCP                 [phone]







Reason for Referral

* Electrophysiology (Routine)



                          Referred By Contact       Referred To Contact



                 Status          Reason          Specialty       Diagnoses /  



                                         Procedures  

 

                                        



[facility]



[address] [place] [address]



[address]



[place] [address]



Phone: [phone]                     







                           Closed                    Procedures  



                                         EKG Outside Record  







* Cardiac Cath (Routine)



                          Referred By Contact       Referred To Contact



                 Status          Reason          Specialty       Diagnoses /  



                                         Procedures  

 

                                        



[facility]



[address] [place] [address]



[address]



[place] [address]



Phone: [phone]                     







                           Closed                    Procedures  



                                         Coronary  



                                         Angiography  











Encounter Details





                          Care Team                 Description



                     Date                Type                Department  

 

                                        



Allyson Ornelas MD



no forwarding address                    



                     2016          Documentation       [facility]  



                                         [address] [place] [address]  



                                         [address]  



                                         [place], [place] [address] [phone]  







Social History





                                        Date



                 Tobacco Use     Types           Packs/Day       Years Used 

 

                                         



                                         Never Smoker    

 

    



                                         Smokeless Tobacco: Never   



                                         Used   







                    Drinks/Week         oz/Week             Comments



                                         Alcohol Use   

 

                                                             



                                         No   







 



                           Sex Assigned at Birth     Date Recorded

 

 



                                         Not on file 







                                        Industry



                           Job Start Date            Occupation 

 

                                        Not on file



                           Not on file               Not on file 







                                        Travel End



                           Travel History            Travel Start 

 





                                         No recent travel history available.



documented as of this encounter



Plan of Treatment





Not on filedocumented as of this encounter



Procedures





                                        Comments



                 Procedure Name  Priority        Date/Time       Associated Diag

nosis 

 

                                         



                     EKG OUTSIDE RECORD  Routine             2016  

 

                                         



                     LAB OUTSIDE RECORD  Routine             2016  

 

                                         



                     XR OUTSIDE RECORD   Routine             2016  

 

                                         



                     CORONARY ANGIOGRAPHY  Routine             2016  



documented in this encounter



Results

* Lab Outside Record (2016)







                                         Specimen

 





                                         Blood







 



                           Narrative                 Performed At

 

 



                                         This result has an attachment that is n

ot available. 





* XR Outside Record (2016)







                                         Specimen

 









 



                           Narrative                 Performed At

 

 



                                         This result has an attachment that is n

ot available. 





documented in this encounter



Visit Diagnoses

Not on filedocumented in this encounter

## 2020-03-13 NOTE — XMS REPORT
Clinical Summary

                             Created on: 2020



Alvina Charles

External Reference #: [id number]

: 1935

Sex: Female



Demographics





                          Address                   [address] [place]  [address]

 

                          Home Phone                [phone]

 

                          Preferred Language        English

 

                          Marital Status            

 

                          Adventism Affiliation     [id number]

 

                          Race                      White

 

                          Ethnic Group              Not  or 





Author





                          Author                    [facility]

 

                          Organization              [facility]

 

                          Address                   Unknown

 

                          Phone                     Unavailable







Support





                Name            Relationship    Address         Phone

 

                Krista Honeycutt   [employer]            Unknown         [phone]







Care Team Providers





                    Care Team Member Name Role                Phone

 

                    Payam Hutchison   PCP                 [phone]







Allergies





                                        Comments



                 Active Allergy  Reactions       Severity        Noted Date 

 

                                        



All MIRANDA--lidocaine, novacaine.............



                 Edgard-1         Anaphylaxis     High            2017 

 

                                         



                 Furosemide      Anaphylaxis     High            2016 

 

                                         



                           Naltrexone Analogues      2016 







Medications





                          End Date                  Status



              Medication   Sig          Dispensed    Refills      Start  



                                         Date  

 

                                                    Active



                     thyroid, pork, (ARMOUR)  Take 90 mg by       0   



                           60 mg Tab tablet          mouth daily.     







Active Problems





 



                           Problem                   Noted Date

 

 



                           Mitral regurgitation      2017

 

 



                           Pulmonary hypertension    2015

 

 



                                         Overview:



                                         Moderate per Echo

 

 



                                         Hypothyroidism 

 

 



                                         Fatigue 

 

 



                                         Exertional dyspnea 

 

 



                                         Multiple sclerosis 

 

 



                                         Family history of coronary artery disea

se 

 

 



                                         Chronic diastolic congestive heart fail

ure 







Resolved Problems





  



                     Problem             Noted Date          Resolved Date

 

  



                     Mitral valve prolapse  2017







Family History





   



                 Medical History  Relation        Name            Comments

 

   



                           Kidney cancer             Brother  

 

   



                           Melanoma                  Brother  

 

   



                           Heart attack              Father  







   



                 Relation        Name            Status          Comments

 

   



                           Brother                    



                                         (Age 5) 

 

   



                           Brother                   Alive 

 

   



                           Father                     



                                         (Age 88) 

 

   



                           Mother                     



                                         (Age 83) 







Social History





                                        Date



                 Tobacco Use     Types           Packs/Day       Years Used 

 

                                         



                                         Never Smoker    

 

    



                                         Smokeless Tobacco: Never   



                                         Used   







                    Drinks/Week         oz/Week             Comments



                                         Alcohol Use   

 

                                                             



                                         No   







 



                           Sex Assigned at Birth     Date Recorded

 

 



                                         Not on file 







                                        Industry



                           Job Start Date            Occupation 

 

                                        Not on file



                           Not on file               Not on file 







                                        Travel End



                           Travel History            Travel Start 

 





                                         No recent travel history available.







Last Filed Vital Signs





                    Reading             Time Taken          Comments



                                         Vital Sign   

 

                    113/59              2019  1:41 PM CDT lowest



                                         Blood Pressure   

 

                    80                  2019  1:40 PM CDT  



                                         Pulse   

 

                    -                   -                    



                                         Temperature   

 

                    -                   -                    



                                         Respiratory Rate   

 

                    -                   -                    



                                         Oxygen Saturation   

 

                    -                   -                    



                                         Inhaled Oxygen   



                                         Concentration   

 

                    61.7 kg (136 lb)    2019  1:40 PM CDT  



                                         Weight   

 

                    157.5 cm (5' 2")    2019  1:40 PM CDT  



                                         Height   

 

                    24.87               2019  1:40 PM CDT  



                                         Body Mass Index   







Plan of Treatment





   



                 Health Maintenance  Due Date        Last Done       Comments

 

   



                           Medicare Annual Wellness  1935  

 

   



                           Td #                      1935  

 

   



                           Zoster Vaccine# (1 of 2)  1986  

 

   



                           Depression Screening      2001  



                                         PHQ-9 #   

 

   



                           Fall Risk Assessment #    2001  

 

   



                           Osteoporosis Screening    2001  

 

   



                           Pneumococcal Vaccine: 65+  2001  



                                         Years (1 of 2 - PCV13)   

 

   



                           Influenza Vaccine (Season  2020  



                                         Ended)   







Results

Not on filefrom Last 3 Months



Insurance





                                        Type



            Payer      Benefit    Subscriber ID  Effective  Phone      Address 



                           Plan /                    Dates   



                                         Group     

 

                                        Medicare



              MEDICARE     MEDICARE     xxxxxxxxxx   Effective    [place] 



                     PART A B            for [place], [place] 



                                         dates   

 

                                         



                 COMMERCIAL-NONCONTRACTED  MISC            xxxxxxxxx       2016-   



                           COMMERCIAL                Present   



                                         NONCONTRAC     



                                         [place]     

 

                                         



                 MEDICARE REPLACEMENT PLAN  HOME CARE       xxxxxxxxx       -   



                           MDCR                      Present   



                                         REPLACEMEN     



                                         T M[employer] PPS     







     



            Guarantor Name  Account    Relation to  Date of    Phone      Arya thomas Address



                     Type                Patient             Birth  

 

     



            Alvina Charles  Personal/F  Self       1935  [phone]  [address]

[address] [place]               (Home)              [place] [address]

 

     



            Alvina Charles  Personal/F  Self       1935  [phone]  [address]

[address] [place]               (Home)              [place] [address]

 

     



            Alvina Charles  Personal/F  Self       1935  [phone]  [address]

[address] [place]               (Home)              [place] [address]







Advance Directives





For more information, please contact: [phone]







                          Patient Representative    Explanation



                           Type                      Date Recorded  

 

                                                     



                                         Advance Directives   



                                         and Living Will   

 

                                                     



                                         Power of    

 

                                                     



                                         Health Care   



                                         Directive

## 2020-03-13 NOTE — XMS REPORT
Encounter Summary

                             Created on: 2020



Alvina Charles

External Reference #: [id number]

: 1935

Sex: Female



Demographics





                          Address                   [address] [place]  [address]

 

                          Home Phone                [phone]

 

                          Preferred Language        English

 

                          Marital Status            

 

                          Caodaism Affiliation     1013

 

                          Race                      White

 

                          Ethnic Group              Not  or 





Author





                          Author                    [facility]

 

                          Organization              [facility]

 

                          Address                   Unknown

 

                          Phone                     Unavailable







Support





                Name            Relationship    Address         Phone

 

                Krista Honeycutt   ECON            Unknown         [phone]







Care Team Providers





                    Care Team Member Name Role                Phone

 

                    Payam Hutchison   PCP                 [phone]







Reason for Referral

* MRI/CAT/PET Scan (Routine)



                          Referred By Contact       Referred To Contact



                 Status          Reason          Specialty       Diagnoses /  



                                         Procedures  

 

                                        



Mariam Nguyen MD



[address] [facility] 



[place] [address]



Phone: [phone]



Fax: [phone]                       



[facility]



[address] [place] [address]



Phone: [phone]



Fax: [phone]



                     Canceled            Radiology           Diagnoses  



                                         Mitral valve  



                                         insufficiency,  



                                         unspecified  



                                         etiology  



                                         Exertional dyspnea

  



                                         P  



                                         rocedures  



                                         CV MRI Cardiac w  



                                         wo contrast  











Reason for Visit

* 



 



                           Reason                    Comments

 

 



                                         Follow-up 

 

 



                                         Congestive Heart Failure 









Encounter Details





                          Care Team                 Description



                     Date                Type                Department  

 

                                        



Mariam Nguyen MD



[address] [facility] 



[place] [address]



[phone] (Fax)                      Mitral valve insufficiency, unspecified 

etiology (Primary 

Dx); 

Exertional dyspnea



                     2018          Office Visit        [facility]  



                                         [facility]  



                                         [facility]  



                                         [address] [facility]  



                                         [address]   



                                         [place] [address]  



                                         [phone]  







Social History





                                        Date



                 Tobacco Use     Types           Packs/Day       Years Used 

 

                                         



                                         Never Smoker    

 

    



                                         Smokeless Tobacco: Never   



                                         Used   







                    Drinks/Week         oz/Week             Comments



                                         Alcohol Use   

 

                                                             



                                         No   







 



                           Sex Assigned at Birth     Date Recorded

 

 



                                         Not on file 







                                        Industry



                           Job Start Date            Occupation 

 

                                        Not on file



                           Not on file               Not on file 







                                        Travel End



                           Travel History            Travel Start 

 





                                         No recent travel history available.



documented as of this encounter



Last Filed Vital Signs





                    Reading             Time Taken          Comments



                                         Vital Sign   

 

                    137/71              2018 10:00 AM CDT  



                                         Blood Pressure   

 

                    70                  2018 10:00 AM CDT reg



                                         Pulse   

 

                    -                   -                    



                                         Temperature   

 

                    -                   -                    



                                         Respiratory Rate   

 

                    -                   -                    



                                         Oxygen Saturation   

 

                    -                   -                    



                                         Inhaled Oxygen   



                                         Concentration   

 

                    62.1 kg (137 lb)    2018 10:00 AM CDT  



                                         Weight   

 

                    157.5 cm (5' 2")    2018 10:00 AM CDT  



                                         Height   

 

                    25.06               2018 10:00 AM CDT  



                                         Body Mass Index   



documented in this encounter



Patient Instructions

* Patient Instructions* 



 Cydney Mullins RN - 2018 10:20 AM CDT



Your doctor has ordered the following test(s): MRI to be scheduled at your earli
est convenience.   A nurse will contact you with the results and your doctor's r
ecommendations approximately 7-10 business days after the test has been complete
d. 



Medication Instructions: Metoprolol Tartrate 12.5 mg as needed for bothersome ec
topy or palpitations. 



Follow up with MD Dr. Mariam Nguyen based on test results. 

 

Please call the Nurse Line at [phone] ([facility] [employer] [facility]). with any questions
or concerns. For medication refill needs, please first contact your pharmacy. 



For any [facility] scheduling questions, please andreia carbajal [phone]. 



At [facility], high quality patient care is our top priority.  To ensure our
cardiovascular standards are continuously met, you may receive a survey via ema
il or text message, and we ask that you please take the time to fill it out. We 
strive to ensure you are very satisfied with every visit.  Thank you in advance 
for taking the time to fill this out. 



It was a pleasure to meet you.  







Electronically signed by Cydney Mullins RN at 2018 11:02 AM CDT





documented in this encounter



Progress Notes

* Mariam Nguyen MD - 2018 10:20 AM CDT



Formatting of this note might be different from the original.

[facility]



Appointment Date: 2018



Payam Hutchison DO

[address] [place] [address]



RE:  Alvina Charles

:   1935  

Visit provider:  Mariam Nguyen MD



Dear Payam Hutchison DO,



I had the pleasure of seeing Alvina Charles in the office today. She is a(n) 83 y.o. 
female and presents with the following chief complaint(s): Follow-up and Congest
sarah Heart Failure



HPI:  

Ms. Charles is an 83-year-old female whom we have followed with a history of arias
tolic dysfunction and history of mitral regurgitation.  She has been traditional
ly followed by my colleague, Dr. Sammie Arellano.  



Recently, Ms. Charles underwent cardiac evaluation in [place] for worsening fatigu
e and ectopy.  A Holter monitor documented sinus rhythm with occasional PACs and
PVCs.  A transthoracic echocardiogram documented normal systolic function and n
ormal chamber dimensions with moderate to severe mitral regurgitation present.



Today, she continues to report general fatigue.  She does note ectopy that is jaydon
thersome when she lies down.  She does have dyspnea on exertion.  The recommenda
tions at the time of her last visit was for a SIXTO to further evaluate her mitral
valve.  However, it was felt she has contraindications to SIXTO due to an epiglot
tis situation.



She denies dyspnea at rest, orthopnea, PND, lower extremity edema, syncope, liz
dication or stroke.  She does take Lasix on an occasional basis.



 

Patient Active Problem List 

Diagnosis SNOMED CT(R) 

 Hypothyroidism HYPOTHYROIDISM 

 Fatigue FATIGUE 

 Exertional dyspnea DYSPNEA ON EXERTION 

 Pulmonary hypertension (HCC) PULMONARY HYPERTENSION 

 Multiple sclerosis (HCC) MULTIPLE SCLEROSIS 

 Mitral regurgitation MITRAL VALVE REGURGITATION 

 Family history of coronary artery disease FAMILY HISTORY OF CORONARY ARTERIO
SCLEROSIS 

 Chronic diastolic congestive heart failure (HCC) CHRONIC DIASTOLIC HEART MYCHAL
LURE 



Past Medical History: 

Diagnosis Date 

 Chest pain  

 Chronic diastolic congestive heart failure (HCC)  

 Edema  

 Lower ext 

 Exertional dyspnea  

 Family history of coronary artery disease  

 Hypothyroidism  

 Mitral regurgitation  

 Mitral valve prolapse 3/13/2017 

 Multiple sclerosis (HCC)  

 Pulmonary hypertension (HCC) 2015 

 Moderate per Echo 



Past Surgical History: 

Procedure Laterality Date 

 APPENDECTOMY   

 CARDIAC CATHETERIZATION  2016 

 No angiographic evidence of coronary artery disease. ([facility]) 

 HYSTERECTOMY   

 RIGHT HEART CATH  2016 

 RA: 7. RV: 41/9. PA: 38/11/20. Wedge: 15 with V-waves to 25. LV: 143/13, Ascend
ing aortic pressure: 145/61. CO/CI (Sanna): 3.6/2.1. LVEF 55%. ([facility]) 

 TONSILLECTOMY   



Final Medications:

Current Outpatient Prescriptions 

Medication Sig Dispense Refill 

 thyroid, pork, (ARMOUR) 60 mg Tab tablet Take 90 mg by mouth daily.    

 metoprolol tartrate (LOPRESSOR) 25 MG tablet Take 0.5 tablets (12.5 mg total
) by mouth daily as needed. For bothersome ectopy or palpitations. 45 tablet 3 



No current facility-administered medications for this visit.  



Allergies 

Allergen Reactions 

 Edgard-1 Anaphylaxis 

  All MIRANDA--lidocaine, novacaine............. 

 Lasix [Furosemide] Anaphylaxis 

 Naltrexone Analogues  



Family History 

Problem Relation Age of Onset 

 Heart attack Father  

 Kidney cancer Brother  

 Melanoma Brother  



Social History:

Social History 

Substance Use Topics 

 Smoking status: Never Smoker 

 Smokeless tobacco: Never Used 

 Alcohol use No 



Review of Systems 

Constitution: Positive for malaise/fatigue. Negative for fever and night sweats.
 

HENT: Negative for nosebleeds.  

Cardiovascular: Positive for dyspnea on exertion and leg swelling. Negative for 
chest pain, claudication, cyanosis, irregular heartbeat, near-syncope, orthopnea
, palpitations, paroxysmal nocturnal dyspnea and syncope. 

Respiratory: Positive for shortness of breath. Negative for cough, hemoptysis, s
leep disturbances due to breathing, snoring, sputum production and wheezing.  

Endocrine: Positive for cold intolerance. Negative for polydipsia. 

Hematologic/Lymphatic: Does not bruise/bleed easily. 

Skin: Negative for rash. 

Musculoskeletal: Negative for joint pain and myalgias. 

Gastrointestinal: Negative for dysphagia, hematochezia, nausea and vomiting. 

Genitourinary: Negative for hematuria. 

Neurological: Positive for loss of balance. Negative for disturbances in coordin
ation, excessive daytime sleepiness, dizziness, focal weakness, headaches, light
-headedness, numbness and paresthesias. 

All other systems reviewed and are negative.



Vital Signs 

 18 1000 

BP: 137/71 

Pulse: 70 

Weight: 62.1 kg (137 lb) 

Height: 1.575 m (5' 2") 

 

BMI: Body mass index is 25.06 kg/m.



Physical Exam 

Constitutional: She is oriented to person, place, and time. She appears well-dev
eloped. 

Eyes: Pupils are equal, round, and reactive to light. 

Neck: No JVD present. 

Cardiovascular: Normal rate.  

Murmur heard.

Pulmonary/Chest: Effort normal and breath sounds normal. 

Abdominal: Soft. 

Musculoskeletal: She exhibits no edema. 

Neurological: She is alert and oriented to person, place, and time. 

Skin: Skin is warm. 

Psychiatric: She has a normal mood and affect. 

Vitals reviewed.



Cholesterol (no units) 

Date Value 

2016 158 



Triglycerides (mg/dL) 

Date Value 

2016 80 



LDL Cholesterol 

Date/Time Value Ref Range Status 

2016 102 mg/dL Final 



Encounter Diagnoses 

Name Primary? 

 Mitral valve insufficiency, unspecified etiology Yes 

 Exertional dyspnea  



Impression:

1.  Constitutional symptoms of general fatigue, dyspnea on exertion and symptoma
tic ectopy.  On her exam today, she does have the expected systolic murmur with 
suspect PACs and PVCs on exam.  Her underlying rhythm was regular.

2.  Mitral regurgitation.  Her last assessment revealed normal systolic function
 with normal LV dimensions and moderate to severe mitral regurgitation with norm
al atrial dimensions.  Her pulmonary pressure was 45 mmHg.

3.  Diastolic dysfunction without need for daily diuretic therapy.  Her blood pr
essure is under good control.

4.  Remote history of type II MI with normal coronary arteries per angiogram.



Plan:

1.  I did review risk factors and updates on preventive strategies.

2.  I did concur with recommendations for consideration of a low-dose beta block
er.  She is quite hesitant about it impacting her blood pressure.  She is agreea
ble to a very low dose of metoprolol tartrate 25 mg with taking a one-half table
t more on a p.r.n. basis first for observation of associated improvement in symp
toms.

3.  In that a SIXTO is felt to be contraindicated at this time, I have suggested a
 cardiac MRI, which she is interested in pursuing.  I will review these results 
with her with further recommendations.



Treatment goals, progress and next steps, as above, were discussed and mutually 
agreed upon with the patient/family.  



Thank you for allowing me to participate in Alvina Charles's care.  If I can be of an
y further assistance, please do not hesitate to contact me.



Sincerely,

Mariam Nguyen MD

/cms



Electronically signed by Mariam Nguyen MD at 2018  9:56 AM CDT

documented in this encounter



Plan of Treatment





                                        Order Schedule



                 Name            Type            Priority        Associated Diag

noses 

 

                                        1 Occurrences starting 2018 until 

2019



                 CV MRI Cardiac w wo  Cardiac         Routine         Mitral catarina

ve 



                     contrast            Services            insufficiency, 



                                         unspecified etiology 



                                         Exertional dyspnea 



documented as of this encounter



Visit Diagnoses









                                         Diagnosis

 





                                         Mitral valve insufficiency, unspecified

 etiology

 





                                         Exertional dyspnea



                                         Other dyspnea and respiratory abnormali

ty



documented in this encounter

## 2020-03-13 NOTE — XMS REPORT
Encounter Summary

                             Created on: 2020



IraidabipinAlvina snellTuyet

External Reference #: [id number]

: 1935

Sex: Female



Demographics





                          Address                   [address] [place]  [address]

 

                          Home Phone                [phone]

 

                          Preferred Language        English

 

                          Marital Status            

 

                          Orthodox Affiliation     1013

 

                          Race                      White

 

                          Ethnic Group              Not  or 





Author





                          Author                    [facility]

 

                          Organization              [facility]

 

                          Address                   Unknown

 

                          Phone                     Unavailable







Support





                Name            Relationship    Address         Phone

 

                Krista Hoenycutt   ECON            Unknown         [phone]







Care Team Providers





                    Care Team Member Name Role                Phone

 

                    Payam Hutchison   PCP                 [phone]







Reason for Visit

* 



 



                           Reason                    Comments

 

 



                                         Appointment 









Encounter Details





                          Care Team                 Description



                     Date                Type                Department  

 

                                        



El Nelson, SALLY                         Appointment



                     2019          Telephone           [facility]  



                                         [facility]  



                                         [address]  



                                         [address]  



                                         [place] [address]  



                                         [phone]  







Social History





                                        Date



                 Tobacco Use     Types           Packs/Day       Years Used 

 

                                         



                                         Never Smoker    

 

    



                                         Smokeless Tobacco: Never   



                                         Used   







                    Drinks/Week         oz/Week             Comments



                                         Alcohol Use   

 

                                                             



                                         No   







 



                           Sex Assigned at Birth     Date Recorded

 

 



                                         Not on file 







                                        Industry



                           Job Start Date            Occupation 

 

                                        Not on file



                           Not on file               Not on file 







                                        Travel End



                           Travel History            Travel Start 

 





                                         No recent travel history available.



documented as of this encounter



Miscellaneous Notes

* Telephone Encounter - Luisana Rob - 2019  3:22 PM CDT



Patient is already scheduled with [facility] . 



Electronically signed by Luisana Rob at 2019  3:22 PM CDT

* Telephone Encounter - Aleja Lei - 2019  5:07 PM CDT



Called and spoke to pt and notified her of rec to see Dr. Nguyen prior to rahul thomas the decision about needing an MRI. She v/u and will await a call from [facility]. Routing to scheduling. 



Electronically signed by Aleja Lei at 2019  5:08 PM CDT

* Telephone Encounter - Mariam Nguyen MD - 2019  3:22 PM CDT



Favor revisit with OV



Electronically signed by Mariam Nguyen MD at 2019  3:22 PM CDT

* Telephone Encounter - El Nelson RN - 2019  2:43 PM CDT



Pt's daughter called. She wants Pt to start seeing Dr Nguyen again. Pt had a pr
evious order of a CV MRI, then cancelled it. Pt's daughter wants to ask Dr Raj negron if we need to re-order that, and anything else, before her Mother should come
in for a Visit.



Electronically signed by El Nelson RN at 2019  2:46 PM CDT

documented in this encounter



Plan of Treatment





Not on filedocumented as of this encounter



Visit Diagnoses

Not on filedocumented in this encounter

## 2020-03-13 NOTE — XMS REPORT
Encounter Summary

                             Created on: 2020



Alvina Charles

External Reference #: [id number]

: 1935

Sex: Female



Demographics





                          Address                   [address] [place]  [address]

 

                          Home Phone                [phone]

 

                          Preferred Language        English

 

                          Marital Status            

 

                          Denominational Affiliation     1013

 

                          Race                      White

 

                          Ethnic Group              Not  or 





Author





                          Author                    [facility]

 

                          Organization              [facility]

 

                          Address                   Unknown

 

                          Phone                     Unavailable







Support





                Name            Relationship    Address         Phone

 

                Krista Honeycutt   EC[employer]            Unknown         [phone]







Care Team Providers





                    Care Team Member Name Role                Phone

 

                    Payam Hutchison   PCP                 [phone]







Encounter Details





                          Care Team                 Description



                     Date                Type                Department  

 

                                        



Jasmin Montiel RN                      



                     2018          Abstract            [facility]  



                                         [facility]  



                                         [facility]  



                                         [address]  



                                         [address]  



                                         [place] [address]  



                                         [phone]  







Social History





                                        Date



                 Tobacco Use     Types           Packs/Day       Years Used 

 

                                         



                                         Never Smoker    

 

    



                                         Smokeless Tobacco: Never   



                                         Used   







                    Drinks/Week         oz/Week             Comments



                                         Alcohol Use   

 

                                                             



                                         No   







 



                           Sex Assigned at Birth     Date Recorded

 

 



                                         Not on file 







                                        Industry



                           Job Start Date            Occupation 

 

                                        Not on file



                           Not on file               Not on file 







                                        Travel End



                           Travel History            Travel Start 

 





                                         No recent travel history available.



documented as of this encounter



Plan of Treatment





Not on filedocumented as of this encounter



Procedures





                                        Comments



                 Procedure Name  Priority        Date/Time       Associated Diag

nosis 

 

                                        



Results for this procedure are in the results section.



                     HOLTER OUTSIDE RECORD  Routine             08/10/2018  

 

                                        



Results for this procedure are in the results section.



                     ECHO OUTSIDE RECORD  Routine             08/10/2018  



documented in this encounter



Results

* Holter Outside Record (08/10/2018)



 



                           Impressions               Performed At

 

 



                                         [facility] ([facility]) 



                                         IMPRESSION:  



                                         1. Sinus rhythm with brief periods of m

ild sinus bradycardia and periods of 



                                         sinus tachycardia. 



                                         2. Isolated PVCs. 



                                         3. Occasional PACs with 3 very brief (3

- to 5-beat) runs of supraventricular 



                                         tachycardia. 



                                         4. No pauses. 





* Echo Outside Record (08/10/2018)



    



              Component    Value        Ref Range    Performed At  Pathologist



                                         Signature

 

    



                                         Ejection    



                                         Fraction    







 



                           Impressions               Performed At

 

 



                                         [facility] ([facility]) 



                                         Result Impression: 



                                         1. Normal left ventricular size and sys

tolic function. 



                                         2. Moderate to severe mitral regurgitat

ion with surprisingly normal left 



                                         atrial size. 



                                         3. Mild to moderate tricuspid regurgita

tion with calculated RV systolic 



                                         pressure consistent with moderate pulmo

nary hypertension. There is 



                                         associated right-sided chamber enlargem

ent. 



                                         4. Mild pulmonic insufficiency. 



                                         5. Aortic valve sclerosis with no signi

ficant aortic stenosis. 





documented in this encounter



Visit Diagnoses









                                         Diagnosis

 





                                         Family history of coronary artery disea

se



                                         Family history of ischemic heart diseas

e

 





                                         Chronic diastolic congestive heart fail

ure (HCC)



documented in this encounter

## 2020-03-13 NOTE — XMS REPORT
Encounter Summary

                             Created on: 2020



Alvina Charles

External Reference #: [id number]

: 1935

Sex: Female



Demographics





                          Address                   [address] [place]  [address]

 

                          Home Phone                [phone]

 

                          Preferred Language        English

 

                          Marital Status            

 

                          Episcopal Affiliation     1013

 

                          Race                      White

 

                          Ethnic Group              Not  or 





Author





                          Author                    [facility]

 

                          Organization              [facility]

 

                          Address                   Unknown

 

                          Phone                     Unavailable







Support





                Name            Relationship    Address         Phone

 

                Krista Honeycutt   [employer]            Unknown         [phone]







Care Team Providers





                    Care Team Member Name Role                Phone

 

                    Payam Hutchison   PCP                 [phone]







Reason for Visit

* 



 



                           Reason                    Comments

 

 



                                         SIXTO cancellation 









Encounter Details





                          Care Team                 Description



                     Date                Type                Department  

 

                                        



Fransico Senior RN                      SIXTO cancellation



                     2017          Telephone           [facility]  



                                         [address]  



                                         [address]  



                                         [place] [address]  



                                         [phone]  







Social History





                                        Date



                 Tobacco Use     Types           Packs/Day       Years Used 

 

                                         



                                         Never Smoker    

 

    



                                         Smokeless Tobacco: Never   



                                         Used   







                    Drinks/Week         oz/Week             Comments



                                         Alcohol Use   

 

                                                             



                                         No   







 



                           Sex Assigned at Birth     Date Recorded

 

 



                                         Not on file 







                                        Industry



                           Job Start Date            Occupation 

 

                                        Not on file



                           Not on file               Not on file 







                                        Travel End



                           Travel History            Travel Start 

 





                                         No recent travel history available.



documented as of this encounter



Miscellaneous Notes

* Addendum Note - Fransico Senior RN - 2017  5:50 PM CDT



 Addended by: FRANSICO SENIOR on: 3/13/2017 05:50 PM



  Modules accepted: Orders, SmartSet



  



Electronically signed by Fransico Senior RN at 2017  5:50 PM CDT

* Telephone Encounter - Fransico Senior RN - 2017  5:27 PM CDT



Made call to patient with [facility] rec.  Asked patient to call office and let us know
when her echo would be performed as [facility] would like to review it prior to her fo
llow up appointment in April.  Also discussed pt symptoms and if worsened should
proceed to ER for evaluation.  Pt. V/u.  Made call to [facility] and 
spoke with Order entered for echo and faxed to [facility] echo department at 
[phone] with request to send CD of echo to [facility] for viewing.



Electronically signed by Fransico Senior RN at 2017  5:49 PM CDT

* Telephone Encounter - Sammie Arellano MD - 2017  5:22 PM CDT



Doesn't sound like lasix helped.  Let's wait to see what TTE shows, ok to have a
t [facility] as long as I can get films to review images personally prior to office v
isit to review.



Electronically signed by Sammie Arellano MD at 2017  5:22 PM CDT

* Telephone Encounter - Fransico Senior RN - 2017  4:57 PM CDT



Received following email from [facility]:



Lets change to TTE for mitral valve prolapsed and mitral regurgitation, HF.

Is she still taking diuretic?  If so we are due for repeat labs.



Made call to patient.  She states she has only taken the 10mg lasix 2 days - pro
bably 3/8 and 3/10.  She states the  is the last day she took the med.  She 
states she cannot take it as it drops her diastolic to as low as 32 and she feel
s bad.  She states she feels as though she is a car running out of gas and she f
eels very fatigued after taking it.  Spoke with her in regards to how she has be
en feeling and she reports that she has slight swelling in her feet and belly.  
She wonders if her belly swelling is from her liver cysts as she has R abdominal
pain under her ribcage and that she drinks lemon juice and feels better.  Discu
ssed that lemon is a natural diuretic.  She also reports SOB with exertion.  She
states the evenings of 3/9, 3/10 and 3/11 she had rough nights of palpitations 
and chest pain that she describes as toothe ache pain that subsided.  Discussed 
that if she starts this again or it worsens she should proceed to the ER.  Also 
discussed that [facility] is ordering an echo, pt wants to know if she can have it done
in [place] at [facility].  Will route to [facility] for recs.  Will await ordering echo until
hear from [facility] on if can have done at [facility].



Electronically signed by Fransico Senior RN at 2017  5:08 PM CDT

* Telephone Encounter - Fransico Senior RN - 2017  4:22 PM CDT



Received VM from patient requesting to cancel SIXTO.  Returned call to patient who
reports she thinks it is too risky to have SIXTO as she has an anaphylactic react
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 
needs it repaired.  Asked her if she has dysphagia or difficulty swallowing.  Sh
e reports she has to hold her head a certain way or things go down the wrong tub
e and she has coughing fits.  Told her that dysphagia is contraindicated with TE
E and this will be passed along to [facility].  The following email was sent to [facility] and
schedulers:



Hey everyone! FYI ---This patient is cancelling her SIXTO at this time.  She has a
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
told she needs to have it repaired.  She also thinks it is too risky due to her 
severe allergy to valentina.  



Dr. Arellano-

      Due to her concern and history of dysphagia her SIXTO is cancelled. Are you 
wanting to change to an echo to look at mitral valve?  Patient very appreciative
of our office and your care during her appointment.  Let me know what I can do! 
She also wanted you to know that she was found to have cysts to her liver and is
seeing a GI MD on Wednesday.







Electronically signed by Fransico Senior RN at 2017  4:24 PM CDT

documented in this encounter



Plan of Treatment





Not on filedocumented as of this encounter



Visit Diagnoses









                                         Diagnosis

 





                                         Mitral valve insufficiency, unspecified

 etiology

 





                                         Exertional dyspnea



                                         Other dyspnea and respiratory abnormali

ty

 





                                         Mitral valve prolapse



                                         Mitral valve disorders



documented in this encounter

## 2020-03-13 NOTE — XMS REPORT
Encounter Summary

                             Created on: 2020



Alvina Charles

External Reference #: [id number]

: 1935

Sex: Female



Demographics





                          Address                   [address] [place]  [address]

 

                          Home Phone                [phone]

 

                          Preferred Language        English

 

                          Marital Status            

 

                          Zoroastrian Affiliation     [id number]

 

                          Race                      White

 

                          Ethnic Group              Not  or 





Author





                          Author                    [facility]

 

                          Organization              [facility]

 

                          Address                   Unknown

 

                          Phone                     Unavailable







Support





                Name            Relationship    Address         Phone

 

                Krista Honeycutt   [employer]            Unknown         [phone]







Care Team Providers





                    Care Team Member Name Role                Phone

 

                    Payam Hutchison   PCP                 [phone]







Encounter Details





                          Care Team                 Description



                     Date                Type                Department  

 

                                        



Mariam Nguyen MD



[address] 



[place] [address] [phone] (Fax)                       



                     12/15/2015          Documentation       [facility]  



                                         [facility]  



                                         [facility]  



                                         [address] [place] [address]  



                                         [address]  



                                         [place] [address] [phone]  







Social History





                                        Date



                 Tobacco Use     Types           Packs/Day       Years Used 

 

                                         



                                         Never Assessed    







 



                           Sex Assigned at Birth     Date Recorded

 

 



                                         Not on file 







                                        Industry



                           Job Start Date            Occupation 

 

                                        Not on file



                           Not on file               Not on file 







                                        Travel End



                           Travel History            Travel Start 

 





                                         No recent travel history available.



documented as of this encounter



Plan of Treatment





Not on filedocumented as of this encounter



Visit Diagnoses

Not on filedocumented in this encounter

## 2020-03-13 NOTE — XMS REPORT
Encounter Summary

                             Created on: 2020



Alvina Charles

External Reference #: [id number]

: 1935

Sex: Female



Demographics





                          Address                   [address] [place]  [address]

 

                          Home Phone                [phone]

 

                          Preferred Language        English

 

                          Marital Status            

 

                          Restorationism Affiliation     1013

 

                          Race                      White

 

                          Ethnic Group              Not  or 





Author





                          Author                    [facility]

 

                          Organization              [facility]

 

                          Address                   Unknown

 

                          Phone                     Unavailable







Support





                Name            Relationship    Address         Phone

 

                Krista Honeycutt   [employer]            Unknown         [phone]







Care Team Providers





                    Care Team Member Name Role                Phone

 

                    Payam Hutchison   PCP                 [phone]







Encounter Details





                          Care Team                 Description



                     Date                Type                Department  

 

                                        



Mariam Nguyen MD



[address]



[place] [address]



[phone] (Fax)                      Low TSH level; 

Hyperthyroidism



                     05/10/2019          Orders Only         [facility]  



                                         [facility]  



                                         [address]  



                                         [address]   



                                         [place] [address]  



                                         [phone]  







Social History





                                        Date



                 Tobacco Use     Types           Packs/Day       Years Used 

 

                                         



                                         Never Smoker    

 

    



                                         Smokeless Tobacco: Never   



                                         Used   







                    Drinks/Week         oz/Week             Comments



                                         Alcohol Use   

 

                                                             



                                         No   







 



                           Sex Assigned at Birth     Date Recorded

 

 



                                         Not on file 







                                        Industry



                           Job Start Date            Occupation 

 

                                        Not on file



                           Not on file               Not on file 







                                        Travel End



                           Travel History            Travel Start 

 





                                         No recent travel history available.



documented as of this encounter



Plan of Treatment





Not on filedocumented as of this encounter



Visit Diagnoses









                                         Diagnosis

 





                                         Low TSH level

 





                                         Hyperthyroidism



                                         Thyrotoxicosis without mention of goite

r or other cause, without mention of 

thyrotoxic crisis or



                                         storm



documented in this encounter

## 2020-03-13 NOTE — XMS REPORT
Encounter Summary

                             Created on: 2020



Alvina Charles

External Reference #: [id number]

: 1935

Sex: Female



Demographics





                          Address                   [address] [place]  [address]

 

                          Home Phone                [phone]

 

                          Preferred Language        English

 

                          Marital Status            

 

                          Buddhism Affiliation     [id number]

 

                          Race                      White

 

                          Ethnic Group              Not  or 





Author





                          Author                    [facility]

 

                          Organization              [facility]

 

                          Address                   Unknown

 

                          Phone                     Unavailable







Support





                Name            Relationship    Address         Phone

 

                Krista Honeycutt   EC[employer]            Unknown         [phone]







Care Team Providers





                    Care Team Member Name Role                Phone

 

                    Payam Hutchison   PCP                 [phone]







Reason for Visit

* 



 



                           Reason                    Comments

 

 



                                         SIXTO cancellation 









Encounter Details





                          Care Team                 Description



                     Date                Type                Department  

 

                                        



Fransico Senior RN                      SIXTO cancellation



                     2017          Telephone           [facility]  



                                         [facility]  



                                         [facility]  



                                         [address]  



                                         [address]  



                                         [place] [address]  



                                         [phone]  







Social History





                                        Date



                 Tobacco Use     Types           Packs/Day       Years Used 

 

                                         



                                         Never Smoker    

 

    



                                         Smokeless Tobacco: Never   



                                         Used   







                    Drinks/Week         oz/Week             Comments



                                         Alcohol Use   

 

                                                             



                                         No   







 



                           Sex Assigned at Birth     Date Recorded

 

 



                                         Not on file 







                                        Industry



                           Job Start Date            Occupation 

 

                                        Not on file



                           Not on file               Not on file 







                                        Travel End



                           Travel History            Travel Start 

 





                                         No recent travel history available.



documented as of this encounter



Miscellaneous Notes

* Addendum Note - Fransico Senior RN - 2017  5:50 PM CDT



 Addended by: FRANSICO SENIOR on: 3/13/2017 05:50 PM



  Modules accepted: Orders, SmartSet



  



Electronically signed by Fransico Senior RN at 2017  5:50 PM CDT

* Telephone Encounter - Fransico Senior RN - 2017  5:27 PM CDT



Made call to patient with [facility] rec.  Asked patient to call office and let us know
when her echo would be performed as [facility] would like to review it prior to her fo
llow up appointment in April.  Also discussed pt symptoms and if worsened should
proceed to ER for evaluation.  Pt. V/u.  Made call to [facility] and 
spoke with Order entered for echo and faxed to [facility] echo department at 
[phone] with request to send CD of echo to [facility] for viewing.



Electronically signed by Fransico Senior RN at 2017  5:49 PM CDT

* Telephone Encounter - Sammie Arellano MD - 2017  5:22 PM CDT



Doesn't sound like lasix helped.  Let's wait to see what TTE shows, ok to have a
t [facility] as long as I can get films to review images personally prior to office v
isit to review.



Electronically signed by Sammie Arellano MD at 2017  5:22 PM CDT

* Telephone Encounter - Fransico Senior RN - 2017  4:57 PM CDT



Received following email from [facility]:



Lets change to TTE for mitral valve prolapsed and mitral regurgitation, HF.

Is she still taking diuretic?  If so we are due for repeat labs.



Made call to patient.  She states she has only taken the 10mg lasix 2 days - pro
bably 3/8 and 3/10.  She states the  is the last day she took the med.  She 
states she cannot take it as it drops her diastolic to as low as 32 and she feel
s bad.  She states she feels as though she is a car running out of gas and she f
eels very fatigued after taking it.  Spoke with her in regards to how she has be
en feeling and she reports that she has slight swelling in her feet and belly.  
She wonders if her belly swelling is from her liver cysts as she has R abdominal
pain under her ribcage and that she drinks lemon juice and feels better.  Discu
ssed that lemon is a natural diuretic.  She also reports SOB with exertion.  She
states the evenings of 3/9, 3/10 and 3/11 she had rough nights of palpitations 
and chest pain that she describes as toothe ache pain that subsided.  Discussed 
that if she starts this again or it worsens she should proceed to the ER.  Also 
discussed that [facility] is ordering an echo, pt wants to know if she can have it done
in [place] at [facility].  Will route to [facility] for recs.  Will await ordering echo until
hear from [facility] on if can have done at [facility].



Electronically signed by Fransico Senior RN at 2017  5:08 PM CDT

* Telephone Encounter - Fransico Senior RN - 2017  4:22 PM CDT



Received VM from patient requesting to cancel SIXTO.  Returned call to patient who
reports she thinks it is too risky to have SIXTO as she has an anaphylactic react
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 
needs it repaired.  Asked her if she has dysphagia or difficulty swallowing.  Sh
e reports she has to hold her head a certain way or things go down the wrong tub
e and she has coughing fits.  Told her that dysphagia is contraindicated with TE
E and this will be passed along to [facility].  The following email was sent to [facility] and
schedulers:



Hey everyone! FYI ---This patient is cancelling her SIXTO at this time.  She has a
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
told she needs to have it repaired.  She also thinks it is too risky due to her 
severe allergy to valentina.  



Dr. Arellano-

      Due to her concern and history of dysphagia her SIXTO is cancelled. Are you 
wanting to change to an echo to look at mitral valve?  Patient very appreciative
of our office and your care during her appointment.  Let me know what I can do! 
She also wanted you to know that she was found to have cysts to her liver and is
seeing a GI MD on Wednesday.







Electronically signed by Fransico Senior RN at 2017  4:24 PM CDT

documented in this encounter



Plan of Treatment





Not on filedocumented as of this encounter



Visit Diagnoses









                                         Diagnosis

 





                                         Mitral valve insufficiency, unspecified

 etiology

 





                                         Exertional dyspnea



                                         Other dyspnea and respiratory abnormali

ty

 





                                         Mitral valve prolapse



                                         Mitral valve disorders



documented in this encounter

## 2020-03-13 NOTE — XMS REPORT
Encounter Summary

                             Created on: 2020



Alvina Bansal

External Reference #: [id number]

: 1935

Sex: Female



Demographics





                          Address                   [address] [place]  [address]

 

                          Home Phone                [phone]

 

                          Preferred Language        English

 

                          Marital Status            

 

                          Orthodoxy Affiliation     [id number]

 

                          Race                      White

 

                          Ethnic Group              Not  or 





Author





                          Author                    [facility]

 

                          Organization              [facility]

 

                          Address                   Unknown

 

                          Phone                     Unavailable







Support





                Name            Relationship    Address         Phone

 

                Krista Honeycutt   EC[employer]            Unknown         [phone]







Care Team Providers





                    Care Team Member Name Role                Phone

 

                    Payam Hutchison   PCP                 [phone]







Reason for Referral

* MRI/CAT/PET Scan (Routine)



                          Referred By Contact       Referred To Contact



                 Status          Reason          Specialty       Diagnoses /  



                                         Procedures  

 

                                        



Mariam Nguyen MD



[address] [facility] 



[place] [address]



Phone: [phone]



Fax: [phone]                       



[facility]



[address] [place] [address]



Phone: [phone]



Fax: [phone]



                     Closed              Radiology           Diagnoses  



                                         Chronic diastolic  



                                         congestive heart  



                                         failure (HCC)  



                                         Mitral valve  



                                         insufficiency,  



                                         unspecified  



                                         etiology

  



                                         P  



                                         rocedures  



                                         CV MRI Cardiac w  



                                         wo contrast  







* Electrophysiology (Routine)



                          Referred By Contact       Referred To Contact



                 Status          Reason          Specialty       Diagnoses /  



                                         Procedures  

 

                                        



Mariam Nguyen MD



[address] [facility] 



[place] [address]



Phone: [phone]



Fax: [phone]                       







                           Closed                    Diagnoses  



                                         Bradycardia

  



                                         P  



                                         rocedures  



                                         Cardiac Monitoring  



                                         Patch  







* Diagnostic Imaging (Routine)



                          Referred By Contact       Referred To Contact



                 Status          Reason          Specialty       Diagnoses /  



                                         Procedures  

 

                                        



Mariam Nguyen MD



[address] [facility] 



[place] [address]



Phone: [phone]



Fax: [phone]                       







                           Closed                    Diagnoses  



                                         Chronic diastolic  



                                         congestive heart  



                                         failure (HCC)  



                                         Mitral valve  



                                         insufficiency,  



                                         unspecified  



                                         etiology

  



                                         P  



                                         rocedures  



                                         Electrocardiogram  



                                         (ECG)  











Reason for Visit

* 



 



                           Reason                    Comments

 

 



                                         Congestive Heart Failure 









Encounter Details





                          Care Team                 Description



                     Date                Type                Department  

 

                                        



Mariam Nguyen MD



[address] [facility] 



[place] [address]



[phone] (Fax)                      Chronic diastolic congestive heart failu

re (HCC) (Primary 

Dx); 

Mitral valve insufficiency, unspecified etiology; 

Bradycardia; 

Laboratory exam ordered as part of routine general medical examination; 

Exertional dyspnea; 

Pulmonary hypertension (HCC); 

Fatigue, unspecified type



                     2019          Office Visit        [facility]  



                                         [facility]  



                                         [address]  



                                         [address]  



                                         [place] [address]  



                                         [phone]  







Social History





                                        Date



                 Tobacco Use     Types           Packs/Day       Years Used 

 

                                         



                                         Never Smoker    

 

    



                                         Smokeless Tobacco: Never   



                                         Used   







                    Drinks/Week         oz/Week             Comments



                                         Alcohol Use   

 

                                                             



                                         No   







 



                           Sex Assigned at Birth     Date Recorded

 

 



                                         Not on file 







                                        Industry



                           Job Start Date            Occupation 

 

                                        Not on file



                           Not on file               Not on file 







                                        Travel End



                           Travel History            Travel Start 

 





                                         No recent travel history available.



documented as of this encounter



Last Filed Vital Signs





                    Reading             Time Taken          Comments



                                         Vital Sign   

 

                    113/59              2019  1:41 PM CDT lowest



                                         Blood Pressure   

 

                    80                  2019  1:40 PM CDT  



                                         Pulse   

 

                    -                   -                    



                                         Temperature   

 

                    -                   -                    



                                         Respiratory Rate   

 

                    -                   -                    



                                         Oxygen Saturation   

 

                    -                   -                    



                                         Inhaled Oxygen   



                                         Concentration   

 

                    61.7 kg (136 lb)    2019  1:40 PM CDT  



                                         Weight   

 

                    157.5 cm (5' 2")    2019  1:40 PM CDT  



                                         Height   

 

                    24.87               2019  1:40 PM CDT  



                                         Body Mass Index   



documented in this encounter



Patient Instructions

* Patient Instructions* 



 Aleja Lei - 2019  1:45 PM CDT



Your doctor has ordered the following test(s): CBC, CMP, lipid panel, TSH. These
are blood tests. Please have this done today in [facility], [address]



Zio patch. We will attempt to have this done today



CV MRI



We will attempt to assist you with scheduling your testing. Depending on your in
surance, a pre-authorization may be required to schedule your testing. If this i
s the case, you will receive a call to schedule after your insurance approval ha
s been received.



You will receive a call 5-7 days after testing has been completed to discuss the
results. 



Medication Instructions: No changes



Additional Instructions: None



Your doctor has placed a referral to: None 



Follow-up: Depending on results of testing

 

Please call the Nurse Line at [phone] with any questions or concerns. For
medication refill needs, please first contact your pharmacy. 



For any [facility] scheduling questions, please andreia carbajal [phone]. 



At [facility], high quality patient care is our top priority. To ensure our 
cardiovascular standards are continuously met, you may receive a survey via emai
l or text message, and we ask that you please take the time to fill it out. We s
trive to ensure you are very satisfied with every visit.  Thank you in advance f
or taking the time to fill this out. 



It was a pleasure to see you today



SALLY Masterson





Electronically signed by Aleja Lei at 2019  2:31 PM CDT





documented in this encounter



Progress Notes

* Mariam Nguyen MD - 2019  1:45 PM CDT



Formatting of this note might be different from the original.

[facility]



Appointment Date: 2019



Payam Hutchison DO

[address] [place] [id number]



RE:  Alvina Bansal

:   1935  

Visit provider:  Mariam Nguyen MD



Dear Payam Hutchison DO:



I had the pleasure of seeing Alvina Bansal in the office today. She is a(n) 83-year-
old female and presents with the following chief complaint(s):  Congestive heart
failure.



HPI:

Ms. Basnal is an 83-year-old female whom we have followed with a history of mitr
al regurgitation.  At the time of our last visit, severe mitral regurgitation vi
a echocardiogram was noted.  Recommendations were made for a SIXTO, though due to 
an epiglottis pathology, it was felt SIXTO was contraindicated.  Recommendations a
t that time were for a cardiac MRI to further evaluate her MR.  This was deferre
d by her at that time.



Today, we saw her with new symptoms of profound fatigue.  She feels like she is 
melting.  She does spot check her heart rate and states at times her monitor tel
ls her it is 20 beats per minute.  Other than feeling fatigued, she does not not
e presyncope or syncope.  Her only medication is of that related to her thyroid 
medicine.  Her weight has been stable.  She does have dyspnea on exertion.  She 
denies dyspnea at rest, orthopnea, PND, syncope, or symptoms of stroke.



Patient Active Problem List 

Diagnosis SNOMED CT(R) 

 Hypothyroidism HYPOTHYROIDISM 

 Fatigue FATIGUE 

 Exertional dyspnea DYSPNEA ON EXERTION 

 Pulmonary hypertension (HCC) PULMONARY HYPERTENSION 

 Multiple sclerosis (HCC) MULTIPLE SCLEROSIS 

 Mitral regurgitation MITRAL VALVE REGURGITATION 

 Family history of coronary artery disease FAMILY HISTORY OF CORONARY ARTERIO
SCLEROSIS 

 Chronic diastolic congestive heart failure (HCC) CHRONIC DIASTOLIC HEART MYCHAL
LURE 



Past Medical History: 

Diagnosis Date 

 Chest pain  

 Chronic diastolic congestive heart failure (HCC)  

 Edema  

 Lower ext 

 Exertional dyspnea  

 Family history of coronary artery disease  

 H. pylori ulcer  

 Hypothyroidism  

 Left bundle branch block (LBBB)  

 Mitral regurgitation  

 Mitral valve prolapse 3/13/2017 

 Multiple sclerosis (HCC)  

 Myocardial infarction (HCC)  

 Type 2 

 Nocturnal hypoxemia  

 Osteoporosis  

 Positive JACOB (antinuclear antibody)  

 Pulmonary hypertension (HCC) 2015 

 Moderate per Echo 

 Thyroid nodule  

 Tricuspid regurgitation  



Past Surgical History: 

Procedure Laterality Date 

 APPENDECTOMY   

 CARDIAC CATHETERIZATION  2016 

 No angiographic evidence of coronary artery disease ([facility]) 

 FIBULA FRACTURE SURGERY   

 HYSTERECTOMY   

 RIGHT HEART CATH  2016 

 RA:  7.  RV:  41/9.  PA:  38/11/20.  Wedge:  15 with V-waves to 25.  LV:  143/1
3, Ascending aortic pressure:  145/61.  CO/CI (Sanna):  3.6/2.1.  LVEF 55%. ([facility]) 

 TONSILLECTOMY AND ADENOIDECTOMY   



Final Medications:

Current Outpatient Prescriptions 

Medication Sig Dispense Refill 

 thyroid, pork, (ARMOUR) 60 mg Tab tablet Take 90 mg by mouth daily.    



No current facility-administered medications for this visit.  



Allergies 

Allergen Reactions 

 Edgard-1 Anaphylaxis 

  All MIRANDA--lidocaine, Novocain............. 

 Lasix [Furosemide] Anaphylaxis 

 Naltrexone Analogues  



Family History 

Problem Relation Age of Onset 

 Heart attack Father  

 Kidney cancer Brother  

 Melanoma Brother  



Social History:

Social History 

Substance Use Topics 

 Smoking status: Never Smoker 

 Smokeless tobacco: Never Used 

 Alcohol use No 



Review of Systems 

Constitution: Positive for malaise/fatigue. Negative for fever and night sweats.
 

HENT: Negative for nosebleeds.  

Cardiovascular: Positive for leg swelling. Negative for chest pain, claudication
, cyanosis, dyspnea on exertion, irregular heartbeat, near-syncope, orthopnea, p
alpitations, paroxysmal nocturnal dyspnea and syncope. 

Respiratory: Negative for cough, hemoptysis, shortness of breath, sleep disturba
nces due to breathing, snoring and wheezing.  

Endocrine: Positive for cold intolerance. Negative for polydipsia. 

Hematologic/Lymphatic: Does not bruise/bleed easily. 

Skin: Negative for rash. 

Musculoskeletal: Negative for joint pain and myalgias. 

Gastrointestinal: Negative for dysphagia, hematochezia, nausea and vomiting. 

Genitourinary: Negative for hematuria. 

Neurological: Negative for brief paralysis, disturbances in coordination, excess
sraah daytime sleepiness, dizziness, focal weakness, light-headedness, loss of bal
ance, numbness and paresthesias. 

All other systems reviewed and are negative.



Vital Signs 

 19 1340 19 1341 

BP: 117/56 113/59 

Pulse: 80  

Weight: 61.7 kg (136 lb)  

Height: 1.575 m (5' 2")  

 

BMI: Body mass index is 24.87 kg/m.



Physical Exam 

Constitutional: She appears well-developed. No distress. 

HENT: 

Head: Normocephalic and atraumatic. 

Eyes: No scleral icterus. 

Neck: Normal range of motion. Neck supple. No JVD present. 

Cardiovascular: Normal rate.  

Murmur heard.

Pulmonary/Chest: Effort normal and breath sounds normal. 

Abdominal: Soft. 

Musculoskeletal: Normal range of motion. She exhibits no edema. 

Neurological: She is alert. No cranial nerve deficit. 

Skin: Skin is warm. 

Psychiatric: She has a normal mood and affect. 

Vitals reviewed.



Cholesterol (no units) 

Date Value 

2019 200 

2016 158 



HDL Cholesterol (mg/dL) 

Date Value 

2019 50 



Triglycerides (mg/dL) 

Date Value 

2019 177 (A) 

2016 80 



LDL Cholesterol 

Date/Time Value Ref Range Status 

2019 02:44  mg/dL Final 

2016 102 mg/dL Final 



EKG:  Sinus rhythm, rate 80, with left bundle morphology.  She has a known left 
bundle branch block.



Encounter Diagnoses 

Name Primary? 

 Chronic diastolic congestive heart failure (HCC) Yes 

 Mitral valve insufficiency, unspecified etiology  

 Bradycardia  

 Laboratory exam ordered as part of routine general medical examination  

 Exertional dyspnea  

 Pulmonary hypertension (HCC)  

 Fatigue, unspecified type  



Impression:

1.  Profound fatigue.  I am not convinced this is of primary cardiac etiology.  
She does self-report a heart rate of 20 beats per minute, though I do feel she i
s not detecting or the monitor is not reporting accurately.  One could consider 
her mitral regurgitation as a source, though her symptoms of dyspnea have not ne
cessarily changed, though she is limited by dyspnea on exertion.

2.  Mitral regurgitation.

3.  Diastolic dysfunction.  She did self-discontinue spironolactone as she felt 
it was contributing to her bradycardia.

4.  Remote history of type 2 myocardial infarction with normal coronary arteries
 per angiogram.



Plan:

1.  I did review risk factors and updates on preventive strategies.

2.  I have suggested laboratory to include CBC, CMP, TSH, and lipid profile to a
ssist in evaluation of profound fatigue.

3.  Zio Patch.  My clinical suspicion that her heart rate at times has been 20 b
eats per minute is low.  However, in the setting of profound fatigue, I do want 
to exclude symptomatic bradycardia.

4.  Cardiac MRI to further evaluate MR.

5.  I will review the results of the above with her with further recommendations
.



Treatment goals, progress, and next steps, as above, were discussed and mutually
 agreed upon with the patient/family.



Thank you for allowing me to participate in Alvina Bansal's care.  If I can be of an
y further assistance, please do not hesitate to contact me.



Sincerely,



Mariam Nguyen MD



/matthew



Electronically signed by Mariam Nguyen MD at 2019 11:49 AM CDT

documented in this encounter



Plan of Treatment





                                        Order Schedule



                 Name            Type            Priority        Associated Diag

noses 

 

                                        1 Occurrences starting 2019 until 

2020



                 Cardiac Monitoring Patch  Cardiac         Routine         Deandre

cardia 



                                         Services   

 

                                        1 Occurrences starting 2019 until 

2020



                 CV MRI Cardiac w wo  Cardiac         Routine         Chronic di

astolic 



                     contrast            Services            congestive heart fa

ilure 



                                         (HCC) 



                                         Mitral valve 



                                         insufficiency, 



                                         unspecified etiology 



documented as of this encounter



Procedures





                                        Comments



                 Procedure Name  Priority        Date/Time       Associated Diag

nosis 

 

                                        



Results for this procedure are in the results section.



                 POCT LIPID PANEL  Routine         2019      Chronic diast

olic 



                           2:44 PM CDT               congestive heart failure 



                                         (HCC) 

 

                                        



Results for this procedure are in the results section.



                 ECG             Routine         2019      Chronic diastol

ic 



                           1:37 PM CDT               congestive heart failure 



                                         (HCC) 



                                         Mitral valve 



                                         insufficiency, 



                                         unspecified etiology 



documented in this encounter



Results

* Thyroid Stimulating Hormone (2019  3:06 PM CDT)



    



              Component    Value        Ref Range    Performed At  Pathologist



                                         Signature

 

    



                 Thyroid         0.15 (L)        0.47 - 4.68 uIU/mL  [facility] 



                           Stimulating               [facility] 



                           Hormone                   [facility] 











                                         Specimen

 





                                         Blood







   



                 Performing Organization  Address         City/State/[facility]  Ph

one Number

 

   



                 [facility]  [address] [place] [address]

  [phone]



                                         LABORATORIES   





* Comprehensive Metabolic Panel (2019  3:06 PM CDT)



    



              Component    Value        Ref Range    Performed At  Pathologist



                                         [facility]

 

    



                 Sodium          138             133 - 147 MEQ/L  [facility] 

 

    



                 Potassium       4.6             3.5 - 5.3 MEQ/L  [facility] 

 

    



                 Chloride        103             96 - 112 MEQ/L  [facility] 

 

    



                 Carbon Dioxide  30              20 - 32 MEQ/L   [facility] 

 

    



                 Anion Gap       6               5 - 17          [facility] 

 

    



                 Calcium         9.3             8.4 - 10.5 mg/dL  [facility] 

 

    



                 Glucose         88              70 - 100 mg/dL  [facility] 

 

    



                 Protein Total   6.5             6.0 - 8.2 g/dL  [facility] 



                           Serum                     [facility] 



                                         [facility] 

 

    



                 Albumin         3.7             3.5 - 5.0 g/dL  [facility] 

 

    



                 Alkaline        85              42 - 140 IU/L   [facility] 



                           Phosphatase               [facility] 



                                         [facility] 

 

    



                 Alanine         25              0 - 34 IU/L     [facility] 



                           Aminotransferas           [facility] 



                           e                         LABORATORIES 

 

    



                 Aspartate       31              15 - 46 IU/L    [facility] 



                           [employer]otransfer[employer]           [facility] 



                           e                         LABORATORIES 

 

    



                 Bilirubin Total  0.4             0.2 - 1.3 mg/dL  [facility] 

 

    



                 Blood Urea      19              7 - 26 mg/dL    [facility] 



                           Nitrogen                  [facility] 



                                         [facility] 

 

    



                 Creatinine      0.7             0.4 - 1.1 mg/dL  [facility] 



                                         [facility] 

 

    



                 eGFR Female AA  96              60 - 200        [facility] 



                           mL/min/1.73sq m           [facility] 



                                         [facility] 

 

    



                 eGFR Female     80              60 - 200        [facility] 



                     Non-AA              mL/min/1.73sq m     [facility] 



                                         [facility] 











                                         Specimen

 





                                         Blood







   



                 Performing Organization  Address         City/[facility]  Ph

one Number

 

   



                 [facility]  [address] [place] [address]

  [phone]



                                         LABORATORIES   





* CBC and Diff (manual diff if necessary) (2019  3:06 PM CDT)



    



              Component    Value        Ref Range    Performed At  Pathologist



                                         Signature

 

    



                 WBC             6.66            4.00 - 11.00 TH/uL  [facility] 



                                         [facility] 

 

    



                 RBC             4.18            4.00 - 5.00 MIL/uL  [facility] 

 

    



                 Hemoglobin      12.9            12.0 - 15.0 g/dL  [facility] 

 

    



                 Hematocrit      39              36 - 45 %       [facility] 

 

    



                 MCV             93              80 - 99 fL      [facility] 

 

    



                 MCH             31              27 - 34 pg      [facility] 

 

    



                 MCHC            33              32 - 36 %       [facility] 

 

    



                 RDW             13.3            11.5 - 14.5 %   [facility] 

 

    



                 Platelet Count  190             140 - 400 TH/uL  [facility] 

 

    



                 MPV             12.2            9.4 - 12.3 fL   [facility] 

 

    



                 Nucleated RBCs  0               0 - 0 /100      [facility] 

 

    



                 % Neutrophils   68              45 - 78 %       [facility] 

 

    



                 %Lymphocytes    17              15 - 47 %       [facility] 

 

    



                 %Monocytes      11              0 - 12 %        [facility] 

 

    



                 %Eosinophils    2               0 - 7 %         [facility] 

 

    



                 %Basophils      1               0 - 2 %         [facility] 

 

    



                 % Imm Grans     0               0 - 1 %         [facility] 

 

    



                 # Granulocytes  4.57            1.70 - 6.80 TH/uL  [facility] 

 

    



                 # Lymphocytes   1.13            1.00 - 3.30 TH/uL  [facility] 

 

    



                 # Monocytes     0.76            0.20 - 0.90 TH/uL  [facility] 

 

    



                 # Eosinophils   0.16            0.00 - 0.40 TH/uL  [facility] 

 

    



                 # Basophils     0.04            0.00 - 0.10 TH/uL  [facility] 











                                         Specimen

 





                                         Blood







   



                 Performing Organization  Address         City/State/Zipcode  Ph

one Number

 

   



                 [facility] [address] [place] [address]

  [phone]



                                         [facility]ATOR[facility]   





* POCT Lipid Panel (2019  2:44 PM CDT)



    



              Component    Value        Ref Range    Performed At  Pathologist



                                         Signature

 

    



                           Cholesterol               200   

 

    



                     HDL Cholesterol     50                  35 - 70 mg/dL  

 

    



                     Triglycerides       177 (A)             40 - 160 mg/dL  

 

    



                     LDL Cholesterol     115                 mg/dL  

 

    



                           Non-HDL                   150   



                                         Cholesterol    

 

    



                           Cholesterol/HDL           4.0   



                                         Ratio    

 

    



                     Glucose             89                  mg/dL  











                                         Specimen

 





                                         Blood







 



                           Narrative                 Performed At

 

 



                                         This result has an attachment that is n

ot available. 





* Electrocardiogram (ECG) (2019  1:37 PM CDT)



    



              Component    Value        Ref Range    Performed At  Pathologist



                                         Signature

 

    



                     QRSd                118                 [facility] 

 

    



                     QT                  416                 [facility] 

 

    



                     QTC                 477                 [facility] 

 

    



                     ECGHR               79                  [facility] 

 

    



                     ECGPR               184                 [facility] 











                                         Specimen

 









 



                           Narrative                 Performed At

 

 



                           This result has an attachment that is not available. 

 [facility]



                                                      

   [facility] 



                                                      

      



                                                      

      Test Date:  



                                         2019 



                                         Pat Name:   ALVINA BANSAL     

   Department:  [facility] 



                                         Patient ID:  [id number]      

   Room:     



                                         Gender:    Female      

    Technician:  DMITRI 



                                         :     1935    

    Requested By: MARIAM NGUYEN 



                                         Order Number: [id number]      

  Reading MD:  Mariam Nguyen 



                                                      

   Measurements 



                                         Intervals          

     Axis      



                                         Rate:     79       

     P:      45 



                                         RI:      184      

     QRS:     -10 



                                         QRSD:     118      

     T:      87 



                                         QT:      416      

             



                                         QTc:     477      

             



                                                      

Interpretive Statements 



                                         SINUS RHYTHM 



                                         INCOMPLETE LEFT BUNDLE BRANCH BLOCK 



                                         Electronically Signed On 2019 22:47

:51 CDT by Mariam Nguyen 







                                        Procedure Note

 

                                        



Interface, External Ris In 2019 10:48 PM CDT



                                 [facility]

                                       

                                       Test Date:    2019

Pat Name:     ALVINA MARTINJAXON                Department:   [facility]

Patient ID:   [id number]                 Room:         

Gender:       Female                   Technician:   DMITRI

:          1935               Requested By: MARIAM NGUYEN 

Order Number: [id number]                Reading MD:   Mariam Nguyen

                                 Measurements

Intervals                              Axis          

Rate:         79                       P:            45

RI:           184                      QRS:          -10

QRSD:         118                      T:            87

QT:           416                                    

QTc:          477                                    

                           Interpretive Statements

SINUS RHYTHM

INCOMPLETE LEFT BUNDLE BRANCH BLOCK

Electronically Signed On 2019 22:47:51 CDT by Mariam Nguyen







   



                 Performing Organization  Address         City/State/Zipcode  Ph

one Number

 

   



                                         [facility]   





documented in this encounter



Visit Diagnoses









                                         Diagnosis

 





                                         Chronic diastolic congestive heart fail

ure (HCC)

 





                                         Mitral valve insufficiency, unspecified

 etiology

 





                                         Bradycardia



                                         Other specified cardiac dysrhythmias

 





                                         Laboratory exam ordered as part of rout

ine general medical examination



                                         Laboratory examination ordered as part 

of a routine general medical examination

 





                                         Exertional dyspnea



                                         Other dyspnea and respiratory abnormali

ty

 





                                         Pulmonary hypertension (HCC)



                                         Other chronic pulmonary heart diseases

 





                                         Fatigue, unspecified type



documented in this encounter

## 2020-03-13 NOTE — XMS REPORT
Encounter Summary

                             Created on: 2020



Alvina Charles

External Reference #: [id number]

: 1935

Sex: Female



Demographics





                          Address                   [address] [place]  [address]

 

                          Home Phone                [phone]

 

                          Preferred Language        English

 

                          Marital Status            

 

                          Amish Affiliation     [id number]

 

                          Race                      White

 

                          Ethnic Group              Not  or 





Author





                          Author                    [facility]

 

                          Organization              [facility]

 

                          Address                   Unknown

 

                          Phone                     Unavailable







Support





                Name            Relationship    Address         Phone

 

                Krista Honeycutt   [employer]            Unknown         [phone]







Care Team Providers





                    Care Team Member Name Role                Phone

 

                    Hutchison Payam   PCP                 [phone]







Encounter Details





                          Care Team                 Description



                     Date                Type                Department  

 

                                        



Tala Mullins RN                     



                     2019          Telephone           [facility]  



                                         [facility]  



                                         [facility]  



                                         [address]  



                                         [address]  



                                         [place] [address]  



                                         [phone]  







Social History





                                        Date



                 Tobacco Use     Types           Packs/Day       Years Used 

 

                                         



                                         Never Smoker    

 

    



                                         Smokeless Tobacco: Never   



                                         Used   







                    Drinks/Week         oz/Week             Comments



                                         Alcohol Use   

 

                                                             



                                         No   







 



                           Sex Assigned at Birth     Date Recorded

 

 



                                         Not on file 







                                        Industry



                           Job Start Date            Occupation 

 

                                        Not on file



                           Not on file               Not on file 







                                        Travel End



                           Travel History            Travel Start 

 





                                         No recent travel history available.



documented as of this encounter



Miscellaneous Notes

* Telephone Encounter - Cydney Mullins RN - 2019 11:25 AM CDT



I called and spoke w/ arsenio Kolb's dghtr. I let her know Dr. Nguyen was updated r
egarding the LBBB seen at the PCP office. I let her know we have not received th
e ECG from the PCP office, but Dr. Nguyen reports the LBBB is known, and if pt 
is having concerning symptoms we could always schedule her for a revaluation. Darin castelan v/u. Kennedi reports pt's main concern is extreme fatigue, which is why she saw 
her PCP. 

I let Kennedi know we would request the ECG from the PCP office and ask Dr. Nguyen
to review. Kennedi v/u. 



Electronically signed by Cydney Mullins RN at 2019 11:31 AM CDT

* Telephone Encounter - Mariam Nguyen MD - 2019  1:36 PM CDT



She has known LBBB

o extra eval



Electronically signed by Mariam Nguyen MD at 2019  1:36 PM CDT

* Telephone Encounter - Tala Mullins RN - 2019 12:39 PM CDT



Live call from pt's daughter stating she was at her PCP's office yesterday and t
hey got a call this am stating pt had LBBB show up on her ECG.

In review of pt chart I state there was an ECG done in our system 19 that sh
owed an incomplete LBBB, so this is not a totally new finding. I ask if pt has f
elt any abnormal heart beats/palpitations, CP, SOA, etc. and she states no, in g
eneral she says pt has felt the same as when Dr. Nguyen saw her on 19.

Stated I will call PCP's office to request the ECG they did be sent to us via fa
x attn Dr. Nguyen, and will send Dr. Nguyen a message asking her to advise if 
she has any recs in the interim. Was only able to LVM at PCP Dr. Franks's offic
e requesting ECG and routing to Dr. Nguyen. 



Electronically signed by Tala Mullins RN at 2019 12:46 PM CDT

documented in this encounter



Plan of Treatment





Not on filedocumented as of this encounter



Visit Diagnoses

Not on filedocumented in this encounter

## 2020-03-13 NOTE — XMS REPORT
Encounter Summary

                             Created on: 2020



Alvina Charles

External Reference #: [id number]

: 1935

Sex: Female



Demographics





                          Address                   [address] [place]  [address]

 

                          Home Phone                [phone]

 

                          Preferred Language        English

 

                          Marital Status            

 

                          Nondenominational Affiliation     [id number]

 

                          Race                      White

 

                          Ethnic Group              Not  or 





Author





                          Author                    [facility]

 

                          Organization              [facility]

 

                          Address                   Unknown

 

                          Phone                     Unavailable







Support





                Name            Relationship    Address         Phone

 

                Krista Honeycutt   EC[employer]            Unknown         [phone]







Care Team Providers





                    Care Team Member Name Role                Phone

 

                    Payam Hutchison   PCP                 [phone]







Encounter Details





                          Care Team                 Description



                     Date                Type                Department  

 

                                        



Loulou Acevedo RN                     



                     2018          Telephone           [facility]  



                                         [facility]  



                                         [facility]  



                                         [address]  



                                         [address]  



                                         [place] [address] [phone]  







Social History





                                        Date



                 Tobacco Use     Types           Packs/Day       Years Used 

 

                                         



                                         Never Smoker    

 

    



                                         Smokeless Tobacco: Never   



                                         Used   







                    Drinks/Week         oz/Week             Comments



                                         Alcohol Use   

 

                                                             



                                         No   







 



                           Sex Assigned at Birth     Date Recorded

 

 



                                         Not on file 







                                        Industry



                           Job Start Date            Occupation 

 

                                        Not on file



                           Not on file               Not on file 







                                        Travel End



                           Travel History            Travel Start 

 





                                         No recent travel history available.



documented as of this encounter



Miscellaneous Notes

* Telephone Encounter - Rita Quan LPN - 2018 10:38 AM CDT



- Pt's daughter called in this AM to report that she is not feeling well and 
I snot able to make her appt and will just come to the ER. Appt was cancelled by
daughter that was made on 18 by my nurse partner. Reviewed chart and called
[facility] and Rounding Nurse to advise that pt will be heading in by private vehi
nico. Pt was going to have labs completed on yesterday so I am sending a request 
to [facility] and see if they can get those records in the chart. 



Electronically signed by Rita Quan LPN at 2018 10:42 AM CDT

* Telephone Encounter - Loulou Acevedo RN - 2018  2:08 PM CDT



Received call from [facility] requesting a diagnosis code for BNP. Provided chronic
diastolic HF ICD-10-CM:I50.32. Lab stated this is sufficient.



Electronically signed by Loulou Acevedo RN at 2018  2:10 PM CDT

* Telephone Encounter - vKng Jean-Baptiste RN - 2018 12:32 PM CDT



Received [facility] recs.  Made call to patient and spoke with daughter and patient.  VINCENT zhong would like to have lab drawn today at [facility] lab in [place] rather than comin
g to [facility] earlier than appointment to have drawn.  Stated that would be fine.  Lab
s ordered and faxed to [phone] with confirmation fax.



Electronically signed by Kvng Jean-Baptiste RN at 2018  1:03 PM CDT

* Telephone Encounter - Sammie Arellano MD - 2018 12:22 PM CDT



Yes would like CBC, BMP, BNP, Mg, stay on lasix until seen tomorrow.



Electronically signed by Sammie Arellano MD at 2018 12:22 PM CDT

* Telephone Encounter - Loulou Acevedo RN - 2018 10:38 AM CDT



Received life transfer call from  stating the daughter of the pt is on 
the line. Daughter expressed concern that pt cannot get in to see [facility] for a few 
months but is having new symptoms.



Daughter put pt on the line and reported that often at night she will wake up wi
th "Intense discomfort in my chest.. I get real nauseated." pt reports "activity
in my chest" and later described as palpitations.



Asked pt if she counts her pulse or checks her blood pressure when this happens 
and for how long they symptoms last. 

Pt reports she is unaware of time frame due to being" sleepy and droggy ". Pt wi
ll fall back asleep and then wake up again later with the same symptoms.



Asked pt if she has symptoms during the daytime. Pt states she, "does not really
notice during the day...I can't really tell." Pt denies nausea during the day b
ut feels palpitations



Takes pulse during the day and describes it as "real erratic type of beats" feel
s irregular "not real fast.. About 70s-80s"



Bp: "When I check my blood pressures sometimes it dips low 80s/40s other times a
real wide pulse pressure" Pt does not keep a log, therefore no exact numbers to 
report.



Denies sob and lightheaded/dizzy, but reports feeling "exhausted all the time"



Noted to pt her history of heart failure. Inquired if she is noticing swelling o
r signs of fluid retention.



Pt reports she "I got off the diuretic for a while but I got back on it 2-3 week
s ago because I noticed swelling"



Pt states her swelling is okay in the morning, but  in LE swell in the evenings;
abdomen "looks normal in the mornings, but my abdomen looks 6 mo pregnant by 2:
00 in the afternoon"



Pt weighs daily, but does not keep a log. Pt recalls she used to regularly weigh
in the 120s, but lately  Weighs in the 130s.



Provided pt with homework to check bp/pulse, and weights daily and to keep a log
/write on a calendar. Instructed pt to check bp/pulse extra with symptoms, inclu
ding during the night. Instructed to call for wt gain 2lbs/24hrs, 3lbs/48hrs, or
5lbs in a week... Provided education that extra fluid on the heart can cause ad
luzma stress to the heart. Pt v/u



Instructed pt call 911/have someone drive her to ED if having a low bp or fast H
R and symptoms (sob, light headed/dizzy, nauseated). Pt v/u.



Informed pt [facility] will be notified for recs, but also called schedulers to arrange
urgent appt [facility] tomorrow 18 check in at 1:30 p.m. With Dr. Hughes.



Pt would like any ordered labs to be faxed to [facility] in [place], [place] pt voiced
agreement with plans.



Will route to [facility].







Electronically signed by Loulou Acevedo RN at 2018 11:35 AM CDT

documented in this encounter



Plan of Treatment





Not on filedocumented as of this encounter



Procedures





                                        Comments



                 Procedure Name  Priority        Date/Time       Associated Diag

nosis 

 

                                        



Results for this procedure are in the results section.



                 NTPROBNP        Routine         2018      Other fatigue 



                                         Palpitations 



                                         Other chest pain 

 

                                        



Results for this procedure are in the results section.



                 MAGNESIUM       Routine         2018      Other fatigue 



                                         Palpitations 



                                         Other chest pain 

 

                                        



Results for this procedure are in the results section.



                 CBC AND DIFF (MANUAL DIFF  Routine         2018      Othe

r fatigue 



                           IF NECESSARY)             Palpitations 



                                         Other chest pain 

 

                                        



Results for this procedure are in the results section.



                 BASIC METABOLIC PANEL  Routine         2018      Other fa

tigue 



                                         Palpitations 



                                         Other chest pain 



documented in this encounter



Results

* NTproBNP (2018)



    



              Component    Value        Ref Range    Performed At  Pathologist



                                         [facility]

 

    



                 NTproBNP        142 (A)         0 - 100         [facility] 











                                         Specimen

 





                                         Blood







   



                 Performing Organization  Address         City/State/[facility]

one Number

 

   



                     [facility]           [address] [place] [address]

52 





* Magnesium (2018)



    



              Component    Value        Ref Range    Performed At  Pathologist



                                         Signature

 

    



                 Magnesium       2.1             1.6 - 2.6 mg/dL  [facility] 











                                         Specimen

 





                                         Blood







   



                 Performing Organization  Address         City/State/[facility]

one Number

 

   



                     [facility]           [address] [place] [address]

52 





* CBC and Diff (manual diff if necessary) (2018)



    



              Component    Value        Ref Range    Performed At  Pathologist



                                         Signature

 

    



                 RBC             4.33            3.6 - 5.00      [facility] 

 

    



                           Hematology                [facility] 



                                         Comments    

 

    



                 WBC             5.19            5.0 - 10.0      [facility] 

 

    



                 Hemoglobin      13.4            12.0 - 16.0 g/dL  [facility] 

 

    



                 Hematocrit      41              36 - 46 %       [facility] 

 

    



                 MCV             94.5            82.0 - 108.0 fL  [facility] 

 

    



                 MCH             30.9            26.0 - 34.0 pg  [facility] 

 

    



                 MCHC            32              30 - 37 g/dL    [facility] 

 

    



                 % Neutrophils   63              37 - 80 %       [facility] 

 

    



                           % Imm Grans               [facility] 

 

    



                 %Lymphocytes    27              10 - 50 %       [facility] 

 

    



                 %Monocytes      8               0 - 12 %        [facility] 

 

    



                 %Eosinophils    2               0 - 6 %         [facility] 

 

    



                 %Basophils      0               0 - 3 %         [facility] 

 

    



                 Platelet Count  199             150 - 399 K/L  [facility] 

 

    



                           # Granulocytes            [facility] 

 

    



                           Immature Grans            [facility] 



                                         (Abs)    

 

    



                 Neutrophils     3.27            2.0 - 6.9       [facility] 



                                         Absolute    

 

    



                 # Lymphocytes   1.40            0.60 - 3.40 /L  [facility] 

 

    



                 # Monocytes     0.40            0.20 - 0.80 /L  [facility] 

 

    



                 # Eosinophils   0.10 (A)        0.20 - 0.70 /L  [facility] 

 

    



                 # Basophils     0.00            0.00 - 0.20 /L  [facility] 

 

    



                     Nucleated RBCs      0 - 2 /100          [facility] 

 

    



                 RDW             13.8            11.5 - 14.5 %   [facility] 











                                         Specimen

 





                                         Blood







 



                           Narrative                 Performed At

 

 



                                         This result has an attachment that is n

ot available. 







   



                 Performing Organization  Address         City/State/[facility]

one Number

 

   



                     [facility]           [address] [address] [place] [id number]

52 





* Basic Metabolic Panel (2018)



    



              Component    Value        Ref Range    Performed At  Pathologist



                                         Signature

 

    



                 Calcium         8.8             8.5 - 10.8 mg/dL  [facility] 

 

    



                 Glucose         164 (A)         60 - 125 mg/dL  [facility] 

 

    



                 Blood Urea      14              9 - 27 mg/dL    [facility] 



                                         Nitrogen    

 

    



                 Potassium       4.2             3.4 - 5.3 mmol/L  [facility] 

 

    



                 Sodium          140             132 - 145 mmol/L  [facility] 

 

    



                 Chloride        101             95 - 110 mmol/L  [facility] 

 

    



                 Creatinine      0.8             0.6 - 1.5 mg/dL  [facility] 

 

    



                 Carbon Dioxide  31              24 - 34 mmol/L  [facility] 

 

    



                 BUN/Creatinine  17.3            12 - 20         [facility] 



                                         Ratio    

 

    



                           eGFR If                   [facility] 



                                         NonAfricn Am    

 

    



                           eGFR If Africn            [facility] 



                                         Am    











                                         Specimen

 





                                         Blood







 



                           Narrative                 Performed At

 

 



                                         This result has an attachment that is n

ot available. 







   



                 Performing Organization  Address         City/State/Zipcode  Ph

one Number

 

   



                     [facility]           [address] [place] [address]

52 





documented in this encounter



Visit Diagnoses









                                         Diagnosis

 





                                         Other fatigue

 





                                         Palpitations

 





                                         Other chest pain



documented in this encounter

## 2020-03-13 NOTE — XMS REPORT
Encounter Summary

                             Created on: 2020



Alvina Charles

External Reference #: [id number]

: 1935

Sex: Female



Demographics





                          Address                   [address] [place]  [address]

 

                          Home Phone                [phone]

 

                          Preferred Language        English

 

                          Marital Status            

 

                          Sabianist Affiliation     [id number]

 

                          Race                      White

 

                          Ethnic Group              Not  or 





Author





                          Author                    [facility]

 

                          Organization              [facility]

 

                          Address                   Unknown

 

                          Phone                     Unavailable







Support





                Name            Relationship    Address         Phone

 

                Krista Honeycutt   [employer]            Unknown         [phone]







Care Team Providers





                    Care Team Member Name Role                Phone

 

                    Payam Hutchison   PCP                 [phone]







Encounter Details





                          Care Team                 Description



                     Date                Type                Department  

 

                                        



Mariam Nguyen MD



[address] [facility] 



[place] [address] [phone] (Fax)                       



                     08/10/2018          Documentation       [facility]  



                                         [facility]  



                                         [facility]  



                                         [address] [place] [address]  



                                         [address]  



                                         [place] [address] [phone]  







Social History





                                        Date



                 Tobacco Use     Types           Packs/Day       Years Used 

 

                                         



                                         Never Smoker    

 

    



                                         Smokeless Tobacco: Never   



                                         Used   







                    Drinks/Week         oz/Week             Comments



                                         Alcohol Use   

 

                                                             



                                         No   







 



                           Sex Assigned at Birth     Date Recorded

 

 



                                         Not on file 







                                        Industry



                           Job Start Date            Occupation 

 

                                        Not on file



                           Not on file               Not on file 







                                        Travel End



                           Travel History            Travel Start 

 





                                         No recent travel history available.



documented as of this encounter



Plan of Treatment





Not on filedocumented as of this encounter



Visit Diagnoses

Not on filedocumented in this encounter

## 2020-03-13 NOTE — XMS REPORT
Encounter Summary

                             Created on: 2020



Alvina Charles

External Reference #: [id number]

: 1935

Sex: Female



Demographics





                          Address                   [address] [place]  [address]

 

                          Home Phone                [phone]

 

                          Preferred Language        English

 

                          Marital Status            

 

                          Worship Affiliation     [id number]

 

                          Race                      White

 

                          Ethnic Group              Not  or 





Author





                          Author                    [facility]

 

                          Organization              [facility]

 

                          Address                   Unknown

 

                          Phone                     Unavailable







Support





                Name            Relationship    Address         Phone

 

                Krista Honeycutt   [employer]            Unknown         [phone]







Care Team Providers





                    Care Team Member Name Role                Phone

 

                    Payam Hutchison   PCP                 [phone]







Encounter Details





                          Care Team                 Description



                     Date                Type                Department  

 

                                        



Mariam Nguyen MD



[address]



[place] 



[place] [address]



[phone] (Fax)                       



                     10/01/2019          Documentation       [facility]  



                                         [facility]  



                                         [facility]  



                                         [address]  



                                         [address]   



                                         [place] [address]  



                                         [phone]  







Social History





                                        Date



                 Tobacco Use     Types           Packs/Day       Years Used 

 

                                         



                                         Never Smoker    

 

    



                                         Smokeless Tobacco: Never   



                                         Used   







                    Drinks/Week         oz/Week             Comments



                                         Alcohol Use   

 

                                                             



                                         No   







 



                           Sex Assigned at Birth     Date Recorded

 

 



                                         Not on file 







                                        Industry



                           Job Start Date            Occupation 

 

                                        Not on file



                           Not on file               Not on file 







                                        Travel End



                           Travel History            Travel Start 

 





                                         No recent travel history available.



documented as of this encounter



Plan of Treatment





Not on filedocumented as of this encounter



Procedures





                                        Comments



                 Procedure Name  Priority        Date/Time       Associated Diag

nosis 

 

                                         



                     EKG OUTSIDE RECORD  Routine             2019  



                                         9:37 AM CDT  



documented in this encounter



Visit Diagnoses

Not on filedocumented in this encounter

## 2020-03-13 NOTE — XMS REPORT
Encounter Summary

                             Created on: 2020



Alvina Charles

External Reference #: [id number]

: 1935

Sex: Female



Demographics





                          Address                   [address] [place]  [address]

 

                          Home Phone                [phone]

 

                          Preferred Language        English

 

                          Marital Status            

 

                          Sikh Affiliation     1013

 

                          Race                      White

 

                          Ethnic Group              Not  or 





Author





                          Author                    [facility]

 

                          Organization              [facility]

 

                          Address                   Unknown

 

                          Phone                     Unavailable







Support





                Name            Relationship    Address         Phone

 

                Krista Honeycutt   [employer]            Unknown         [phone]







Care Team Providers





                    Care Team Member Name Role                Phone

 

                    Payam Hutchison   PCP                 [phone]







Reason for Visit

* 



 



                           Reason                    Comments

 

 



                                         [facility] 









Encounter Details





                          Care Team                 Description



                     Date                Type                Department  

 

                                        



Deann Ahumada, RN                    [facility]



                     2017          Telephone           [facility]  



                                         [facility]  



                                         [facility]  



                                         [address]  



                                         [address]  



                                         [place] [address]  



                                         [phone]  







Social History





                                        Date



                 Tobacco Use     Types           Packs/Day       Years Used 

 

                                         



                                         Never Smoker    

 

    



                                         Smokeless Tobacco: Never   



                                         Used   







                    Drinks/Week         oz/Week             Comments



                                         Alcohol Use   

 

                                                             



                                         No   







 



                           Sex Assigned at Birth     Date Recorded

 

 



                                         Not on file 







                                        Industry



                           Job Start Date            Occupation 

 

                                        Not on file



                           Not on file               Not on file 







                                        Travel End



                           Travel History            Travel Start 

 





                                         No recent travel history available.



documented as of this encounter



Miscellaneous Notes

* Telephone Encounter - Hannah Mccormack RN - 2017  3:06 PM CDT



CD of Echo arrived today.  Given to [facility] for review.  



Electronically signed by Hannah Mccormack RN at 2017  3:06 PM CDT

* Telephone Encounter - Ronda Foreman - 2017 11:26 AM CDT



 pt is scheduled with [facility] on 



Electronically signed by Ronda Foreman at 2017 11:26 AM CDT

* Telephone Encounter - Deann Ahumada RN - 2017  4:44 PM CDT



3-23-17 - pt called to report that she had her Echo completed at [facility] on 3-17-17 and wanted to make sure we were able to get the CD. Called and 
notified pt we would request. Also she asked to get follow up appt with [facility]. Malick carbajal route message to [facility] and schedulers. 



Electronically signed by Deann Ahumada RN at 2017  4:46 PM CDT

documented in this encounter



Plan of Treatment





Not on filedocumented as of this encounter



Visit Diagnoses

Not on filedocumented in this encounter

## 2020-03-13 NOTE — XMS REPORT
Encounter Summary

                             Created on: 2020



Alvina Charles

External Reference #: [id number]

: 1935

Sex: Female



Demographics





                          Address                   [address] [place]  [address]

 

                          Home Phone                [phone]

 

                          Preferred Language        English

 

                          Marital Status            

 

                          Restorationism Affiliation     1013

 

                          Race                      White

 

                          Ethnic Group              Not  or 





Author





                          Author                    [facility]

 

                          Organization              [facility]

 

                          Address                   Unknown

 

                          Phone                     Unavailable







Support





                Name            Relationship    Address         Phone

 

                Krista Honeycutt   [employer]            Unknown         [phone]







Care Team Providers





                    Care Team Member Name Role                Phone

 

                    Payam Hutchison   PCP                 [phone]







Encounter Details





                          Care Team                 Description



                     Date                Type                Department  

 

                                        



Wai Ji MD



[address] 



[place] [address]



[phone] (Fax)                       



                     2016          Documentation       [facility]  



                                         [facility]  



                                         [facility]  



                                         [address]  



                                         [address]  



                                         [place] [address]  



                                         [phone]  







Social History





                                        Date



                 Tobacco Use     Types           Packs/Day       Years Used 

 

                                         



                                         Never Smoker    

 

    



                                         Smokeless Tobacco: Never   



                                         Used   







                    Drinks/Week         oz/Week             Comments



                                         Alcohol Use   

 

                                                             



                                         No   







 



                           Sex Assigned at Birth     Date Recorded

 

 



                                         Not on file 







                                        Industry



                           Job Start Date            Occupation 

 

                                        Not on file



                           Not on file               Not on file 







                                        Travel End



                           Travel History            Travel Start 

 





                                         No recent travel history available.



documented as of this encounter



Plan of Treatment





Not on filedocumented as of this encounter



Visit Diagnoses

Not on filedocumented in this encounter

## 2020-03-13 NOTE — XMS REPORT
Encounter Summary

                             Created on: 2020



Alvina Charles

External Reference #: [id number]

: 1935

Sex: Female



Demographics





                          Address                   [address] [place]  [address]

 

                          Home Phone                [phone]

 

                          Preferred Language        English

 

                          Marital Status            

 

                          Orthodoxy Affiliation     1013

 

                          Race                      White

 

                          Ethnic Group              Not  or 





Author





                          Author                    [facility]

 

                          Organization              [facility]

 

                          Address                   Unknown

 

                          Phone                     Unavailable







Support





                Name            Relationship    Address         Phone

 

                Krista Honeycutt   [employer]            Unknown         [phone]







Care Team Providers





                    Care Team Member Name Role                Phone

 

                    HutchisonPayam coles   PCP                 [phone]







Reason for Visit

* 



 



                           Reason                    Comments

 

 



                                         Lab results 









Encounter Details





                          Care Team                 Description



                     Date                Type                Department  

 

                                        



Tala Mullins RN                    Lab results



                     2018          Telephone           [facility]  



                                         [facility]  



                                         [address]  



                                         [address]  



                                         [place] [address]  



                                         [phone]  







Social History





                                        Date



                 Tobacco Use     Types           Packs/Day       Years Used 

 

                                         



                                         Never Smoker    

 

    



                                         Smokeless Tobacco: Never   



                                         Used   







                    Drinks/Week         oz/Week             Comments



                                         Alcohol Use   

 

                                                             



                                         No   







 



                           Sex Assigned at Birth     Date Recorded

 

 



                                         Not on file 







                                        Industry



                           Job Start Date            Occupation 

 

                                        Not on file



                           Not on file               Not on file 







                                        Travel End



                           Travel History            Travel Start 

 





                                         No recent travel history available.



documented as of this encounter



Miscellaneous Notes

* Telephone Encounter - Tala Mullins RN - 2018  2:40 PM CDT



Noted results of CBC, BMP, Mag & NTproBNP w/ recs per [facility], called pt to discuss.
Was not able to reach pt but LVM at home # explaining overall results w/ glucose
and NTproBNP being elevated. Stated we hope she was seen in an ER as indicated 
on  and asked her to please call and update us when she has a chance. halina



Electronically signed by aTla Mullins RN at 2018  5:27 PM CDT

* Telephone Encounter - Tala Mullins RN - 2018  2:40 PM CDT



----- Message from Sammie Arellano MD sent at 2018 11:18 AM CDT -----

Normal blood counts, kidney function and blood salts.

Suggestion of elevated pressure in the heart which may relate to retained fluid 
and recommend continuing on lasix.

Blood sugar mildly elevated, follow-up with PCP.

I see notes that she couldn't come to appointment yesterday and was going to go 
to ER but no ER notes.  Can we follow-up please to ensure she has appt planned.



Electronically signed by Tala Mullins RN at 2018  2:40 PM CDT

documented in this encounter



Plan of Treatment





Not on filedocumented as of this encounter



Visit Diagnoses

Not on filedocumented in this encounter

## 2020-03-13 NOTE — XMS REPORT
Encounter Summary

                             Created on: 2020



Alvina Charles

External Reference #: [id number]

: 1935

Sex: Female



Demographics





                          Address                   [address] [place]  [address]

 

                          Home Phone                [phone]

 

                          Preferred Language        English

 

                          Marital Status            

 

                          Taoism Affiliation     1013

 

                          Race                      White

 

                          Ethnic Group              Not  or 





Author





                          Author                    [facility]

 

                          Organization              [facility]

 

                          Address                   Unknown

 

                          Phone                     Unavailable







Support





                Name            Relationship    Address         Phone

 

                Krista Honeycutt   [employer]            Unknown         [phone]







Care Team Providers





                    Care Team Member Name Role                Phone

 

                    Payam Hutchison   PCP                 [phone]







Reason for Visit

* 



 



                           Reason                    Comments

 

 



                                         Lab results 









Encounter Details





                          Care Team                 Description



                     Date                Type                Department  

 

                                        



Chiara Alberto RN               Lab results



                     2019          Telephone           [facility]  



                                         [facility]  



                                         [facility]  



                                         [address]  



                                         [address]  



                                         [place], [place] [address] [phone]  







Social History





                                        Date



                 Tobacco Use     Types           Packs/Day       Years Used 

 

                                         



                                         Never Smoker    

 

    



                                         Smokeless Tobacco: Never   



                                         Used   







                    Drinks/Week         oz/Week             Comments



                                         Alcohol Use   

 

                                                             



                                         No   







 



                           Sex Assigned at Birth     Date Recorded

 

 



                                         Not on file 







                                        Industry



                           Job Start Date            Occupation 

 

                                        Not on file



                           Not on file               Not on file 







                                        Travel End



                           Travel History            Travel Start 

 





                                         No recent travel history available.



documented as of this encounter



Miscellaneous Notes

* Telephone Encounter - Chiara Alberto RN - 2019  3:20 PM CDT



Reviewed results of most recent lab and Dr. Muhammad's recommendations with Alvina. Marion
e verbalized understanding and is in agreement with plan. Forwarded results to p
cp.  Pt has has a call into pcp and will address when she talks to them as well.




Electronically signed by Chiara Alberto RN at 2019  3:21 PM CDT

* Telephone Encounter - Chiara Alberto RN - 2019  3:15 PM CDT



----- Message from Mariam Nguyen MD sent at 2019  8:46 AM CDT -----

Please send results to her PCP or who is managing her thyroid med; may have role
in her sxs



Electronically signed by Chiara Alberto RN at 2019  3:15 PM CDT

documented in this encounter



Plan of Treatment





Not on filedocumented as of this encounter



Visit Diagnoses

Not on filedocumented in this encounter

## 2020-03-13 NOTE — XMS REPORT
Encounter Summary

                             Created on: 2020



Alvina Charles

External Reference #: [id number]

: 1935

Sex: Female



Demographics





                          Address                   [address] [place]  [address]

 

                          Home Phone                [phone]

 

                          Preferred Language        English

 

                          Marital Status            

 

                          Presybeterian Affiliation     [id number]

 

                          Race                      White

 

                          Ethnic Group              Not  or 





Author





                          Author                    [facility]

 

                          Organization              [facility]

 

                          Address                   Unknown

 

                          Phone                     Unavailable







Support





                Name            Relationship    Address         Phone

 

                Krista Honeycutt   EC[employer]            Unknown         [phone]







Care Team Providers





                    Care Team Member Name Role                Phone

 

                    Payam Hutchison   PCP                 [phone]







Encounter Details





                          Care Team                 Description



                     Date                Type                Department  

 

                                        



Mariam Nguyen MD



[address]



[address]



[place], [place] [id number] [phone] (Fax)                      Laboratory exam ordered as part of [facility] general medical 

examination; 

Bradycardia



                     2019          Lab                 [facility]

al  



                                         [address]  



                                         [address]  



                                         [place] [address]  



                                         [phone]  







Social History





                                        Date



                 Tobacco Use     Types           Packs/Day       Years Used 

 

                                         



                                         Never Smoker    

 

    



                                         Smokeless Tobacco: Never   



                                         Used   







                    Drinks/Week         oz/Week             Comments



                                         Alcohol Use   

 

                                                             



                                         No   







 



                           Sex Assigned at Birth     Date Recorded

 

 



                                         Not on file 







                                        Industry



                           Job Start Date            Occupation 

 

                                        Not on file



                           Not on file               Not on file 







                                        Travel End



                           Travel History            Travel Start 

 





                                         No recent travel history available.



documented as of this encounter



Plan of Treatment





Not on filedocumented as of this encounter



Procedures





                                        Comments



                 Procedure Name  Priority        Date/Time       Associated Diag

nosis 

 

                                        



Results for this procedure are in the results section.



                     TRIIODOTHYRONINE    Add-On              2019  



                                         3:06 PM CDT  

 

                                        



Results for this procedure are in the results section.



                 THYROID STIMULATING  Routine         2019      [facility]

a 



                     HORMONE             3:06 PM CDT         Laboratory exam ord

ered 



                                         as part of routine 



                                         general medical 



                                         examination 

 

                                        



Results for this procedure are in the results section.



                     THYROID CASCADE     Add-On              2019  



                                         3:06 PM CDT  

 

                                        



Results for this procedure are in the results section.



                     T4 FREE             Add-On              2019  



                                         3:06 PM CDT  

 

                                        



Results for this procedure are in the results section.



                 COMPREHENSIVE METABOLIC  Routine         2019      Labora

tory exam ordered 



                     PANEL               3:06 PM CDT         as part of routine 



                                         general medical 



                                         examination 

 

                                        



Results for this procedure are in the results section.



                 CBC AND DIFF (MANUAL DIFF  Routine         2019      [facility]

ratory exam ordered 



                     IF NECESSARY)       3:06 PM CDT         as part of routine 



                                         general medical 



                                         examination 



documented in this encounter



Results

* Triiodothyronine (2019  3:06 PM CDT)



    



              Component    Value        Ref Range    Performed At  Pathologist



                                         Signature

 

    



                 Triiodothyronin  1.0             1.0 - 1.7 ng/mL  [facility] 











                                         Specimen

 





                                         Blood







   



                 Performing Organization  Address         City/[facility]

one Number

 

   



                 [facility] [address] [place] [address]

  [phone]



                                         LAB[facility]ATORIES   





* T4 Free (2019  3:06 PM CDT)



    



              Component    Value        Ref Range    Performed At  Pathologist



                                         Signature

 

    



                 T4 Free         1.0             0.8 - 2.2 ng/dL  [facility] 











                                         Specimen

 





                                         Blood







   



                 Performing Organization  Address         City/[facility]

one Number

 

   



                 [facility] [address] [place] [address]

  [phone]



                                         LABORATORIES   





* Thyroid Reagan (2019  3:06 PM CDT)



    



              Component    Value        Ref Range    Performed At  Pathologist



                                         Signature

 

    



                 Thyroid         0.14 (L)        0.47 - 4.68 uIU/mL  [facility] 



                           [facility]                   [facility] 











                                         Specimen

 





                                         Blood







   



                 Performing Organization  Address         City/[facility]

one Number

 

   



                 [facility] [address] [place] [address]

  [phone]



                                         LABORATORIES   





* Thyroid Stimulating Hormone (2019  3:06 PM CDT)



    



              Component    Value        Ref Range    Performed At  Pathologist



                                         Signature

 

    



                 Thyroid         0.15 (L)        0.47 - 4.68 uIU/mL  [facility] 



                           [facility]               [facility] 



                           Hormone                   [facility] 











                                         Specimen

 





                                         Blood







   



                 Performing Organization  Address         City/[facility]

one Number

 

   



                 [facility] [address] [place] [address]

  [phone]



                                         LABORATORIES   





* Comprehensive Metabolic Panel (2019  3:06 PM CDT)



    



              Component    Value        Ref Range    Performed At  Pathologist



                                         Signature

 

    



                 Sodium          138             133 - 147 MEQ/L  [facility] 

 

    



                 Potassium       4.6             3.5 - 5.3 MEQ/L  [facility] 

 

    



                 Chloride        103             96 - 112 MEQ/L  [facility] 

 

    



                 Carbon Dioxide  30              20 - 32 MEQ/L   [facility] 

 

    



                 Anion Gap       6               5 - 17          [facility] 

 

    



                 Calcium         9.3             8.4 - 10.5 mg/dL  [facility] 

 

    



                 Glucose         88              70 - 100 mg/dL  [facility] 

 

    



                 Protein Total   6.5             6.0 - 8.2 g/dL  [facility] 



                           Serum                     [facility] 

 

    



                 Albumin         3.7             3.5 - 5.0 g/dL  [facility] 

 

    



                 Alkaline        85              42 - 140 IU/L   [facility] 



                           Phosphatase               [facility] 



                                         [facility] 

 

    



                 Alanine         25              0 - 34 IU/L     [facility] 



                           Aminotransferas           [facility] 



                           e                         [facility]ATOR[facility] 

 

    



                 Aspartate       31              15 - 46 IU/L    [facility] 



                           Aminotransferas           [facility] 



                           e                         [facility] 

 

    



                 Bilirubin Total  0.4             0.2 - 1.3 mg/dL  [facility] 

 

    



                 Blood Urea      19              7 - 26 mg/dL    [facility] 



                           Nitrogen                  [facility] 

 

    



                 Creatinine      0.7             0.4 - 1.1 mg/dL  [facility] 

 

    



                 eGFR Female AA  96              60 - 200        [facility] 



                           mL/min/1.73sq m           [facility] 



                                         [facility] 

 

    



                 eGFR Female     80              60 - 200        [facility] 



                     Non-AA              mL/min/1.73sq m     [facility] 



                                         [facility] 











                                         Specimen

 





                                         Blood







   



                 Performing Organization  Address         City/State/Zipcode  Ph

one Number

 

   



                 [facility] [address] [place] [address]

  [phone]



                                         LABORATORIES   





* CBC and Diff (manual diff if necessary) (2019  3:06 PM CDT)



    



              Component    Value        Ref Range    Performed At  Pathologist



                                         Signature

 

    



                 WBC             6.66            4.00 - 11.00 TH/uL  [facility] 

 

    



                 RBC             4.18            4.00 - 5.00 MIL/uL  [facility] 

 

    



                 Hemoglobin      12.9            12.0 - 15.0 g/dL  [facility] 

 

    



                 Hematocrit      39              36 - 45 %       [facility] 

 

    



                 MCV             93              80 - 99 fL      [facility] 

 

    



                 MCH             31              27 - 34 pg      [facility] 

 

    



                 MCHC            33              32 - 36 %       [facility] 

 

    



                 RDW             13.3            11.5 - 14.5 %   [facility] 

 

    



                 Platelet Count  190             140 - 400 TH/uL  [facility] 

 

    



                 MPV             12.2            9.4 - 12.3 fL   [facility] 

 

    



                 Nucleated RBCs  0               0 - 0 /100      [facility] 

 

    



                 % Neutrophils   68              45 - 78 %       [facility] 

 

    



                 %Lymphocytes    17              15 - 47 %       [facility] 

 

    



                 %Monocytes      11              0 - 12 %        [facility] 

 

    



                 %Eosinophils    2               0 - 7 %         [facility] 

 

    



                 %Basophils      1               0 - 2 %         [facility] 

 

    



                 % Imm Grans     0               0 - 1 %         [facility] 

 

    



                 # Granulocytes  4.57            1.70 - 6.80 TH/uL  [facility] 



                                         [facility] 

 

    



                 # Lymphocytes   1.13            1.00 - 3.30 TH/uL  [facility] 



                                         [facility] 

 

    



                 # Monocytes     0.76            0.20 - 0.90 TH/uL  [facility] 



                                         [facility] 

 

    



                 # Eosinophils   0.16            0.00 - 0.40 TH/uL  [facility] 



                                         [facility] 

 

    



                 # Basophils     0.04            0.00 - 0.10 TH/uL  [facility] 



                                         [facility] 











                                         Specimen

 





                                         Blood







   



                 Performing Organization  Address         City/State/Zipcode  Ph

one Number

 

   



                 [facility]  [address] [place] [address]

  [phone]



                                         LABORATORIES   





documented in this encounter



Visit Diagnoses









                                         Diagnosis

 





                                         Laboratory exam ordered as part of rout

ine general medical examination



                                         Laboratory examination ordered as part 

of a routine general medical examination

 





                                         Bradycardia



                                         Other specified cardiac dysrhythmias



documented in this encounter

## 2020-03-13 NOTE — XMS REPORT
Encounter Summary

                             Created on: 2020



Alvina Charles

External Reference #: [id number]

: 1935

Sex: Female



Demographics





                          Address                   [address] [place]  [address]

 

                          Home Phone                [phone]

 

                          Preferred Language        English

 

                          Marital Status            

 

                          Nondenominational Affiliation     [id number]

 

                          Race                      White

 

                          Ethnic Group              Not  or 





Author





                          Author                    [facility]

 

                          Organization              [facility]

 

                          Address                   Unknown

 

                          Phone                     Unavailable







Support





                Name            Relationship    Address         Phone

 

                Krista Honeycutt   [employer]            Unknown         [phone]







Care Team Providers





                    Care Team Member Name Role                Phone

 

                    Payam Hutchison   PCP                 [phone]







Encounter Details





                          Care Team                 Description



                     Date                Type                Department  

 

                                        



Lori Wakefield RN                   



                     2018          Abstract            [facility]  



                                         [facility]  



                                         [facility]  



                                         [address]  



                                         [address]  



                                         [place] [address]  



                                         [phone]  







Social History





                                        Date



                 Tobacco Use     Types           Packs/Day       Years Used 

 

                                         



                                         Never Smoker    

 

    



                                         Smokeless Tobacco: Never   



                                         Used   







                    Drinks/Week         oz/Week             Comments



                                         Alcohol Use   

 

                                                             



                                         No   







 



                           Sex Assigned at Birth     Date Recorded

 

 



                                         Not on file 







                                        Industry



                           Job Start Date            Occupation 

 

                                        Not on file



                           Not on file               Not on file 







                                        Travel End



                           Travel History            Travel Start 

 





                                         No recent travel history available.



documented as of this encounter



Plan of Treatment





Not on filedocumented as of this encounter



Visit Diagnoses

Not on filedocumented in this encounter

## 2020-03-13 NOTE — XMS REPORT
Encounter Summary

                             Created on: 2020



Alvina Charles

External Reference #: [id number]

: 1935

Sex: Female



Demographics





                          Address                   [address] [place]  [address]

 

                          Home Phone                [phone]

 

                          Preferred Language        English

 

                          Marital Status            

 

                          Sabianism Affiliation     [id number]

 

                          Race                      White

 

                          Ethnic Group              Not  or 





Author





                          Author                    [facility]

 

                          Organization              [facility]

 

                          Address                   Unknown

 

                          Phone                     Unavailable







Support





                Name            Relationship    Address         Phone

 

                Krista Honeycutt   [employer]            Unknown         [phone]







Care Team Providers





                    Care Team Member Name Role                Phone

 

                    Payam Hutchison   PCP                 [phone]







Encounter Details





                          Care Team                 Description



                     Date                Type                Department  

 

                                        



Lucille Alvarado RN                     



                     2019          Abstract            [facility]  



                                         [facility]  



                                         [facility]  



                                         [address] [facility]  



                                         [address]  



                                         [place] [address]  



                                         [phone]  







Social History





                                        Date



                 Tobacco Use     Types           Packs/Day       Years Used 

 

                                         



                                         Never Smoker    

 

    



                                         Smokeless Tobacco: Never   



                                         Used   







                    Drinks/Week         oz/Week             Comments



                                         Alcohol Use   

 

                                                             



                                         No   







 



                           Sex Assigned at Birth     Date Recorded

 

 



                                         Not on file 







                                        Industry



                           Job Start Date            Occupation 

 

                                        Not on file



                           Not on file               Not on file 







                                        Travel End



                           Travel History            Travel Start 

 





                                         No recent travel history available.



documented as of this encounter



Plan of Treatment





Not on filedocumented as of this encounter



Visit Diagnoses

Not on filedocumented in this encounter

## 2020-03-13 NOTE — XMS REPORT
Encounter Summary

                             Created on: 2020



Alvina Charles

External Reference #: [id number]

: 1935

Sex: Female



Demographics





                          Address                   [address] [place]  [address]

 

                          Home Phone                [phone]

 

                          Preferred Language        English

 

                          Marital Status            

 

                          Rastafarian Affiliation     [id number]

 

                          Race                      White

 

                          Ethnic Group              Not  or 





Author





                          Author                    [facility]

 

                          Organization              [facility]

 

                          Address                   Unknown

 

                          Phone                     Unavailable







Support





                Name            Relationship    Address         Phone

 

                Krista Honeycutt   ECON            Unknown         [phone]







Care Team Providers





                    Care Team Member Name Role                Phone

 

                    Payam Hutchison   PCP                 [phone]







Encounter Details





                          Care Team                 Description



                     Date                Type                Department  

 

                                        



Sammie Arellano MD



[address]



[address]



[place] [address]



[phone] (Fax)                      Annual physical exam; 

Mitral valve insufficiency, unspecified etiology



                     2017          Lab                 [facility]

al  



                                         [address]  



                                         [address]  



                                         [place] [address]  



                                         [phone]  







Social History





                                        Date



                 Tobacco Use     Types           Packs/Day       Years Used 

 

                                         



                                         Never Smoker    

 

    



                                         Smokeless Tobacco: Never   



                                         Used   







                    Drinks/Week         oz/Week             Comments



                                         Alcohol Use   

 

                                                             



                                         No   







 



                           Sex Assigned at Birth     Date Recorded

 

 



                                         Not on file 







                                        Industry



                           Job Start Date            Occupation 

 

                                        Not on file



                           Not on file               Not on file 







                                        Travel End



                           Travel History            Travel Start 

 





                                         No recent travel history available.



documented as of this encounter



Plan of Treatment





Not on filedocumented as of this encounter



Procedures





                                        Comments



                 Procedure Name  Priority        Date/Time       Associated Diag

nosis 

 

                                        



Results for this procedure are in the results section.



                 THYROID CASCADE  Routine         2017      Annual physica

l exam 



                           4:30 PM CST               Mitral valve 



                                         insufficiency, 



                                         unspecified etiology 

 

                                        



Results for this procedure are in the results section.



                 NTPROBNP        Routine         2017      Annual physical

 exam 



                           4:30 PM CST               Mitral valve 



                                         insufficiency, 



                                         unspecified etiology 

 

                                        



Results for this procedure are in the results section.



                     IRON/TRANSFERRIN    Routine             2017  



                                         4:30 PM CST  

 

                                        



Results for this procedure are in the results section.



                     FERRITIN            Routine             2017  



                                         4:30 PM CST  

 

                                        



Results for this procedure are in the results section.



                 COMPREHENSIVE METABOLIC  Routine         2017      Annual

 physical exam 



                     PANEL               4:30 PM CST         Mitral valve 



                                         insufficiency, 



                                         unspecified etiology 

 

                                        



Results for this procedure are in the results section.



                 CBC AND DIFF (MANUAL DIFF  Routine         2017      Liz

al physical exam 



                     IF NECESSARY)       4:30 PM CST         Mitral valve 



                                         insufficiency, 



                                         unspecified etiology 



documented in this encounter



Results

* Ferritin (2017  4:30 PM CST)



    



              Component    Value        Ref Range    Performed At  Pathologist



                                         Signature

 

    



                 Ferritin        30              20 - 200 ng/mL  [facility] 











                                         Specimen

 









   



                 Performing Organization  Address         City/[facility]

one Number

 

   



                 [facility] [address] [place] [address]

  [phone]



                                         LABORATORIES   





* Iron/Transferrin (2017  4:30 PM CST)



    



              Component    Value        Ref Range    Performed At  Pathologist



                                         Signature

 

    



                 Iron            110             50 - 180 ug/dL  [facility] 

 

    



                 Transferrin     308             206 - 381 mg/dL  [facility] 

 

    



                 Total           370             204 - 408 ug/dL  [facility] 



                           Iron-Binding              REGIONAL 



                           Capacity                  LABORATORIES 

 

    



                 Iron/Transferri  30              15 - 50 %       [facility] 



                           n % Saturation            [facility] 



                                         [facility]IES 











                                         Specimen

 









   



                 Performing Organization  Address         City/State/Zipcode  Ph

one Number

 

   



                 [facility] [address] [place], [place] 

  [phone]



                                         LABORATORIES   





* NTproBNP (2017  4:30 PM CST)



    



              Component    Value        Ref Range    Performed At  Pathologist



                                         Signature

 

    



                 NTproBNP        190             pg/mL           [facility] 



                           Comment:                  REG[facility]AL 



                           NT-proBNP Reference Ranges:   LABORATORIES 



                                          <50 yr        



                                         <450 pg/mL   



                                          50-75 yr       



                                         <900 pg/mL   



                                          >75 yr        



                                         <1800 pg/mL   



                                         A cutoff value of 1200 pg/mL   



                                         is recommended in patients 50   



                                         to 70 years of age with a GFR   



                                         between 30 and 60. NT-proBNP   



                                         is unreliable in patients with   



                                         GFR <30.   











                                         Specimen

 





                                         Blood







   



                 Performing Organization  Address         City/State/[facility]

one Number

 

   



                 [facility] [address] [place] [address]

  [phone]



                                         LABORATORIES   





* Thyroid [place] (2017  4:30 PM CST)



    



              Component    Value        Ref Range    Performed At  Pathologist



                                         Signature

 

    



                 Thyroid         0.90            0.47 - 4.68 uIU/mL  [facility] 



                           Stimulating               REGIONAL 



                           Hormone                   LABORATORIES 











                                         Specimen

 





                                         Blood







   



                 Performing Organization  Address         City/State/[facility]

one Number

 

   



                 [facility] [address] [place] [address]

  [phone]



                                         LABORATORIES   





* Comprehensive Metabolic Panel (2017  4:30 PM CST)



    



              Component    Value        Ref Range    Performed At  Pathologist



                                         Signature

 

    



                 Sodium          140             133 - 147 MEQ/L  [facility] 

 

    



                 Potassium       4.6             3.5 - 5.3 MEQ/L  [facility] 

 

    



                 Chloride        101             96 - 112 MEQ/L  [facility] 

 

    



                 Carbon Dioxide  31              20 - 32 MEQ/L   [facility] 

 

    



                 Anion Gap       9               5 - 17          [facility] 

 

    



                 Calcium         9.5             8.4 - 10.5 mg/dL  [facility] 

 

    



                 Glucose         118 (H)         70 - 100 mg/dL  [facility] 

 

    



                 Protein Total   7.1             6.0 - 8.2 g/dL  [facility] 



                           Serum                     [facility] 

 

    



                 Albumin         4.1             3.5 - 5.0 g/dL  [facility] 

 

    



                 Alkaline        99              42 - 140 IU/L   [facility] 



                           Phosphatase               [facility] 

 

    



                 Alanine         14              13 - 69 IU/L    [facility] 



                           Aminotransferas           REGIONAL 



                           e                         LABORATORIES 

 

    



                 Aspartate       27              15 - 46 IU/L    [facility] 



                           Aminotransferas           REGIONAL 



                           e                         LABORATORIES 

 

    



                 Bilirubin Total  0.6             0.2 - 1.3 mg/dL  [facility] 

 

    



                 Blood Urea      17              7 - 26 mg/dL    [facility] 



                           Nitrogen                  [facility] 

 

    



                 Creatinine      0.8             0.4 - 1.1 mg/dL  [facility] 

 

    



                 eGFR Female AA  83              60 - 200        [facility] 



                           Comment:                  [facility] 



                           Chronic Kidney Disease less   LABORATORIES 



                                         than 60 mL/min/1.73 sq.m   



                                         Kidney failure less than 15   



                                         mL/min/1.73 sq.m   

 

    



                 eGFR Female     69              60 - 200        [facility] 



                     Non-AA              Comment:            REGIONAL 



                           Chronic Kidney Disease less   LABORATORIES 



                                         than 60 mL/min/1.73 sq.m   



                                         Kidney failure less than 15   



                                         mL/min/1.73 sq.m   











                                         Specimen

 





                                         Blood







   



                 Performing Organization  Address         City/State/Zipcode  Ph

one Number

 

   



                 [facility]  [address] [place] [address]

  [phone]



                                         LABORATORIES   





* CBC and Diff (manual diff if necessary) (2017  4:30 PM CST)



    



              Component    Value        Ref Range    Performed At  Pathologist



                                         Signature

 

    



                 WBC             6.63            4.00 - 11.00 TH/uL  [facility] 



                                         [facility] 

 

    



                 RBC             4.36            4.00 - 5.00 MIL/uL  [facility] 

 

    



                 Hemoglobin      13.3            12.0 - 15.0 g/dL  [facility] 

 

    



                 Hematocrit      40              36 - 45 %       [facility] 

 

    



                 MCV             92              80 - 99 fL      [facility] 

 

    



                 MCH             31              27 - 34 pg      [facility] 

 

    



                 MCHC            33              32 - 36 %       [facility] 

 

    



                 RDW             13.0            9.0 - 14.5 %    [facility] 

 

    



                 Platelet Count  214             140 - 400 TH/uL  [facility] 

 

    



                 MPV             11.7            9.4 - 12.3 fL   [facility] 

 

    



                 Nucleated RBCs  0               0 - 0 /100      [facility] 

 

    



                 % Neutrophils   60              45 - 78 %       [facility] 

 

    



                 %Lymphocytes    26              15 - 47 %       [facility] 

 

    



                 %Monocytes      12              0 - 12 %        [facility] 

 

    



                 %Eosinophils    2               0 - 7 %         [facility] 

 

    



                 %Basophils      1               0 - 2 %         [facility] 

 

    



                 % Imm Grans     0               0 - 1 %         [facility] 

 

    



                 # Granulocytes  3.96            1.70 - 6.80 TH/uL  [facility] 



                                         [facility] 

 

    



                 # Lymphocytes   1.73            1.00 - 3.30 TH/uL  [facility] 



                                         [facility] 

 

    



                 # Monocytes     0.76            0.20 - 0.90 TH/uL  [facility] 



                                         [facility] 

 

    



                 # Eosinophils   0.13            0.00 - 0.40 TH/uL  [facility] 



                                         [facility] 

 

    



                 # Basophils     0.04            0.00 - 0.10 TH/uL  [facility] 



                                         [facility] 











                                         Specimen

 





                                         Blood







   



                 Performing Organization  Address         City/State/Zipcode  Ph

one Number

 

   



                 [facility] [address] [place] [address]

  [phone]



                                         LABORATORIES   





documented in this encounter



Visit Diagnoses









                                         Diagnosis

 





                                         Annual physical exam



                                         Routine general medical examination at 

a health care facility

 





                                         Mitral valve insufficiency, unspecified

 etiology



documented in this encounter

## 2020-03-13 NOTE — XMS REPORT
Encounter Summary

                             Created on: 2020



Alvina Charles

External Reference #: [id number]

: 1935

Sex: Female



Demographics





                          Address                   [address] [place]  [address]

 

                          Home Phone                [phone]

 

                          Preferred Language        English

 

                          Marital Status            

 

                          Scientology Affiliation     [id number]

 

                          Race                      White

 

                          Ethnic Group              Not  or 





Author





                          Author                    [facility]

 

                          Organization              [facility]

 

                          Address                   Unknown

 

                          Phone                     Unavailable







Support





                Name            Relationship    Address         Phone

 

                Krista Honeycutt   [employer]            Unknown         [phone]







Care Team Providers





                    Care Team Member Name Role                Phone

 

                    Payam Hutchison   PCP                 [phone]







Encounter Details





                          Care Team                 Description



                     Date                Type                Department  

 

                                        



Sammie Arellano MD



[address]



[address]



[place] [address]



[phone] (Fax)                       



                     2017          Documentation       [facility]  



                                         [facility]  



                                         [facility]  



                                         [address]  



                                         [address]   



                                         [place] [address]  



                                         [phone]  







Social History





                                        Date



                 Tobacco Use     Types           Packs/Day       Years Used 

 

                                         



                                         Never Smoker    

 

    



                                         Smokeless Tobacco: Never   



                                         Used   







                    Drinks/Week         oz/Week             Comments



                                         Alcohol Use   

 

                                                             



                                         No   







 



                           Sex Assigned at Birth     Date Recorded

 

 



                                         Not on file 







                                        Industry



                           Job Start Date            Occupation 

 

                                        Not on file



                           Not on file               Not on file 







                                        Travel End



                           Travel History            Travel Start 

 





                                         No recent travel history available.



documented as of this encounter



Plan of Treatment





Not on filedocumented as of this encounter



Visit Diagnoses

Not on filedocumented in this encounter

## 2020-03-13 NOTE — XMS REPORT
Encounter Summary

                             Created on: 2020



IraidabipinAlvina snellTuyet

External Reference #: [id number]

: 1935

Sex: Female



Demographics





                          Address                   [address] [place]  [address]

 

                          Home Phone                [phone]

 

                          Preferred Language        English

 

                          Marital Status            

 

                          Islam Affiliation     [id number]

 

                          Race                      White

 

                          Ethnic Group              Not  or 





Author





                          Author                    [facility]

 

                          Organization              [facility]

 

                          Address                   Unknown

 

                          Phone                     Unavailable







Support





                Name            Relationship    Address         Phone

 

                Krista Honeycutt   [employer]            Unknown         [phone]







Care Team Providers





                    Care Team Member Name Role                Phone

 

                    Payam Hutchison   PCP                 [phone]







Encounter Details





                          Care Team                 Description



                     Date                Type                Department  

 

                                        



Jaida Myers RN                    



                     2019          Telephone           [facility]  



                                         [facility]  



                                         [facility]  



                                         [address]  



                                         [address]  



                                         [place] [address]  



                                         [phone]  







Social History





                                        Date



                 Tobacco Use     Types           Packs/Day       Years Used 

 

                                         



                                         Never Smoker    

 

    



                                         Smokeless Tobacco: Never   



                                         Used   







                    Drinks/Week         oz/Week             Comments



                                         Alcohol Use   

 

                                                             



                                         No   







 



                           Sex Assigned at Birth     Date Recorded

 

 



                                         Not on file 







                                        Industry



                           Job Start Date            Occupation 

 

                                        Not on file



                           Not on file               Not on file 







                                        Travel End



                           Travel History            Travel Start 

 





                                         No recent travel history available.



documented as of this encounter



Miscellaneous Notes

* Telephone Encounter - Tenisha Cardona RN - 2019  4:40 PM CDT



Returned call to pts daughter and informed her of Dr. Patrick mitchell. She v/u and 
states that they are following up with pts PCP regarding her thyroid. I informed
her if once her thyroid is treated if she continues to have symptoms to call us 
back. She v/u.



Electronically signed by Tenisha Cardona RN at 2019  4:45 PM CDT

* Telephone Encounter - Mariam Nguyen MD - 2019  4:15 PM CDT



Ok to dc and defer monitor.

Suspect her sxs in part related to abnormal thyroid noted on recent blood test; 
please verify is following up with PCP



Electronically signed by Mariam Nguyen MD at 2019  4:16 PM CDT

* Telephone Encounter - Jaida Myers RN - 2019  1:51 PM CDT



Formatting of this note might be different from the original.

Returned call to patient's dtrDeontea, who reports that she just found out patiedna
t's zio patch only lasted about one day and she has not been wearing since. Zoila
sed her to call the customer service number on the patch box/info to let them kn
ow it didn't stick and so mailing back in early. Discussed possibly trying [facility]
er heart monitor/event recorder, and dtr expressed concerns of pt being able to 
keep on. She states that she has been battling with some type of dementia, thoug
h the patient refuses to believe it, and has a hard time remembering to leave th
ings on and alone. Will pass along to  for recs.



Pt dtr would like CV MRI scheduling to call her to schedule appt as patient is f
orgetful and dtr is transportation. Note placed on appt for scheduling order wit
hielaine [facility].



Gave results and recs per Dr. Nguyen of recent lab testing.



Mariam Nguyen MD  P [facility] - [facility] 

 

 

 

Please run thyroid cascade off of blood in lab; may be hyperthyroid which may be
culprit of sxs  



Called [facility], spoke with Uriel, and able to add on thyroid cascade to labs. Emigdio gonzalez.



Electronically signed by Jaida Myers RN at 2019  2:06 PM CDT

documented in this encounter



Plan of Treatment





Not on filedocumented as of this encounter



Visit Diagnoses









                                         Diagnosis

 





                                         Low TSH level

 





                                         Hyperthyroidism



                                         Thyrotoxicosis without mention of goite

r or other cause, without mention of 

thyrotoxic crisis or



                                         storm



documented in this encounter

## 2020-03-13 NOTE — XMS REPORT
Encounter Summary

                             Created on: 2020



Alvina Bansal

External Reference #: [id number]

: 1935

Sex: Female



Demographics





                          Address                   [address] [place]  [address]

 

                          Home Phone                [phone]

 

                          Preferred Language        English

 

                          Marital Status            

 

                          Taoist Affiliation     [id number]

 

                          Race                      White

 

                          Ethnic Group              Not  or 





Author





                          Author                    [facility]

 

                          Organization              [facility]

 

                          Address                   Unknown

 

                          Phone                     Unavailable







Support





                Name            Relationship    Address         Phone

 

                Krista Honeycutt   ECON            Unknown         [phone]







Care Team Providers





                    Care Team Member Name Role                Phone

 

                    Payam Hutchison   PCP                 [phone]







Reason for Referral

* Diagnostic Imaging (Routine)



                          Referred By Contact       Referred To Contact



                 Status          Reason          Specialty       Diagnoses /  



                                         Procedures  

 

                                        



Yousif Hernandez MD



[address]



[address]



[place] [address]



Phone: [phone]



Fax: [phone]                       







                           Closed                    Diagnoses  



                                         Chronic diastolic  



                                         (congestive) heart  



                                         failure (HCC)

  



                                         P  



                                         rocedures  



                                         Electrocardiogram  



                                         (ECG)  











Reason for Visit

* 



 



                           Reason                    Comments

 

 



                                         Hypertension 









Encounter Details





                          Care Team                 Description



                     Date                Type                Department  

 

                                        



Yousif Hernandez MD



[address]



[address]



[place] [address] [phone] (Fax)                      Chronic diastolic (congestive) heart lamont

lure (HCC) (Primary 

Dx); 

Mitral valve insufficiency, unspecified etiology; 

Pulmonary hypertension (HCC); 

Hypothyroidism, unspecified type



                     2017          Office Visit        [facility]  



                                         [address] [place] [address]  



                                         [address]  



                                         [place] [address]  



                                         [phone]  







Social History





                                        Date



                 Tobacco Use     Types           Packs/Day       Years Used 

 

                                         



                                         Never Smoker    

 

    



                                         Smokeless Tobacco: Never   



                                         Used   







                    Drinks/Week         oz/Week             Comments



                                         Alcohol Use   

 

                                                             



                                         No   







 



                           Sex Assigned at Birth     Date Recorded

 

 



                                         Not on file 







                                        Industry



                           Job Start Date            Occupation 

 

                                        Not on file



                           Not on file               Not on file 







                                        Travel End



                           Travel History            Travel Start 

 





                                         No recent travel history available.



documented as of this encounter



Last Filed Vital Signs





                    Reading             Time Taken          Comments



                                         Vital Sign   

 

                    126/72              2017  3:09 PM CST  



                                         Blood Pressure   

 

                    74                  2017  3:09 PM CST reg



                                         Pulse   

 

                    -                   -                    



                                         Temperature   

 

                    -                   -                    



                                         Respiratory Rate   

 

                    -                   -                    



                                         Oxygen Saturation   

 

                    -                   -                    



                                         Inhaled Oxygen   



                                         Concentration   

 

                    62.1 kg (137 lb)    2017  3:09 PM CST  



                                         Weight   

 

                    157.5 cm (5' 2")    2017  3:09 PM CST  



                                         Height   

 

                    25.06               2017  3:09 PM CST  



                                         Body Mass Index   



documented in this encounter



Patient Instructions

* Patient Instructions* 



 Cydney New, RN - 2017  3:44 PM CST



Formatting of this note might be different from the original.

"We want to know how we are doing taking care of our patients.  You may receive 
an on-line e-mail with a survey asking you how we did.  We strive for 10's and V
sascha Satisfied patients.  We would appreciate your time to fill out the survey fo
r us.  Thank you for helping us be the very best we can be."



1.  Dr Heranndez spoke with your about elevated pressures in your heart and how thi
s can make your heart work harder, thus making you more short of breath or have 
chest aches/pains with activity. This is probably caused primarily by the MITRAL
VALVE (leaking/regurgitation)



2.  To check the pressures of the heart and valve, Have ordered an SIXTO (TRANSESO
PHAGEAL ECHO), they will contact you for an appointment.  If you have not heard 
from them in 10 business days, please call [phone]



3.  Have ordered labs that need to be done: THYROID CASCADE, CBC, CMP, IRON STUD
IES, BNP.  PLEASE HAVE DONE TODAY, [facility] [address], [address].    



4.  Dr Hernandez will review the lab results and her nurses should call you with an
y recommendations.  If you have not heard from them in 3-5 days AFTER your get y
our blood drawn, please call [phone]



5.  To help with pressures in your heart, Dr Hernandez would like to start you on a
"mild water pill/diuretic"   FUROSEMIDE 20 MG, TAKE HALF A TABLET EVERYDAY.  She
may decide you need a potassium pill, but only AFTER she is able to review your 
labs.  Her nurses will contact you with her recommendations.



6.  Please weigh yourself everyday and keep a record.  Call if you gain 2#/24 ho
urs, 3#/48 hours or 5#/week or have increased shortness of air, swelling/edema t
o feet and anklees or bloating in abdomen--[phone]



7.  Follow up with Dr Hernandez in ONE MONTH, we should be able to make this appoin
tment today.



Understanding Mitral Valve Regurgitation



Mitral valve regurgitation is when the mitral valve in the heart is leaky. It le
ts some blood flow backwards when the ventricle squeezes.

How mitral valve regurgitation happens

The mitral valve is one of the hearts 4 valves. Normally, these valves help t
he blood flow through the hearts 4 chambers and out to the body without leaki
ng backwards. The mitral valve lies between the left atrium and the left ventric
le. Normally, the mitral valve stops blood from flowing back into the left atriu
m from the left ventricle when the ventricle squeezes. This maximizes forward bl
ood flow through the heart.

With mitral valve regurgitation, some blood leaks back through the valve. This m
akes the heart have to work harder to get blood out to your body. When this bloo
d is regurgitated backwards in the heart, it increases the pressure within the h
eart which can lead to muscle damage as well as high blood pressure in the lungs
.

Mitral valve regurgitation can increase risk for other heart rhythm problems, johnson
ch as atrial fibrillation (AFib). This abnormal heart rhythm results in fast and
irregular heartbeats which can also lower the heart's pumping ability and incre
ases the risk for stroke.

Mitral valve regurgitation can be acute or chronic. If its acute, the valve s
uddenly becomes leaky. In this case, the heart doesnt have time to adapt to t
he leak in the valve. Symptoms are often severe. If its chronic, the valve le
aks more and more over time. The heart has time to adapt to the leak.

What causes mitral valve regurgitation?

Mitral valve regurgitation can be caused by various things, such as:

 Heart attack or coronary artery disease

 Natural aging that can damage the mitral valve

 Tearing of the tissue or muscle that supports the mitral valve such as due to
an injury

 A redundant or floppy mitral valve, called mitral valve prolapse

 Rheumatic heart disease caused by untreated Streptococcus infection. Strep ar
e the bacteria that cause strep throat.

 Certain autoimmune diseases, such as rheumatoid arthritis

 Infection of the heart valves (endocarditis)

 Problems with the mitral valve that are present at birth

 Certain medicines

You can reduce some risk factors for mitral valve regurgitation. For example:

 Use antibiotics as directed to treat a strep infection and prevent rheumatic 
heart disease.

 Reduce the risk for heart valve infection by not injecting illegal drugs.

 Manage risk factors that can lead to a heart attack or chronic heart artery d
isease.

There are other risk factors that you cant change. For example, some conditio
ns that can lead to mitral valve regurgitation are partly genetic.

Symptoms of mitral valve regurgitation

Chronic mitral valve regurgitation often doesnt cause symptoms for a long luma
e. Mild or moderate mitral regurgitation often doesnt cause any symptoms. If 
the condition becomes more severe, you may have symptoms. They may get worse and
happen more often over time. Symptoms may include:

 Shortness of breath with physical activity

 Shortness of breath when lying flat

 Feeling tired

 Less ability to exercise

 Awareness of your heartbeat

 Swelling in your legs, abdomen, and the veins in your neck

 Chest pain (less common)

Acute, severe mitral valve regurgitation is a medical emergency that can cause s
erious symptoms such as:

 Symptoms of shock (pale skin, loss of consciousness, rapid breathing)

 Severe shortness of breath

 Arrhythmias that make the heart unable to pump blood well

Diagnosing mitral valve regurgitation

Your healthcare provider will ask about your health history. He or she will give
you a physical exam. Using a stethoscope, your healthcare provider will listen 
to your heart. This is to check for sounds called heart murmurs and other signs 
that the heart isnt pumping normally. You may also have tests such as:

 Echocardiogram, to look at the structure of the heart using sound waves

 Stress echocardiogram, to see how well the heart does during exercise

 Electrocardiogram (EKG), to check the hearts rhythm

 Cardiac MRI, transesophageal echocardiogram, or cardiac catheterization, if m
ore information is needed

 9077-8880 [employer]. [address] [place] [address] 6794
7. All rights reserved. This information is not intended as a substitute for pro
fessional medical care. Always follow your healthcare professional's instruction
s.



Transesophageal Echocardiography (SIXTO)

 

A probe in your esophagus produces sound waves.  



Transesophageal echocardiography (SIXTO) is a test that allows your doctor to annabella
rd images of your heart from inside your esophagus, or food pipe. These images h
elp your doctor identify and treat problems such as infection, disease, or defec
ts in your hearts walls or valves. Allow about 1 to 2 hours after the test is
completed to the time you can leave.

Before your test

 Mention allthe medications you take and ask if itsOK to take them befo
re the test.

 Dont eat or drink for6 [id number] before the test. This includes water
.

 Tell your doctor if you have ulcers, a hiatal hernia, or problems swallowing.
Also, let him or her know of any allergies to any medications or sedatives.

 Arrange to have someonedrive youhome after the exam.

During your SIXTO

 When you arrive for your SIXTO, you will change into a hospital gown, and then 
be taken to the testing room.

 Your throat is sprayed with an anesthetic to numb it. You may be given a mild
sedative through an IV (intravenous) line in your arm to help you relax. You may
also be given oxygen. Then youll be asked to lie on your left side.

 The doctor gently inserts the probe into your mouth. As you swallow, the tube
is slowly guided into your esophagus. The tube is lubricated to make it slide e
asily.

 You may feel the doctor moving the probe, but it shouldnt hurt or interfer
e with your breathing. A nurse monitors your heart rate, blood pressure, and santana
athing.The test usually takes 20 to 40 minutes.

After the test

 You can eatand drink again when your throat is no longer numb.

 Be sure to keep your follow-up appointment.

 Your next appointment is: ____________________

 [id number] [employer]. [address] [place] [address]
7. All rights reserved. This information is not intended as a substitute for pro
fessional medical care. Always follow your healthcare professional's instruction
s.







Electronically signed by Cydney New RN at 2017  4:07 PM CST





documented in this encounter



Progress Notes

* Yousif Hernandez MD - 2017  3:20 PM CST



Formatting of this note might be different from the original.



    [facility]



Appointment Date: 3/7/2017



Payam Hutchison DO

[address] [place] [id number]



RE:  Alvina Bansal

:   1935  

Visit provider:  Yousif Hernandez MD



Dear Payam Hutchison DO,



I had the pleasure of seeing Alvina Bansal in the office today. She is an 81 y.o. fe
male and presents with the following chief complaints: Dyspnea



HPI:  

I enjoyed meeting Ms. Bansal today to discuss her persistent dyspnea and chest p
ain.  As you may recall, she is a very pleasant 81-year-old woman who has had se
veral years of progressive dyspnea and was found to have diastolic dysfunction a
nd elevated estimated pulmonary pressures by echocardiography last year.  She no
rima a long history of mitral valve prolapse, but was subsequently found during a
hospitalization late last year to have significant at least moderate mitral reg
urgitation, elevated estimated pulmonary pressures, mildly elevated right and le
ft-sided pressures and a marginal cardiac index of 2.1 with preserved LV functio
n and mild to moderate tricuspid regurgitation.  She was placed on medical thera
py that at times has included losartan, nitrate and amlodipine with symptomatic 
hypotension and intolerance.  She was at one point on spironolactone as well but
does not recall it being of particular benefit.  She will get short of breath a
nd chest aching with any exertion.  She has mild edema but does feel does feel m
ore bothered by early satiety and abdominal bloating.  She did have invasive cor
onary angiography in the setting of a mildly elevated troponin during the time o
f her catheterization her hospitalization, which did not suggest any coronary di
sease of note.  She also had pulmonary function testing that showed diminished D
LCO with mild restrictive pattern but no significant COPD, which had been a conc
daron as well.



 

Patient Active Problem List 

Diagnosis SNOMED CT(R) 

 Hypothyroidism HYPOTHYROIDISM 

 Fatigue FATIGUE 

 Exertional dyspnea DYSPNEA ON EXERTION 

 Pulmonary hypertension (HCC) PULMONARY HYPERTENSION 

 Multiple sclerosis (HCC) MULTIPLE SCLEROSIS 

 Mitral regurgitation MITRAL VALVE REGURGITATION 



Past Medical History 

Diagnosis Date 

 Chest pain  

 Edema  

  Lower ext 

 Exertional dyspnea  

 Family history of coronary artery disease  

 Fatigue  

 Hypothyroidism  

 Multiple sclerosis (HCC)  

 Pulmonary hypertension (HCC) 2015 

  Moderate per Echo 



Past Surgical History 

Procedure Laterality Date 

 Cardiac catheterization  2016 

  No angiographic evidence of coronary artery disease. ([facility]) 

 Right heart cath  2016 

  RA: 7. RV: 41/9. PA: 38/11/20. Wedge: 15 with V-waves to 25. LV: 143/13, Ascen
ding aortic pressure: 145/61. CO/CI (Sanna): 3.6/2.1. LVEF 55%. ([facility]) 

 Appendectomy   

 Hysterectomy   

 Tonsillectomy   



Final Medications:

Current Outpatient Prescriptions 

Medication Sig Dispense Refill 

 furosemide (LASIX) 20 MG tablet Take 0.5 tablets (10 mg total) by mouth paz
y. 90 tablet 3 

 thyroid, pork, (ARMOUR) 60 mg Tab tablet Take 60 mg by mouth daily.   



No current facility-administered medications for this visit.  



Allergies 

Allergen Reactions 

 Edgard-1 Anaphylaxis 

  All MIRANDA--lidocaine, Novocain 

 Lasix [Furosemide] Anaphylaxis 

 Naltrexone Analogues  



Family History 

Problem Relation Age of Onset 

 Heart attack Father  

 Kidney cancer Brother  

 Melanoma Brother  



Social History:

Social History 

Substance Use Topics 

 Smoking status: Never Smoker 

 Smokeless tobacco: Never Used 

 Alcohol use No 



Exercise level: None



Diet: Regular

Caffeine: No



Review of Systems 

Constitution: Negative for fever, malaise/fatigue and night sweats. 

HENT: Negative for nosebleeds.  

Eyes: Negative.  

Cardiovascular: Positive for dyspnea on exertion, irregular heartbeat and leg sw
elling. Negative for chest pain, claudication, cyanosis, near-syncope, orthopnea
, palpitations and syncope. 

Respiratory: Positive for shortness of breath. Negative for cough, hemoptysis, s
leep disturbances due to breathing, snoring and wheezing.  

Endocrine: Negative for cold intolerance and polydipsia. 

Hematologic/Lymphatic: Does not bruise/bleed easily. 

Skin: Negative for rash. 

Musculoskeletal: Negative for joint pain and myalgias. 

Gastrointestinal: Negative for dysphagia, hematochezia, nausea and vomiting. 

Genitourinary: Negative for hematuria. 

Neurological: Positive for paresthesias. Negative for brief paralysis, disturban
castillo in coordination, excessive daytime sleepiness, dizziness, focal weakness, li
ght-headedness, loss of balance and numbness. 

Psychiatric/Behavioral: Negative for depression. 

All other systems reviewed and are negative.



Vital Signs 

 17 1509 

BP: 126/72 

Pulse: 74 

Weight: 62.1 kg (137 lb) 

Height: 1.575 m (5' 2") 

 

BMI: Body mass index is 25.06 kg/(m^2).

Physical Exam 

Constitutional: She appears well-developed and well-nourished. 

HENT: 

Head: Normocephalic. 

Eyes: Conjunctivae are normal. No scleral icterus. 

Neck: JVD present. 

Cardiovascular: Normal rate.  

Murmur heard.

Pulses:

     Carotid pulses are 2+ on the right side, and 2+ on the left side.

     Radial pulses are 2+ on the right side, and 2+ on the left side. 

No bruit 

Pulmonary/Chest: Effort normal and breath sounds normal. 

Abdominal: Soft. There is no tenderness. 

Musculoskeletal: She exhibits no edema or tenderness. 

Neurological: She is alert. 

No aphasia, Moves all extremities 

Skin: Skin is warm and dry. 

Psychiatric: She has a normal mood and affect. 



Cholesterol (no units) 

Date Value 

2016 158 



Triglycerides (mg/dL) 

Date Value 

2016 80 



LDL Cholesterol 

Date/Time Value Ref Range Status 

2016 102 mg/dL Final 



EKG: Reviewed



Encounter Diagnoses 

Name Primary? 

 Chronic diastolic (congestive) heart failure (HCC) Yes 

 Mitral valve insufficiency, unspecified etiology  

 Pulmonary hypertension (HCC)  

 Hypothyroidism, unspecified type  



Impression and Plan:

1.  Chronic heart failure with preserved ejection fraction in the setting of catarina
vular disease.

a.  New York Heart Association functional class IIIB.

b.  American College of Cardiology stage C.

c.  Hypervolemic on exam.

d.  Last ejection fraction of 55%.

e.  Mild pulmonary hypertension.

-- I have asked her to do a trial of low-dose diuretic therapy with furosemide 1
0 mg daily.  We will assess laboratories today to include a renal panel, BMP, CB
C, iron studies and thyroid cascade before making any additional medical therapy
 recommendations, which may include potassium supplementation or low-dose spiron
olactone therapy.

-- I have asked her to undergo transesophageal echocardiography to further evalu
ate the etiology and degree of severity of her mitral valve disease and follow u
p thereafter.

2.  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
rdial infarction with normal coronary arteries, chest pain that may be noncardia
c in nature or associated with, given her history of multiple sclerosis and by h
er reports in the past, some autoimmune disease versus associated with her heart
 failure.  She has not tolerated antianginal therapy to include calcium channel 
blockers and nitrates.  We will assess her response to diuretic therapy.



Treatment goals, progress and next steps, as above, were discussed and mutually 
agreed upon with the patient/family.  



Thank you for allowing me to participate in Alvina Bansal's care.  If I can be of an
y further assistance, please do not hesitate to contact me.



Sincerely,



Yousif Hernandez MD 



BATIFFANIE/mac





Electronically signed by Yousif Hernandez MD at 2017  4:38 PM CDT

documented in this encounter



Plan of Treatment





Not on filedocumented as of this encounter



Procedures





                                        Comments



                 Procedure Name  Priority        Date/Time       Associated Diag

nosis 

 

                                        



Results for this procedure are in the results section.



                 ECG             Routine         2017      Chronic diastol

ic 



                           3:13 PM CST               (congestive) heart 



                                         failure (HCC) 



documented in this encounter



Results

* NTproBNP (2017  4:30 PM CST)



    



              Component    Value        Ref Range    Performed At  Pathologist



                                         Signature

 

    



                 NTproBNP        190             pg/mL           [facility] 



                           Comment:                  REGIONAL 



                           NT-proBNP Reference Ranges:   LABORATORIES 



                                          <50 yr        



                                         <450 pg/mL   



                                          50-75 yr       



                                         <900 pg/mL   



                                          >75 yr        



                                         <1800 pg/mL   



                                         A cutoff value of 1200 pg/mL   



                                         is recommended in patients 50   



                                         to 70 years of age with a GFR   



                                         between 30 and 60. NT-proBNP   



                                         is unreliable in patients with   



                                         GFR <30.   











                                         Specimen

 





                                         Blood







   



                 Performing Organization  Address         City/[facility]

one Number

 

   



                 [facility] [address] [place] [address]

  [phone]



                                         LABORATORIES   





* Thyroid Walden (2017  4:30 PM CST)



    



              Component    Value        Ref Range    Performed At  Pathologist



                                         Signature

 

    



                 Thyroid         0.90            0.47 - 4.68 uIU/mL  [facility] 



                           Stimulating               [facility] 



                           Hormone                   LABORATORIES 











                                         Specimen

 





                                         Blood







   



                 Performing Organization  Address         City/[facility]

one Number

 

   



                 [facility] [address] [place] [address]

  [phone]



                                         LABORATORIES   





* Comprehensive Metabolic Panel (2017  4:30 PM CST)



    



              Component    Value        Ref Range    Performed At  Pathologist



                                         [facility]

 

    



                 Sodium          140             133 - 147 MEQ/L  [facility] 

 

    



                 Potassium       4.6             3.5 - 5.3 MEQ/L  [facility] 

 

    



                 Chloride        101             96 - 112 MEQ/L  [facility] 

 

    



                 Carbon Dioxide  31              20 - 32 MEQ/L   [facility] 

 

    



                 Anion Gap       9               5 - 17          [facility] 

 

    



                 Calcium         9.5             8.4 - 10.5 mg/dL  [facility] 

 

    



                 Glucose         118 (H)         70 - 100 mg/dL  [facility] 

 

    



                 Protein Total   7.1             6.0 - 8.2 g/dL  [facility] 



                           Serum                     [facility] 

 

    



                 Albumin         4.1             3.5 - 5.0 g/dL  [facility] 

 

    



                 Alkaline        99              42 - 140 IU/L   [facility] 



                           Phosphatase               [facility]AL 



                                         LABORATORIES 

 

    



                 Alanine         14              13 - 69 IU/L    [facility] 



                           Aminotransferas           [facility]AL 



                           e                         LABORATORIES 

 

    



                 Aspartate       27              15 - 46 IU/L    [facility] 



                           Aminotransferas           [facility]IONAL 



                           e                         LABORATORIES 

 

    



                 Bilirubin Total  0.6             0.2 - 1.3 mg/dL  [facility] 

 

    



                 Blood Urea      17              7 - 26 mg/dL    [facility] 

 

    



                 Creatinine      0.8             0.4 - 1.1 mg/dL  [facility] 

 

    



                 eGFR Female AA  83              60 - 200        [facility] 



                           Comment:                  [facility] 



                           Chronic Kidney Disease less   LABORATORIES 



                                         than 60 mL/min/1.73 sq.m   



                                         Kidney failure less than 15   



                                         mL/min/1.73 sq.m   

 

    



                 eGFR Female     69              60 - 200        [facility] 



                     Non-AA              Comment:            REGIONAL 



                           Chronic Kidney Disease less   LABORATORIES 



                                         than 60 mL/min/1.73 sq.m   



                                         Kidney failure less than 15   



                                         mL/min/1.73 sq.m   











                                         Specimen

 





                                         Blood







   



                 Performing Organization  Address         City/State/Zipcode  Ph

one Number

 

   



                 [facility] [address] [place], [place] [id number] [phone]



                                         LABORATORIES   





* CBC and Diff (manual diff if necessary) (2017  4:30 PM CST)



    



              Component    Value        Ref Range    Performed At  Pathologist



                                         Signature

 

    



                 WBC             6.63            4.00 - 11.00 TH/uL  [facility] 

 

    



                 RBC             4.36            4.00 - 5.00 MIL/uL  [facility] 

 

    



                 Hemoglobin      13.3            12.0 - 15.0 g/dL  [facility] 

 

    



                 Hematocrit      40              36 - 45 %       [facility] 

 

    



                 MCV             92              80 - 99 fL      [facility] 

 

    



                 MCH             31              27 - 34 pg      [facility] 

 

    



                 MCHC            33              32 - 36 %       [facility] 

 

    



                 RDW             13.0            9.0 - 14.5 %    [facility] 

 

    



                 Platelet Count  214             140 - 400 TH/uL  [facility] 

 

    



                 MPV             11.7            9.4 - 12.3 fL   [facility] 

 

    



                 Nucleated RBCs  0               0 - 0 /100      [facility] 

 

    



                 % Neutrophils   60              45 - 78 %       [facility] 

 

    



                 %Lymphocytes    26              15 - 47 %       [facility] 

 

    



                 %Monocytes      12              0 - 12 %        [facility] 

 

    



                 %Eosinophils    2               0 - 7 %         [facility] 



                                         [facility] 

 

    



                 %Basophils      1               0 - 2 %         [facility] 



                                         [facility] 

 

    



                 % Imm Grans     0               0 - 1 %         [facility] 



                                         [facility] 

 

    



                 # Granulocytes  3.96            1.70 - 6.80 TH/uL  [facility] 



                                         [facility] 

 

    



                 # Lymphocytes   1.73            1.00 - 3.30 TH/uL  [facility] 



                                         [facility] 

 

    



                 # Monocytes     0.76            0.20 - 0.90 TH/uL  [facility] 



                                         [facility] 

 

    



                 # Eosinophils   0.13            0.00 - 0.40 TH/uL  [facility] 



                                         [facility] 

 

    



                 # Basophils     0.04            0.00 - 0.10 TH/uL  [facility] 



                                         [facility] 











                                         Specimen

 





                                         Blood







   



                 Performing Organization  Address         City/State/Zipcode  Ph

one Number

 

   



                 [facility] [address] [place] [address]

  [phone]



                                         LABORATORIES   





* Electrocardiogram (ECG) (2017  3:13 PM CST)







                                         Specimen

 









 



                           Narrative                 Performed At

 

 



                           This result has an attachment that is not available. 

 [facility]



                                                 [facility]

[facility] 



                                                      

      



                                                      

      Test Date:  



                                         2017 



                                         Pat Name:   ALVINA BANSAL     

   Department:  [facility] 



                                         Patient ID:  [id number]      

   Room:     



                                         Gender:    Female      

    Technician:  [id number] 



                                         :     1935    

    Requested By: YOUSIF HERNANDEZ

 



                                         Order Number: [id number]      

  Reading MD:  Yousif Hernandez 



                                                      

   Measurements 



                                         Intervals          

     Axis      



                                         Rate:     74       

     P:      64 



                                         WY:      180      

     QRS:     10 



                                         QRSD:     84       

     T:      61 



                                         QT:      384      

             



                                         QTc:     426      

             



                                                      

Interpretive Statements 



                                         SINUS RHYTHM 



                                         Electronically Signed On 3-7-2017 18:06

:17 CST by Yousif Hernandez 







                                        Procedure Note

 

                                        



Interface, External Ris In - 2017  6:06 PM CST



                 [facility]

                                       

                                       Test Date:    2017

Pat Name:     ALVIAN BANSAL                Department:   [facility]

Patient ID:   [id number]                 Room:         

Gender:       Female                   Technician:   [id number]

:          1935               Requested By: YOUSIF HERNANDEZ 

Order Number: [id number]                Reading MD:   Yousif Hernandez

                                 Measurements

Intervals                              Axis          

Rate:         74                       P:            64

WY:           180                      QRS:          10

QRSD:         84                       T:            61

QT:           384                                    

QTc:          426                                    

                           Interpretive Statements

SINUS RHYTHM





Electronically Signed On 3-7-2017 18:06:17 CST by Yousif Hernandez







   



                 Performing Organization  Address         City/State/Zipcode  Ph

one Number

 

   



                                         [facility]   





documented in this encounter



Visit Diagnoses









                                         Diagnosis

 





                                         Chronic diastolic (congestive) heart fa

ilure (HCC)

 





                                         Mitral valve insufficiency, unspecified

 etiology

 





                                         Pulmonary hypertension (HCC)



                                         Other chronic pulmonary heart diseases

 





                                         Hypothyroidism, unspecified type



documented in this encounter Jessica

## 2020-03-13 NOTE — XMS REPORT
Encounter Summary

                             Created on: 2020



Alvina Charles

External Reference #: [id number]

: 1935

Sex: Female



Demographics





                          Address                   [address] [place]  [address]

 

                          Home Phone                [phone]

 

                          Preferred Language        English

 

                          Marital Status            

 

                          Yazidism Affiliation     1013

 

                          Race                      White

 

                          Ethnic Group              Not  or 





Author





                          Author                    [facility]

 

                          Organization              [facility]

 

                          Address                   Unknown

 

                          Phone                     Unavailable







Support





                Name            Relationship    Address         Phone

 

                Krista Honeycutt   [employer]            Unknown         [phone]







Care Team Providers





                    Care Team Member Name Role                Phone

 

                    Payam Hutchison   PCP                 [phone]







Encounter Details





                          Care Team                 Description



                     Date                Type                Department  

 

                                        



Mariam Nguyen MD



[address] 



[place] [address] [phone] (Fax)                       



                     2019          Documentation       [facility]  



                                         [facility]  



                                         [facility]  



                                         [address] [place] [address]  



                                         [address]  



                                         [place] [address] [phone]  







Social History





                                        Date



                 Tobacco Use     Types           Packs/Day       Years Used 

 

                                         



                                         Never Smoker    

 

    



                                         Smokeless Tobacco: Never   



                                         Used   







                    Drinks/Week         oz/Week             Comments



                                         Alcohol Use   

 

                                                             



                                         No   







 



                           Sex Assigned at Birth     Date Recorded

 

 



                                         Not on file 







                                        Industry



                           Job Start Date            Occupation 

 

                                        Not on file



                           Not on file               Not on file 







                                        Travel End



                           Travel History            Travel Start 

 





                                         No recent travel history available.



documented as of this encounter



Plan of Treatment





Not on filedocumented as of this encounter



Visit Diagnoses

Not on filedocumented in this encounter

## 2020-03-13 NOTE — XMS REPORT
Encounter Summary

                             Created on: 2020



IraidabipinAlvina snellTuyet

External Reference #: [id number]

: 1935

Sex: Female



Demographics





                          Address                   [address] [place]  [address]

 

                          Home Phone                [phone]

 

                          Preferred Language        English

 

                          Marital Status            

 

                          Rastafari Affiliation     [id number]

 

                          Race                      White

 

                          Ethnic Group              Not  or 





Author





                          Author                    [facility]

 

                          Organization              [facility]

 

                          Address                   Unknown

 

                          Phone                     Unavailable







Support





                Name            Relationship    Address         Phone

 

                Krista Honeycutt   [employer]            Unknown         [phone]







Care Team Providers





                    Care Team Member Name Role                Phone

 

                    Payam Hutchison   PCP                 [phone]







Reason for Visit

* 



 



                           Reason                    Comments

 

 



                                         Appointment 









Encounter Details





                          Care Team                 Description



                     Date                Type                Department  

 

                                        



El Nelson, SALLY                         Appointment



                     2019          Telephone           [facility]  



                                         [facility]  



                                         [facility]  



                                         [address]  



                                         [address]  



                                         [place] [address]  



                                         [phone]  







Social History





                                        Date



                 Tobacco Use     Types           Packs/Day       Years Used 

 

                                         



                                         Never Smoker    

 

    



                                         Smokeless Tobacco: Never   



                                         Used   







                    Drinks/Week         oz/Week             Comments



                                         Alcohol Use   

 

                                                             



                                         No   







 



                           Sex Assigned at Birth     Date Recorded

 

 



                                         Not on file 







                                        Industry



                           Job Start Date            Occupation 

 

                                        Not on file



                           Not on file               Not on file 







                                        Travel End



                           Travel History            Travel Start 

 





                                         No recent travel history available.



documented as of this encounter



Miscellaneous Notes

* Telephone Encounter - Luisana Rob - 2019  3:22 PM CDT



Patient is already scheduled with [facility] . 



Electronically signed by Luisana Rob at 2019  3:22 PM CDT

* Telephone Encounter - Aleja Lei - 2019  5:07 PM CDT



Called and spoke to pt and notified her of rec to see Dr. Nguyen prior to rahul thomas the decision about needing an MRI. She v/u and will await a call from [facility]. Routing to scheduling. 



Electronically signed by Aleja Lei at 2019  5:08 PM CDT

* Telephone Encounter - Mariam Nguyen MD - 2019  3:22 PM CDT



Favor revisit with OV



Electronically signed by Mariam Nguyen MD at 2019  3:22 PM CDT

* Telephone Encounter - El Nelson RN - 2019  2:43 PM CDT



Pt's daughter called. She wants Pt to start seeing Dr Nguyen again. Pt had a pr
evious order of a CV MRI, then cancelled it. Pt's daughter wants to ask Dr Raj negron if we need to re-order that, and anything else, before her Mother should come
in for a Visit.



Electronically signed by El Nelson RN at 2019  2:46 PM CDT

documented in this encounter



Plan of Treatment





Not on filedocumented as of this encounter



Visit Diagnoses

Not on filedocumented in this encounter

## 2020-03-13 NOTE — XMS REPORT
Encounter Summary

                             Created on: 2020



Alvina Charles

External Reference #: [id number]

: 1935

Sex: Female



Demographics





                          Address                   [address] [place]  [address]

 

                          Home Phone                [phone]

 

                          Preferred Language        English

 

                          Marital Status            

 

                          Advent Affiliation     [id number]

 

                          Race                      White

 

                          Ethnic Group              Not  or 





Author





                          Author                    [facility]

 

                          Organization              [facility]

 

                          Address                   Unknown

 

                          Phone                     Unavailable







Support





                Name            Relationship    Address         Phone

 

                Krista Honeycutt   [employer]            Unknown         [phone]







Care Team Providers





                    Care Team Member Name Role                Phone

 

                    Payam Hutchison   PCP                 [phone]







Reason for Referral

* Cardiac Cath (Routine)



                          Referred By Contact       Referred To Contact



                 Status          Reason          Specialty       Diagnoses /  



                                         Procedures  

 

                                        



[facility]



[address]



[address] 



[place] [address]



Phone: [phone]



Fax: [phone]                       







                           Closed                    Procedures  



                                         Right Heart  



                                         Catheterization  











Encounter Details





                          Care Team                 Description



                     Date                Type                Department  

 

                                        



Kristen Fiore RN                     



                     2017          Abstract            [facility]  



                                         [facility]  



                                         [facility]  



                                         [address]  



                                         [address]   



                                         [place], [place] [id number] [phone]  







Social History





                                        Date



                 Tobacco Use     Types           Packs/Day       Years Used 

 

                                         



                                         Never Smoker    

 

    



                                         Smokeless Tobacco: Never   



                                         Used   







                    Drinks/Week         oz/Week             Comments



                                         Alcohol Use   

 

                                                             



                                         No   







 



                           Sex Assigned at Birth     Date Recorded

 

 



                                         Not on file 







                                        Industry



                           Job Start Date            Occupation 

 

                                        Not on file



                           Not on file               Not on file 







                                        Travel End



                           Travel History            Travel Start 

 





                                         No recent travel history available.



documented as of this encounter



Plan of Treatment





Not on filedocumented as of this encounter



Procedures





                                        Comments



                 Procedure Name  Priority        Date/Time       Associated Diag

nosis 

 

                                        



Results for this procedure are in the results section.



                     RIGHT HEART         Routine             2016  



                           CATHETERIZATION           9:18 PM CDT  

 

                                        



Results for this procedure are in the results section.



                     LIPID PANEL         Routine             2016  

 

                                        



Results for this procedure are in the results section.



                     CV US CAROTID OUTSIDE  Routine             2016  



                           RECORD                    9:28 PM CDT  

 

                                        



Results for this procedure are in the results section.



                     ECHO OUTSIDE RECORD  Routine             2015  



                                         9:36 PM CDT  



documented in this encounter



Results

* Right Heart Catheterization (2016  9:18 PM CDT)



 



                           Impressions               Performed At

 

 



                                         RA: 7. RV: 41/9. PA: 38/11/20. Wedge: 1

5 with V-waves to 25. LV: 143/13, 



                                         Ascending aortic pressure: 145/61. CO/C

I (Sanna): 3.6/2.1. LVEF 55%. ([facility]) 





* Lipid Panel (2016)



    



              Component    Value        Ref Range    Performed At  Pathologist



                                         Signature

 

    



                     Triglycerides       80                  40 - 160 mg/dL  

 

    



                           HDL Cholesterol           35 - 70 mg/dL  

 

    



                     LDL Cholesterol     102                 mg/dL  

 

    



                                         Cholesterol/HDL    



                                         Ratio    

 

    



                                         LDL    



                                         INTERPRETATION    

 

    



                                         Non-HDL    



                                         Cholesterol    

 

    



                           Cholesterol               158   











                                         Specimen

 





                                         Blood





* CV US Carotid Outside Record (2016  9:28 PM CDT)



 



                           Impressions               Performed At

 

 



                                         Mild atherosclerotic disease involving 

both carotid systems. No evidence for 



                                         hemodynamically significant ([facility]

i) 





* Echo Outside Record (2015  9:36 PM CDT)



    



              Component    Value        Ref Range    Performed At  Pathologist



                                         Signature

 

    



                                         Ejection    



                                         Fraction    







 



                           Impressions               Performed At

 

 



                                         Normal LVEF. Grade III diastolic dysfun

ction. 1+MR. Mild dilatation of RV and 

RA 



                                         with 1/2+ TR. Moderate pulmonair hypert

ension -40mmHg ([facility] ) 





documented in this encounter



Visit Diagnoses









                                         Diagnosis

 





                                         Multiple sclerosis (HCC)



                                         Multiple sclerosis



documented in this encounter

## 2020-03-13 NOTE — XMS REPORT
Encounter Summary

                             Created on: 2020



Alvina Charles

External Reference #: [id number]

: 1935

Sex: Female



Demographics





                          Address                   [address] [place]  [address]

 

                          Home Phone                [phone]

 

                          Preferred Language        English

 

                          Marital Status            

 

                          Alevism Affiliation     [id number]

 

                          Race                      White

 

                          Ethnic Group              Not  or 





Author





                          Author                    [facility] Organization [facility]

 

                          Address                   Unknown

 

                          Phone                     Unavailable







Support





                Name            Relationship    Address         Phone

 

                Krista Honeycutt   [employer]            Unknown         [phone]







Care Team Providers





                    Care Team Member Name Role                Phone

 

                    Payam Hutchison   PCP                 [phone]







Encounter Details





                          Care Team                 Description



                     Date                Type                Department  

 

                                        



Kelly Winters RN                       



                     2016          Abstract            [facility]  



                                         [facility]  



                                         [facility]  



                                         [address]  



                                         [address]  



                                         [place], [place] [address] [phone]  







Social History





                                        Date



                 Tobacco Use     Types           Packs/Day       Years Used 

 

                                         



                                         Never Smoker    

 

    



                                         Smokeless Tobacco: Never   



                                         Used   







                    Drinks/Week         oz/Week             Comments



                                         Alcohol Use   

 

                                                             



                                         No   







 



                           Sex Assigned at Birth     Date Recorded

 

 



                                         Not on file 







                                        Industry



                           Job Start Date            Occupation 

 

                                        Not on file



                           Not on file               Not on file 







                                        Travel End



                           Travel History            Travel Start 

 





                                         No recent travel history available.



documented as of this encounter



Plan of Treatment





Not on filedocumented as of this encounter



Procedures





                                        Comments



                 Procedure Name  Priority        Date/Time       Associated Diag

nosis 

 

                                        



Results for this procedure are in the results section.



                     VITAMIN D, 25-HYDROXY  Routine             2015  

 

                                        



Results for this procedure are in the results section.



                     THYROID STIMULATING  Routine             2015  



                                         HORMONE    

 

                                        



Results for this procedure are in the results section.



                     POTASSIUM           Routine             2015  

 

                                        



Results for this procedure are in the results section.



                     CREATININE          Routine             2015  

 

                                        



Results for this procedure are in the results section.



                     ASPARTATE           Routine             2015  



                                         AMINOTRANSFERASE    

 

                                        



Results for this procedure are in the results section.



                     ALANINE AMINOTRANSFERASE  Routine             2015  



documented in this encounter



Results

* Creatinine (2015)



    



              Component    Value        Ref Range    Performed At  Pathologist



                                         Signature

 

    



                 Creatinine      0.6             0.5 - 1.1 mg/dL  [facility] 

 

    



                     Creatinine          0.5 - 1.1 mg/dL     [facility] 

 

    



                           eGFR If                   [facility] 



                                         NonAfricn Am    

 

    



                           eGFR If Africn            [facility] 



                                         Am    











                                         Specimen

 





                                         Blood







   



                 Performing Organization  Address         City/State/Zipcode  Ph

one Number

 

   



                     [facility]                [address], 

[address] 

 

   



                     [facility]                [address], [place] [address]

[address], [place] 





* Potassium (2015)



    



              Component    Value        Ref Range    Performed At  Pathologist



                                         Signature

 

    



                 Potassium       4.7             3.4 - 5.3 mmol/L  [facility] 











                                         Specimen

 





                                         Blood







   



                 Performing Organization  Address         City/[facility]

one Number

 

   



                     [facility]                [address], 

[address] 

 

   



                     [facility]                [address], [place] [address]

[address], [place] 





* Vitamin D, 25-Hydroxy (2015)



    



              Component    Value        Ref Range    Performed At  Pathologist



                                         Signature

 

    



                     Vitamin D           58.24               [facility] 



                                         25-Hydroxy    











                                         Specimen

 





                                         Blood







   



                 Performing Organization  Address         City/State/[facility]

one Number

 

   



                     [facility]                [address], [place] [address]

[address] 

 

   



                     [facility]                [address], [place] [address]

[address], [place] 





* Thyroid Stimulating Hormone (2015)



    



              Component    Value        Ref Range    Performed At  Pathologist



                                         Signature

 

    



                 Thyroid         0.63            0.41 - 5.90 uIU/mL  [facility] 



                                         Stimulating    



                                         Hormone    











                                         Specimen

 





                                         Blood







   



                 Performing Organization  Address         City/State/[facility]

one Number

 

   



                     [facility]                [address], [place] [address]

[address] 

 

   



                     [facility]                [address], [place] [address]

[address], [place] 





* Aspartate Aminotransferase (2015)



    



              Component    Value        Ref Range    Performed At  Pathologist



                                         Signature

 

    



                 Aspartate       24              13 - 35 U/L     [facility] 



                                         Aminotransferas    



                                         e    











                                         Specimen

 





                                         Blood







   



                 Performing Organization  Address         City/State/[facility]

one Number

 

   



                     [facility]                [address], [place] [address]

[address] 

 

   



                     [facility]                [address], [place] [address]

[address], [place] 





* Alanine Aminotransferase (2015)



    



              Component    Value        Ref Range    Performed At  Pathologist



                                         Signature

 

    



                 Alanine         18              7 - 35 U/L      [facility] 



                                         Aminotransferas    



                                         e    











                                         Specimen

 





                                         Blood







   



                 Performing Organization  Address         City/State/[facility]

one Number

 

   



                     [facility]                [address], [place] [address]

[address] 

 

   



                     [facility]                [address] [place] [address]

[address], [place] 





documented in this encounter



Visit Diagnoses

Not on filedocumented in this encounter

## 2020-04-15 NOTE — XMS REPORT
Encounter Summary

                             Created on: 2020



Alvina Charles

External Reference #: [id number]

: 1935

Sex: Female



Demographics





                          Address                   [address] [place]  [address]

 

                          Home Phone                [phone]

 

                          Preferred Language        English

 

                          Marital Status            

 

                          Druze Affiliation     [id number]

 

                          Race                      White

 

                          Ethnic Group              Not  or 





Author





                          Author                    [facility]

 

                          Organization              [facility]

 

                          Address                   Unknown

 

                          Phone                     Unavailable







Support





                Name            Relationship    Address         Phone

 

                Krista Honeycutt   [employer]            Unknown         [phone]







Care Team Providers





                    Care Team Member Name Role                Phone

 

                    Payam Hutchison   PCP                 [phone]







Encounter Details





                          Care Team                 Description



                     Date                Type                Department  

 

                                        



Mariam Nguyen MD



[address]



[address]



[place] [address]



[phone] (Fax)                       



                     2019          Transcribe          [facility]

al  



                           Orders                    [address]  



                                         [address]  



                                         [place] [address]  



                                         [phone]  







Social History





                                        Date



                 Tobacco Use     Types           Packs/Day       Years Used 

 

                                         



                                         Never Smoker    

 

    



                                         Smokeless Tobacco: Never   



                                         Used   







                    Drinks/Week         oz/Week             Comments



                                         Alcohol Use   

 

                                                             



                                         No   







 



                           Sex Assigned at Birth     Date Recorded

 

 



                                         Not on file 







                                        Industry



                           Job Start Date            Occupation 

 

                                        Not on file



                           Not on file               Not on file 







                                        Travel End



                           Travel History            Travel Start 

 





                                         No recent travel history available.



documented as of this encounter



Plan of Treatment





Not on filedocumented as of this encounter



Visit Diagnoses

Not on filedocumented in this encounter Form completed by Physician's Office.

## 2020-08-04 ENCOUNTER — HOSPITAL ENCOUNTER (OUTPATIENT)
Dept: HOSPITAL 75 - CARD | Age: 85
End: 2020-08-04
Attending: INTERNAL MEDICINE
Payer: MEDICARE

## 2020-08-04 DIAGNOSIS — I08.3: Primary | ICD-10-CM

## 2020-08-04 DIAGNOSIS — I44.7: ICD-10-CM

## 2020-08-04 PROCEDURE — 93306 TTE W/DOPPLER COMPLETE: CPT

## 2021-05-06 ENCOUNTER — HOSPITAL ENCOUNTER (OUTPATIENT)
Dept: HOSPITAL 75 - RAD | Age: 86
End: 2021-05-06
Attending: NURSE PRACTITIONER
Payer: MEDICARE

## 2021-05-06 DIAGNOSIS — Z90.710: ICD-10-CM

## 2021-05-06 DIAGNOSIS — R10.2: ICD-10-CM

## 2021-05-06 DIAGNOSIS — R19.09: Primary | ICD-10-CM

## 2021-05-06 PROCEDURE — 76830 TRANSVAGINAL US NON-OB: CPT

## 2021-05-06 PROCEDURE — 76856 US EXAM PELVIC COMPLETE: CPT

## 2021-05-06 NOTE — DIAGNOSTIC IMAGING REPORT
PROCEDURE: 

Pelvic comp/transvaginal sonogram.



TECHNIQUE: 

Complete transabdominal and transvaginal pelvic ultrasound was

performed. In addition, limited pelvic Doppler was performed.



INDICATION: Partial hysterectomy.



FINDINGS: The uterus is surgically absent. Vaginal cuff is

unremarkable. Right ovary was not visualized. There is shadowing

from the right adnexa, etiology indeterminate. Left ovary

measures 1.1 x 0.5 x 0.5 cm. No free fluid or mass is seen.



IMPRESSION: Partial hysterectomy. Left ovary is unremarkable.

There is a large amount of shadowing arising from the right

adnexa which could be secondary to a calcified mass. A CT may be

useful for further evaluation.



Dictated by: 



  Dictated on workstation # [id number]

## 2022-11-04 ENCOUNTER — HOSPITAL ENCOUNTER (OUTPATIENT)
Dept: HOSPITAL 75 - CARD | Age: 87
End: 2022-11-04
Attending: NURSE PRACTITIONER
Payer: MEDICARE

## 2022-11-04 DIAGNOSIS — I08.1: Primary | ICD-10-CM

## 2022-11-04 DIAGNOSIS — I44.7: ICD-10-CM

## 2022-11-04 DIAGNOSIS — I50.9: ICD-10-CM

## 2022-11-04 PROCEDURE — 93306 TTE W/DOPPLER COMPLETE: CPT
